# Patient Record
Sex: MALE | Race: WHITE | NOT HISPANIC OR LATINO | Employment: OTHER | URBAN - METROPOLITAN AREA
[De-identification: names, ages, dates, MRNs, and addresses within clinical notes are randomized per-mention and may not be internally consistent; named-entity substitution may affect disease eponyms.]

---

## 2017-01-12 ENCOUNTER — HOSPITAL ENCOUNTER (OUTPATIENT)
Dept: GENERAL RADIOLOGY | Facility: HOSPITAL | Age: 62
Discharge: HOME | End: 2017-01-12
Admitting: INTERNAL MEDICINE

## 2017-01-12 ENCOUNTER — OFFICE VISIT (OUTPATIENT)
Dept: FAMILY MEDICINE CLINIC | Facility: CLINIC | Age: 62
End: 2017-01-12

## 2017-01-12 VITALS
HEIGHT: 67 IN | TEMPERATURE: 98.4 F | RESPIRATION RATE: 16 BRPM | DIASTOLIC BLOOD PRESSURE: 80 MMHG | BODY MASS INDEX: 26.95 KG/M2 | SYSTOLIC BLOOD PRESSURE: 114 MMHG | OXYGEN SATURATION: 97 % | WEIGHT: 171.7 LBS | HEART RATE: 54 BPM

## 2017-01-12 DIAGNOSIS — K21.9 GASTROESOPHAGEAL REFLUX DISEASE, ESOPHAGITIS PRESENCE NOT SPECIFIED: ICD-10-CM

## 2017-01-12 DIAGNOSIS — K59.00 CONSTIPATION, UNSPECIFIED CONSTIPATION TYPE: Primary | ICD-10-CM

## 2017-01-12 PROCEDURE — 99213 OFFICE O/P EST LOW 20 MIN: CPT | Performed by: INTERNAL MEDICINE

## 2017-01-12 PROCEDURE — 74020 HC XR ABDOMEN FLAT & UPRIGHT: CPT

## 2017-01-12 NOTE — MR AVS SNAPSHOT
Chino DEJESUS Mayo   1/12/2017 10:45 AM   Office Visit    Dept Phone:  [phone]   Encounter #:  [id number]    Provider:  Yuri Salcedo MD   Department:  [facility] PRIMARY [facility]ARE                Your Full Care Plan              Today's Medication Changes          These changes are accurate as of: 1/12/17 11:20 AM.  If you have any questions, ask your nurse or doctor.               Medication(s)that have changed:     esomeprazole 40 MG capsule   Commonly known as:  nexIUM   TAKE 1 CAPSULE DAILY.      PLEASE MAKE AN APPOINTMENT WITH YOUR DOCTOR.   What changed:  Another medication with the same name was removed. Continue taking this medication, and follow the directions you see here.   Changed by:  Yuri Salcedo MD                  Your Updated Medication List          This list is accurate as of: 1/12/17 11:20 AM.  Always use your most recent med list.                aspirin 325 MG tablet       atorvastatin 80 MG tablet   Commonly known as:  LIPITOR   Take 1 tablet by mouth Daily.       esomeprazole 40 MG capsule   Commonly known as:  nexIUM   TAKE 1 CAPSULE DAILY.      PLEASE MAKE AN APPOINTMENT WITH YOUR DOCTOR.       lisinopril 2.5 MG tablet   Commonly known as:  PRINIVIL,ZESTRIL   TAKE 1 TABLET DAILY       metoprolol succinate XL 25 MG 24 hr tablet   Commonly known as:  TOPROL-XL   TAKE 1 TABLET DAILY               We Performed the Following     XR Abdomen Flat & Upright       You Were Diagnosed With        Codes Comments    Constipation, unspecified constipation type    -  Primary ICD-10-CM: K59.00  ICD-9-CM: 564.00       Instructions     None    Patient Instructions History      Upcoming Appointments     Visit Type Date Time Department    OFFICE VISIT 1/12/2017 10:45 AM [facility]    FOLLOW UP 2/15/2017 10:20 AM [facility] Signup     [facility] [employer] allows you to send messages to your doctor, view your test results, renew your  "prescriptions, schedule appointments, and more. To sign up, go to [employer] and click on the Sign Up Now link in the New User? box. Enter your [employer] Activation Code exactly as it appears below along with the last four digits of your Social Security Number and your Date of Birth () to complete the sign-up process. If you do not sign up before the expiration date, you must request a new code.    [employer] Activation Code: [email or web address]  Expires: 2017 11:20 AM    If you have questions, you can email [email or web address] or call [phone] to talk to our [employer] staff. Remember, [employer] is NOT to be used for urgent needs. For medical emergencies, dial 911.               Other Info from Your Visit           Your Appointments     Feb 15, 2017 10:20 AM EST   Follow Up with Kisha Berman MD   [facility] CARDIOLOGY (--)    [address] [address] [phone]           Arrive 15 minutes prior to appointment.              Allergies     Other        Reason for Visit     Heartburn gerd      Vital Signs     Blood Pressure Pulse Temperature Respirations Height Weight    114/80 54 98.4 °F (36.9 °C) (Oral) 16 67\" (170.2 cm) 171 lb 11.2 oz (77.9 kg)    Oxygen Saturation Body Mass Index Smoking Status             97% 26.89 kg/m2 Never Smoker         Problems and Diagnoses Noted     Constipation        "

## 2017-01-12 NOTE — PROGRESS NOTES
"Subjective   Patient ID: Chino Huber is a 61 y.o. male presents with   Chief Complaint   Patient presents with   • Heartburn     gerd       HPI - this patient had an episode of what he thinks was reflux around the new year.  He doubled up on his Nexium and now the symptoms have resolved.  However he has some right upper quadrant pain radiating to his back that is bothering him as well.  That has improved since his constipation has resolved.  He is already set up for a colonoscopy.    Assessment plan    GERD and constipation-recommend going back to usual dose of Nexium now that his reflux is better.  If it recurs then we should send him to GI for further evaluation.    Constipation-he's set up for a colonoscopy within the next month.  I'll go ahead and get him x-ray of the abdomen.  For now he declines an ultrasound of the abdomen.    Allergies   Allergen Reactions   • Other        The following portions of the patient's history were reviewed and updated as appropriate: allergies, current medications, past family history, past medical history, past social history, past surgical history and problem list.      Review of Systems   Constitutional: Negative for fatigue and fever.   Cardiovascular: Negative.    Gastrointestinal: Positive for abdominal distention and constipation. Negative for nausea and vomiting.        Reflux symptoms up around the throat, now resolved.  No exertional symptoms.    Right upper quadrant abdominal pain radiating to the back which is now improved.   Genitourinary: Negative.    Musculoskeletal: Positive for back pain.       Objective     Vitals:    01/12/17 1045   BP: 114/80   Pulse: 54   Resp: 16   Temp: 98.4 °F (36.9 °C)   TempSrc: Oral   SpO2: 97%   Weight: 171 lb 11.2 oz (77.9 kg)   Height: 67\" (170.2 cm)         Physical Exam   Constitutional: He is oriented to person, place, and time. He appears well-developed and well-nourished.   Cardiovascular: Normal rate, regular rhythm and normal " heart sounds.    Pulmonary/Chest: Effort normal and breath sounds normal.   Abdominal: Soft. He exhibits no distension and no mass. There is no tenderness. No hernia.   Neurological: He is alert and oriented to person, place, and time.   Psychiatric: He has a normal mood and affect. His behavior is normal.   Nursing note and vitals reviewed.        Chino was seen today for heartburn.    Diagnoses and all orders for this visit:    Constipation, unspecified constipation type  -     XR Abdomen Flat & Upright    Gastroesophageal reflux disease, esophagitis presence not specified        Call or return to clinic prn if these symptoms worsen or fail to improve as anticipated.

## 2017-02-03 ENCOUNTER — ANESTHESIA (OUTPATIENT)
Dept: GASTROENTEROLOGY | Facility: HOSPITAL | Age: 62
End: 2017-02-03

## 2017-02-03 ENCOUNTER — ANESTHESIA EVENT (OUTPATIENT)
Dept: GASTROENTEROLOGY | Facility: HOSPITAL | Age: 62
End: 2017-02-03

## 2017-02-03 PROCEDURE — 25010000002 PROPOFOL 10 MG/ML EMULSION: Performed by: ANESTHESIOLOGY

## 2017-02-03 RX ORDER — PROPOFOL 10 MG/ML
VIAL (ML) INTRAVENOUS AS NEEDED
Status: DISCONTINUED | OUTPATIENT
Start: 2017-02-03 | End: 2017-02-03 | Stop reason: SURG

## 2017-02-03 RX ORDER — LIDOCAINE HYDROCHLORIDE 20 MG/ML
INJECTION, SOLUTION INFILTRATION; PERINEURAL AS NEEDED
Status: DISCONTINUED | OUTPATIENT
Start: 2017-02-03 | End: 2017-02-03 | Stop reason: SURG

## 2017-02-03 RX ADMIN — SODIUM CHLORIDE, POTASSIUM CHLORIDE, SODIUM LACTATE AND CALCIUM CHLORIDE: 600; 310; 30; 20 INJECTION, SOLUTION INTRAVENOUS at 14:35

## 2017-02-03 RX ADMIN — LIDOCAINE HYDROCHLORIDE 160 MG: 20 INJECTION, SOLUTION INFILTRATION; PERINEURAL at 14:45

## 2017-02-03 RX ADMIN — PROPOFOL 360 MG: 10 INJECTION, EMULSION INTRAVENOUS at 14:45

## 2017-02-03 NOTE — ANESTHESIA PREPROCEDURE EVALUATION
Anesthesia Evaluation     Patient summary reviewed and Nursing notes reviewed    Airway   Mallampati: II  TM distance: >3 FB  Neck ROM: full  no difficulty expected  Dental - normal exam     Pulmonary - negative pulmonary ROS and normal exam   Cardiovascular - normal exam  (+) hypertension, valvular problems/murmurs AS, CAD, PVD    Neuro/Psych- negative ROS  GI/Hepatic/Renal/Endo    (+)  GERD,     Musculoskeletal (-) negative ROS    Abdominal  - normal exam   Substance History - negative use     OB/GYN          Other - negative ROS                            Anesthesia Plan    ASA 3     MAC     intravenous induction   Anesthetic plan and risks discussed with patient.

## 2017-02-03 NOTE — ANESTHESIA POSTPROCEDURE EVALUATION
Patient: Chino Huber    Procedure Summary     Date Anesthesia Start Anesthesia Stop Room / Location    02/03/17 1439 1528  [facility] /  [facility]       Procedure Diagnosis Surgeon Provider    COLONOSCOPY TO CECUM WITH HOT SNARE POLYPECTOMY WITH NORMAL SALINE INJECTION AND  TWO RESOLUTION CLIPS (N/A ) Encounter for screening for malignant neoplasm of colon; Family history of GI malignancy  (Encounter for screening for malignant neoplasm of colon [Z12.11]; Family history of GI malignancy [Z80.0]) MD Michael Mckenzie MD          Anesthesia Type: MAC  Last vitals  BP      Temp      Pulse     Resp      SpO2        Post Anesthesia Care and Evaluation    Patient location during evaluation: PACU  Patient participation: complete - patient participated  Level of consciousness: awake and alert  Pain management: adequate  Airway patency: patent  Anesthetic complications: No anesthetic complications    Cardiovascular status: acceptable  Respiratory status: acceptable  Hydration status: acceptable

## 2017-02-08 ENCOUNTER — TELEPHONE (OUTPATIENT)
Dept: GASTROENTEROLOGY | Facility: CLINIC | Age: 62
End: 2017-02-08

## 2017-02-08 NOTE — TELEPHONE ENCOUNTER
Called pt and advised per Dr Rose that the colon polyps that were removed not cancerous but were precancerous and he recommends a repeat c/s in 3 yrs and call sooner if needed.  Pt verb understanding and wanted to make a f/u appt for issues he is having with gerd.  Pt made f/u for 02/21 at 11am with Dr Rose.        Message sent to Filomena to place c/s in recall for 02/04/2020.

## 2017-02-08 NOTE — TELEPHONE ENCOUNTER
----- Message from Fam LILLY MD sent at 2/6/2017  5:44 PM EST -----  Regarding: Biopsy results  Okay to call results, recommend follow-up colonoscopy in 3 years, patient can follow up sooner as needed  ----- Message -----     From: Lab, Background User     Sent: 2/6/2017   1:21 PM       To: Fam LILLY MD

## 2017-02-15 ENCOUNTER — OFFICE VISIT (OUTPATIENT)
Dept: GASTROENTEROLOGY | Facility: CLINIC | Age: 62
End: 2017-02-15

## 2017-02-15 VITALS
HEIGHT: 67 IN | BODY MASS INDEX: 26.78 KG/M2 | WEIGHT: 170.6 LBS | SYSTOLIC BLOOD PRESSURE: 132 MMHG | DIASTOLIC BLOOD PRESSURE: 78 MMHG

## 2017-02-15 DIAGNOSIS — R10.31 RIGHT LOWER QUADRANT ABDOMINAL PAIN: Primary | ICD-10-CM

## 2017-02-15 DIAGNOSIS — R12 HEARTBURN: ICD-10-CM

## 2017-02-15 DIAGNOSIS — K21.9 GASTROESOPHAGEAL REFLUX DISEASE, ESOPHAGITIS PRESENCE NOT SPECIFIED: ICD-10-CM

## 2017-02-15 LAB
ALBUMIN SERPL-MCNC: 4.4 G/DL (ref 3.5–5.2)
ALBUMIN/GLOB SERPL: 2 G/DL
ALP SERPL-CCNC: 133 U/L (ref 39–117)
ALT SERPL-CCNC: 55 U/L (ref 1–41)
AST SERPL-CCNC: 25 U/L (ref 1–40)
BASOPHILS # BLD AUTO: 0.03 10*3/MM3 (ref 0–0.2)
BASOPHILS NFR BLD AUTO: 0.4 % (ref 0–1.5)
BILIRUB SERPL-MCNC: 0.3 MG/DL (ref 0.1–1.2)
BUN SERPL-MCNC: 13 MG/DL (ref 8–23)
BUN/CREAT SERPL: 12 (ref 7–25)
CALCIUM SERPL-MCNC: 10 MG/DL (ref 8.6–10.5)
CHLORIDE SERPL-SCNC: 102 MMOL/L (ref 98–107)
CO2 SERPL-SCNC: 27.1 MMOL/L (ref 22–29)
CREAT SERPL-MCNC: 1.08 MG/DL (ref 0.76–1.27)
EOSINOPHIL # BLD AUTO: 0.11 10*3/MM3 (ref 0–0.7)
EOSINOPHIL NFR BLD AUTO: 1.5 % (ref 0.3–6.2)
ERYTHROCYTE [DISTWIDTH] IN BLOOD BY AUTOMATED COUNT: 13.1 % (ref 11.5–14.5)
GLOBULIN SER CALC-MCNC: 2.2 GM/DL
GLUCOSE SERPL-MCNC: 102 MG/DL (ref 65–99)
HCT VFR BLD AUTO: 49.1 % (ref 40.4–52.2)
HGB BLD-MCNC: 16.2 G/DL (ref 13.7–17.6)
IMM GRANULOCYTES # BLD: 0.02 10*3/MM3 (ref 0–0.03)
IMM GRANULOCYTES NFR BLD: 0.3 % (ref 0–0.5)
LYMPHOCYTES # BLD AUTO: 2.31 10*3/MM3 (ref 0.9–4.8)
LYMPHOCYTES NFR BLD AUTO: 31.3 % (ref 19.6–45.3)
MCH RBC QN AUTO: 29.9 PG (ref 27–32.7)
MCHC RBC AUTO-ENTMCNC: 33 G/DL (ref 32.6–36.4)
MCV RBC AUTO: 90.6 FL (ref 79.8–96.2)
MONOCYTES # BLD AUTO: 0.63 10*3/MM3 (ref 0.2–1.2)
MONOCYTES NFR BLD AUTO: 8.5 % (ref 5–12)
NEUTROPHILS # BLD AUTO: 4.29 10*3/MM3 (ref 1.9–8.1)
NEUTROPHILS NFR BLD AUTO: 58 % (ref 42.7–76)
PLATELET # BLD AUTO: 248 10*3/MM3 (ref 140–500)
POTASSIUM SERPL-SCNC: 5.1 MMOL/L (ref 3.5–5.2)
PROT SERPL-MCNC: 6.6 G/DL (ref 6–8.5)
RBC # BLD AUTO: 5.42 10*6/MM3 (ref 4.6–6)
SODIUM SERPL-SCNC: 143 MMOL/L (ref 136–145)
WBC # BLD AUTO: 7.39 10*3/MM3 (ref 4.5–10.7)

## 2017-02-15 PROCEDURE — 99214 OFFICE O/P EST MOD 30 MIN: CPT | Performed by: NURSE PRACTITIONER

## 2017-02-15 RX ORDER — SODIUM CHLORIDE 0.9 % (FLUSH) 0.9 %
1-10 SYRINGE (ML) INJECTION AS NEEDED
Status: CANCELLED | OUTPATIENT
Start: 2017-02-15

## 2017-02-15 NOTE — PROGRESS NOTES
"Chief Complaint   Patient presents with   • Abdominal Pain   • Heartburn     Subjective     HPI  Chino Huber is a 61 y.o. male who presents for a follow-up regarding GERD and acute right lower quadrant abdominal pain.  The patient has never had an appointment in the office but was recently colonoscopy with Dr. Rose on February 3, 2017 for routine surveillance screening.  He will need a repeat colonoscopy in 3 years due to findings of diverticulosis and 3 TA polyps.  He has a family history of colon cancer in a first-degree relative.    GERD-he is prescribed Nexium 40 mg twice a day for his heartburn previously treated by his PCP Dr. Salcedo.  He has had a recent severe \"attack\" of heartburn that is improved since starting the twice a day dosing.  He has never had upper endoscopic evaluation.  He denies dysphagia, odynophagia, hematemesis, early satiety, anorexia, weight loss, melena, bright red blood per rectum.  He denies NSAID use, alcohol use, or tobacco use. Pt states he follows GERD diet guidelines    Abdominal pain- for the past 1.5 months he has complained of right lower quadrant abdominal pain radiates to his flank.  He was seen by Dr. Salcedo in January who ordered a abdominal x-ray which was unremarkable.  A colonoscopy with Dr. Rose was then arranged.  He continues to have a mild dull aching pain with no specific triggering or alleviating factors. He does state that it is better when he is up and moving around and worse in certain positions like bending over or sitting.  Cannot recall a specific injury that could've caused the pain  He denies urinary symptoms, fevers, chills, change in bowel habits.    Past Medical History   Diagnosis Date   • Acute sinusitis    • Aortic root dilatation    • Aortic stenosis    • Arteriosclerotic coronary artery disease    • CAD (coronary artery disease)    • Chest pain    • Cough    • Dilated aortic root    • Folliculitis    • GERD (gastroesophageal reflux " disease)    • H/O echocardiogram 04/29/2014   • H/O exercise stress test 01/23/2014     Treadmill   • Health care maintenance    • Heartburn    • History of EKG 10/28/2015   • Hyperlipidemia    • Hypertension    • Nonspecific abnormal findings on radiological and examination of intrathoracic organs    • Reflux esophagitis    • Sore throat    • Throat pain      Past Surgical History   Procedure Laterality Date   • Colonoscopy     • Rhinoplasty     • Prostate surgery     • Coronary angioplasty with stent placement     • Colonoscopy     • Prostate surgery     • Rhinoplasty     • Colonoscopy N/A 2/3/2017     Procedure: COLONOSCOPY TO CECUM WITH HOT SNARE POLYPECTOMY WITH NORMAL SALINE INJECTION AND  TWO RESOLUTION CLIPS;  Surgeon: Fam LILLY MD;  Location: [facility] ENDOSCOPY;  Service:          Social History     Social History   • Marital status:      Spouse name: N/A   • Number of children: N/A   • Years of education: N/A     Social History Main Topics   • Smoking status: Never Smoker   • Smokeless tobacco: Never Used      Comment: caffeine use   • Alcohol use No   • Drug use: No   • Sexual activity: Defer     Other Topics Concern   • None     Social History Narrative         Current Outpatient Prescriptions:   •  aspirin 325 MG tablet, Take 325 mg by mouth daily., Disp: , Rfl:   •  atorvastatin (LIPITOR) 80 MG tablet, Take 1 tablet by mouth Daily., Disp: 90 tablet, Rfl: 3  •  esomeprazole (NexIUM) 40 MG capsule, TAKE 1 CAPSULE DAILY.      PLEASE MAKE AN APPOINTMENT WITH YOUR DOCTOR., Disp: 90 capsule, Rfl: 0  •  lisinopril (PRINIVIL,ZESTRIL) 2.5 MG tablet, TAKE 1 TABLET DAILY, Disp: 90 tablet, Rfl: 1  •  metoprolol succinate XL (TOPROL-XL) 25 MG 24 hr tablet, TAKE 1 TABLET DAILY, Disp: 90 tablet, Rfl: 1    Review of Systems   Constitutional: Negative for appetite change and fatigue.   HENT: Negative for trouble swallowing.    Respiratory: Negative for choking.    Cardiovascular: Negative for chest  pain.   Gastrointestinal: Positive for abdominal pain. Negative for abdominal distention, blood in stool, constipation, diarrhea, nausea and vomiting.        Heartburn   Musculoskeletal: Negative for back pain.   Skin: Negative for pallor.   Neurological: Negative for dizziness.       Objective   Vitals:    02/15/17 0849   BP: 132/78       Physical Exam   Constitutional: He is oriented to person, place, and time. He appears well-developed and well-nourished.   HENT:   Head: Normocephalic and atraumatic.   Eyes: Conjunctivae and EOM are normal.   Neck: Normal range of motion. No tracheal deviation present.   Cardiovascular: Normal rate and regular rhythm.    Pulmonary/Chest: Effort normal and breath sounds normal. No respiratory distress.   Abdominal: Soft. Normal appearance and bowel sounds are normal. He exhibits no distension and no mass. There is tenderness in the right lower quadrant. There is no rigidity, no rebound, no guarding, no tenderness at McBurney's point and negative Quintana's sign.   No pain with palpation of flanks.    Musculoskeletal: Normal range of motion.   Neurological: He is alert and oriented to person, place, and time.   Skin: Skin is warm and dry.   Psychiatric: He has a normal mood and affect. Judgment normal.   Nursing note and vitals reviewed.      Assessment/Plan   Chino was seen today for abdominal pain and heartburn.    Diagnoses and all orders for this visit:    Right lower quadrant abdominal pain  Comments:  CT abdomen and pelvis  Orders:  -     CBC & Differential  -     Comprehensive Metabolic Panel  -     CT Abdomen Pelvis With & Without Contrast; Future    Gastroesophageal reflux disease, esophagitis presence not specified  Comments:  Continue PPi BID. Proceed with EGD for recurrent heartburn  Orders:  -     CBC & Differential  -     Case Request; Standing  -     sodium chloride 0.9 % flush 1-10 mL; Infuse 1-10 mL into a venous catheter As Needed for line care.  -     Case  Request    Heartburn  Comments:  nexium 40mg BID  Orders:  -     Case Request; Standing  -     sodium chloride 0.9 % flush 1-10 mL; Infuse 1-10 mL into a venous catheter As Needed for line care.  -     Case Request    Other orders  -     Implement Anesthesia orders day of procedure.; Standing  -     Obtain informed consent; Standing  -     Insert Peripheral IV; Standing  -     Saline Lock & Maintain IV Access; Standing    Follow up in 3 months. EGD with Dr Rose  For any additional questions, concerns or changes to your condition after today's office visit please contact the office at [phone].

## 2017-02-16 ENCOUNTER — TELEPHONE (OUTPATIENT)
Dept: GASTROENTEROLOGY | Facility: CLINIC | Age: 62
End: 2017-02-16

## 2017-02-16 DIAGNOSIS — R79.89 ELEVATED LIVER FUNCTION TESTS: ICD-10-CM

## 2017-02-16 DIAGNOSIS — R10.31 RIGHT LOWER QUADRANT ABDOMINAL PAIN: Primary | ICD-10-CM

## 2017-02-16 NOTE — TELEPHONE ENCOUNTER
Patient's alk phos and ALT mildly elevated with right sided abdominal pain and GERD symptoms. Check RUQ ultrasound. We will proceed with CT a/p if that is normal

## 2017-02-20 ENCOUNTER — HOSPITAL ENCOUNTER (OUTPATIENT)
Dept: ULTRASOUND IMAGING | Facility: HOSPITAL | Age: 62
Discharge: HOME | End: 2017-02-20
Admitting: NURSE PRACTITIONER

## 2017-02-20 DIAGNOSIS — R10.31 RIGHT LOWER QUADRANT ABDOMINAL PAIN: ICD-10-CM

## 2017-02-20 DIAGNOSIS — R79.89 ELEVATED LIVER FUNCTION TESTS: ICD-10-CM

## 2017-02-20 PROCEDURE — 76700 US EXAM ABDOM COMPLETE: CPT

## 2017-02-21 ENCOUNTER — TELEPHONE (OUTPATIENT)
Dept: GASTROENTEROLOGY | Facility: CLINIC | Age: 62
End: 2017-02-21

## 2017-02-21 NOTE — TELEPHONE ENCOUNTER
Pt abdominal pain improved but still mild. RUQ US unremarkable. If pain returns consider CT a/p but he would like to wait on further imaging right now. He has endoscopy scheduled with Dr Rose next month

## 2017-02-22 ENCOUNTER — OFFICE VISIT (OUTPATIENT)
Dept: CARDIOLOGY | Facility: CLINIC | Age: 62
End: 2017-02-22

## 2017-02-22 ENCOUNTER — APPOINTMENT (OUTPATIENT)
Dept: CT IMAGING | Facility: HOSPITAL | Age: 62
End: 2017-02-22

## 2017-02-22 VITALS
HEART RATE: 54 BPM | SYSTOLIC BLOOD PRESSURE: 128 MMHG | BODY MASS INDEX: 26.53 KG/M2 | WEIGHT: 169 LBS | HEIGHT: 67 IN | DIASTOLIC BLOOD PRESSURE: 76 MMHG

## 2017-02-22 DIAGNOSIS — I77.810 AORTIC ROOT DILATATION (HCC): ICD-10-CM

## 2017-02-22 DIAGNOSIS — Q23.1 BICUSPID AORTIC VALVE: ICD-10-CM

## 2017-02-22 DIAGNOSIS — I25.10 CHRONIC CORONARY ARTERY DISEASE: ICD-10-CM

## 2017-02-22 DIAGNOSIS — I35.0 NONRHEUMATIC AORTIC VALVE STENOSIS: ICD-10-CM

## 2017-02-22 DIAGNOSIS — I10 HYPERTENSION, ESSENTIAL: Primary | ICD-10-CM

## 2017-02-22 PROCEDURE — 99213 OFFICE O/P EST LOW 20 MIN: CPT | Performed by: INTERNAL MEDICINE

## 2017-02-22 PROCEDURE — 93000 ELECTROCARDIOGRAM COMPLETE: CPT | Performed by: INTERNAL MEDICINE

## 2017-02-22 NOTE — PROGRESS NOTES
Date of Office Visit: 2017  Encounter Provider: Kisha Berman MD  Place of Service: [facility] CARDIOLOGY  Patient Name: Chino Huber  :1955    Chief complaint  Follow-up of coronary artery disease, bicuspid aortic valve and dilated aortic root    History of Present Illness  The patient is a pleasant, 61year-old gentleman with history of prostate cancer status   post resection, hypertension, and hyperlipidemia who in 2014 was noted to have coronary   artery disease when he presented with chest pain and abnormal stress test. A cardiac   catheterization revealed 90% stenosis of the circumflex artery and of the right coronary   artery. The left anterior descending and left main were normal. He underwent drug-coated   stent placement to both the circumflex and the right coronary artery.  He had a CT antrum of his chest in May 2015 that revealed stable thoracic aneurysm measuring 4.0 cm. He was seen in 2016 with recurrent chest pain.  Echocardiogram revealed an ejection fraction 67% with bicuspid aortic valve with mild aortic valve stenosis.  No significant regurgitation the aortic root was borderline dilated.  Subsequently had a stress perfusion study that was negative for ischemia he now presents a follow-up.    Since last visit he has been walking for 1-1/2 miles in 30 minutes every day.  He denies any chest pain, shortness of breath, palpitations, syncope.  He had a bad episode of reflux 2 weeks ago.  He is considering an EGD in the next several weeks.  He had a gallbladder ultrasound today after recent elevated liver function tests were noted.  This was performed after he had some right-sided mid back pain.    Past Medical History   Diagnosis Date   • Acute sinusitis    • Aortic root dilatation    • Aortic stenosis    • Arteriosclerotic coronary artery disease    • CAD (coronary artery disease)    • Chest pain    • Cough    • Dilated aortic root    • Folliculitis     • GERD (gastroesophageal reflux disease)    • Heartburn    • Hyperlipidemia    • Hypertension    • Nonspecific abnormal findings on radiological and examination of intrathoracic organs    • Reflux esophagitis    • Sore throat    • Throat pain      Past Surgical History   Procedure Laterality Date   • Colonoscopy     • Rhinoplasty     • Prostate surgery     • Coronary angioplasty with stent placement     • Colonoscopy     • Prostate surgery     • Rhinoplasty     • Colonoscopy N/A 2/3/2017     Procedure: COLONOSCOPY TO CECUM WITH HOT SNARE POLYPECTOMY WITH NORMAL SALINE INJECTION AND  TWO RESOLUTION CLIPS;  Surgeon: Fam LILLY MD;  Location: [facility] ENDOSCOPY;  Service:      Outpatient Medications Prior to Visit   Medication Sig Dispense Refill   • aspirin 325 MG tablet Take 325 mg by mouth daily.     • atorvastatin (LIPITOR) 80 MG tablet Take 1 tablet by mouth Daily. 90 tablet 3   • esomeprazole (NexIUM) 40 MG capsule TAKE 1 CAPSULE DAILY.      PLEASE MAKE AN APPOINTMENT WITH YOUR DOCTOR. 90 capsule 0   • lisinopril (PRINIVIL,ZESTRIL) 2.5 MG tablet TAKE 1 TABLET DAILY 90 tablet 1   • metoprolol succinate XL (TOPROL-XL) 25 MG 24 hr tablet TAKE 1 TABLET DAILY 90 tablet 1     No facility-administered medications prior to visit.      Allergies as of 02/22/2017 - Jm as Reviewed 02/22/2017   Allergen Reaction Noted   • Other  06/30/2016     Social History     Social History   • Marital status:      Spouse name: N/A   • Number of children: N/A   • Years of education: N/A     Occupational History   • Not on file.     Social History Main Topics   • Smoking status: Never Smoker   • Smokeless tobacco: Never Used      Comment: caffeine use   • Alcohol use No   • Drug use: No   • Sexual activity: Defer     Other Topics Concern   • Not on file     Social History Narrative     Family History   Problem Relation Age of Onset   • Heart disease Mother    • Hypertension Mother    • Heart disease Father    •  "Hypertension Father    • Other Father      CABG, heart valve replacement   • Coronary artery disease Father      Review of Systems   Constitution: Negative for fever, malaise/fatigue, weight gain and weight loss.   HENT: Negative for ear pain, hearing loss, nosebleeds and sore throat.    Eyes: Negative for double vision, pain, vision loss in left eye and vision loss in right eye.   Cardiovascular:        See history of present illness.   Respiratory: Negative for cough, shortness of breath, sleep disturbances due to breathing, snoring and wheezing.    Endocrine: Negative for cold intolerance, heat intolerance and polyuria.   Skin: Negative for itching, poor wound healing and rash.   Musculoskeletal: Negative for joint pain, joint swelling and myalgias.   Gastrointestinal: Positive for abdominal pain. Negative for diarrhea, hematochezia, nausea and vomiting.   Genitourinary: Negative for hematuria and hesitancy.   Neurological: Negative for numbness, paresthesias and seizures.   Psychiatric/Behavioral: Negative for depression. The patient is not nervous/anxious.      Objective:     Vitals:    02/22/17 1322   BP: 128/76   Pulse: 54   Weight: 169 lb (76.7 kg)   Height: 67\" (170.2 cm)     Body mass index is 26.47 kg/(m^2).    Physical Exam   Constitutional: He is oriented to person, place, and time. He appears well-developed and well-nourished.   HENT:   Head: Normocephalic.   Nose: Nose normal.   Mouth/Throat: Oropharynx is clear and moist.   Eyes: Conjunctivae and EOM are normal. Pupils are equal, round, and reactive to light. Right eye exhibits no discharge. No scleral icterus.   Neck: Normal range of motion. Neck supple. No JVD present. No thyromegaly present.   Cardiovascular: Normal rate, regular rhythm and intact distal pulses.  Exam reveals no gallop and no friction rub.    Murmur heard.   Harsh midsystolic murmur is present with a grade of 2/6  at the upper right sternal border radiating to the neck  Pulses:    "    Carotid pulses are 2+ on the right side, and 2+ on the left side.       Radial pulses are 2+ on the right side, and 2+ on the left side.        Femoral pulses are 2+ on the right side, and 2+ on the left side.       Popliteal pulses are 2+ on the right side, and 2+ on the left side.        Dorsalis pedis pulses are 2+ on the right side, and 2+ on the left side.        Posterior tibial pulses are 2+ on the right side, and 2+ on the left side.   Pulmonary/Chest: Effort normal and breath sounds normal. No respiratory distress. He has no wheezes. He has no rales.   Abdominal: Soft. Bowel sounds are normal. He exhibits no distension. There is no hepatosplenomegaly. There is no tenderness. There is no rebound.   Musculoskeletal: Normal range of motion. He exhibits no edema or tenderness.   Neurological: He is alert and oriented to person, place, and time.   Skin: Skin is warm and dry. No rash noted. No erythema.   Psychiatric: He has a normal mood and affect. His behavior is normal. Judgment and thought content normal.   Vitals reviewed.    Lab Review:     ECG 12 Lead  Date/Time: 2/22/2017 7:06 PM  Performed by: PARVEEN WILKINS  Authorized by: PARVEEN WILKINS   Comparison: compared with previous ECG   Similar to previous ECG  Rhythm: sinus bradycardia  Conduction comments: QTc = 395msec  Clinical impression: normal ECG          Assessment:       Diagnosis Plan   1. Hypertension, essential     2. Chronic coronary artery disease     3. Bicuspid aortic valve     4. Nonrheumatic aortic valve stenosis     5. Aortic root dilatation       Plan:       1.  Coronary artery disease with history of prior stenting and negative stress test in June 2016.  Has no anginal symptoms will continue lifestyle modification and follow-up in 6 months.  2.  Borderline dilated aorta, we'll check a echocardiogram in 6 months  3.  Mild aortic valve stenosis reassess by echocardiography in 6 months  4.  Elevated liver function tests and abdominal pain.   Dr. Salcedo is evaluating this.  5.  GE reflux disease, for EGD in the next several weeks    Coronary Artery Disease  Assessment  • The patient has no angina    Plan  • Lifestyle modifications discussed include adhering to a heart healthy diet, maintenance of a healthy weight, medication compliance, regular exercise and regular monitoring of cholesterol and blood pressure    Subjective - Objective  • There has been a previous stent procedure using JONAS  • Current antiplatelet therapy includes aspirin 325 mg         Chino Huber   Home Medication Instructions CLAIRE:    Printed on:02/26/17 1912   Medication Information                      aspirin 325 MG tablet  Take 325 mg by mouth daily.             atorvastatin (LIPITOR) 80 MG tablet  Take 1 tablet by mouth Daily.             esomeprazole (NexIUM) 40 MG capsule  TAKE 1 CAPSULE DAILY.      PLEASE MAKE AN APPOINTMENT WITH YOUR DOCTOR.             lisinopril (PRINIVIL,ZESTRIL) 2.5 MG tablet  TAKE 1 TABLET DAILY             metoprolol succinate XL (TOPROL-XL) 25 MG 24 hr tablet  TAKE 1 TABLET DAILY                 EMR Dragon/Transcription disclaimer:   Much of this encounter note is an electronic transcription/translation of spoken language to printed text. The electronic translation of spoken language may permit erroneous, or at times, nonsensical words or phrases to be inadvertently transcribed; Although I have reviewed the note for such errors, some may still exist.

## 2017-03-13 DIAGNOSIS — Z80.0 FAMILY HISTORY OF MALIGNANT NEOPLASM OF GASTROINTESTINAL TRACT: Primary | ICD-10-CM

## 2017-03-13 DIAGNOSIS — K59.00 CONSTIPATION, UNSPECIFIED CONSTIPATION TYPE: ICD-10-CM

## 2017-03-15 ENCOUNTER — ANESTHESIA EVENT (OUTPATIENT)
Dept: GASTROENTEROLOGY | Facility: HOSPITAL | Age: 62
End: 2017-03-15

## 2017-03-15 ENCOUNTER — HOSPITAL ENCOUNTER (OUTPATIENT)
Facility: HOSPITAL | Age: 62
Setting detail: HOSPITAL OUTPATIENT SURGERY
Discharge: HOME | End: 2017-03-15
Attending: INTERNAL MEDICINE | Admitting: INTERNAL MEDICINE

## 2017-03-15 ENCOUNTER — ANESTHESIA (OUTPATIENT)
Dept: GASTROENTEROLOGY | Facility: HOSPITAL | Age: 62
End: 2017-03-15

## 2017-03-15 VITALS
SYSTOLIC BLOOD PRESSURE: 106 MMHG | RESPIRATION RATE: 15 BRPM | TEMPERATURE: 98.6 F | HEART RATE: 54 BPM | BODY MASS INDEX: 26.03 KG/M2 | WEIGHT: 166.19 LBS | OXYGEN SATURATION: 99 % | DIASTOLIC BLOOD PRESSURE: 78 MMHG

## 2017-03-15 DIAGNOSIS — R12 HEARTBURN: ICD-10-CM

## 2017-03-15 DIAGNOSIS — K21.9 GASTROESOPHAGEAL REFLUX DISEASE, ESOPHAGITIS PRESENCE NOT SPECIFIED: ICD-10-CM

## 2017-03-15 PROCEDURE — 87081 CULTURE SCREEN ONLY: CPT | Performed by: INTERNAL MEDICINE

## 2017-03-15 PROCEDURE — 25010000002 PROPOFOL 10 MG/ML EMULSION: Performed by: ANESTHESIOLOGY

## 2017-03-15 PROCEDURE — 43239 EGD BIOPSY SINGLE/MULTIPLE: CPT | Performed by: INTERNAL MEDICINE

## 2017-03-15 PROCEDURE — 88305 TISSUE EXAM BY PATHOLOGIST: CPT | Performed by: INTERNAL MEDICINE

## 2017-03-15 PROCEDURE — 88312 SPECIAL STAINS GROUP 1: CPT | Performed by: INTERNAL MEDICINE

## 2017-03-15 RX ORDER — PROMETHAZINE HYDROCHLORIDE 25 MG/1
25 SUPPOSITORY RECTAL ONCE AS NEEDED
Status: DISCONTINUED | OUTPATIENT
Start: 2017-03-15 | End: 2017-03-15 | Stop reason: HOSPADM

## 2017-03-15 RX ORDER — LIDOCAINE HYDROCHLORIDE 20 MG/ML
INJECTION, SOLUTION INFILTRATION; PERINEURAL AS NEEDED
Status: DISCONTINUED | OUTPATIENT
Start: 2017-03-15 | End: 2017-03-15 | Stop reason: SURG

## 2017-03-15 RX ORDER — PROMETHAZINE HYDROCHLORIDE 25 MG/1
25 TABLET ORAL ONCE AS NEEDED
Status: DISCONTINUED | OUTPATIENT
Start: 2017-03-15 | End: 2017-03-15 | Stop reason: HOSPADM

## 2017-03-15 RX ORDER — PROPOFOL 10 MG/ML
VIAL (ML) INTRAVENOUS CONTINUOUS PRN
Status: DISCONTINUED | OUTPATIENT
Start: 2017-03-15 | End: 2017-03-15 | Stop reason: SURG

## 2017-03-15 RX ORDER — SODIUM CHLORIDE, SODIUM LACTATE, POTASSIUM CHLORIDE, CALCIUM CHLORIDE 600; 310; 30; 20 MG/100ML; MG/100ML; MG/100ML; MG/100ML
1000 INJECTION, SOLUTION INTRAVENOUS CONTINUOUS PRN
Status: DISCONTINUED | OUTPATIENT
Start: 2017-03-15 | End: 2017-03-15 | Stop reason: HOSPADM

## 2017-03-15 RX ORDER — PROMETHAZINE HYDROCHLORIDE 25 MG/ML
6.25 INJECTION, SOLUTION INTRAMUSCULAR; INTRAVENOUS ONCE AS NEEDED
Status: DISCONTINUED | OUTPATIENT
Start: 2017-03-15 | End: 2017-03-15 | Stop reason: HOSPADM

## 2017-03-15 RX ORDER — PROPOFOL 10 MG/ML
VIAL (ML) INTRAVENOUS AS NEEDED
Status: DISCONTINUED | OUTPATIENT
Start: 2017-03-15 | End: 2017-03-15 | Stop reason: SURG

## 2017-03-15 RX ORDER — ONDANSETRON 2 MG/ML
4 INJECTION INTRAMUSCULAR; INTRAVENOUS ONCE AS NEEDED
Status: DISCONTINUED | OUTPATIENT
Start: 2017-03-15 | End: 2017-03-15 | Stop reason: HOSPADM

## 2017-03-15 RX ADMIN — PROPOFOL 120 MCG/KG/MIN: 10 INJECTION, EMULSION INTRAVENOUS at 14:21

## 2017-03-15 RX ADMIN — PROPOFOL 110 MG: 10 INJECTION, EMULSION INTRAVENOUS at 14:21

## 2017-03-15 RX ADMIN — SODIUM CHLORIDE, POTASSIUM CHLORIDE, SODIUM LACTATE AND CALCIUM CHLORIDE: 600; 310; 30; 20 INJECTION, SOLUTION INTRAVENOUS at 14:21

## 2017-03-15 RX ADMIN — LIDOCAINE HYDROCHLORIDE 60 MG: 20 INJECTION, SOLUTION INFILTRATION; PERINEURAL at 14:21

## 2017-03-15 RX ADMIN — SODIUM CHLORIDE, POTASSIUM CHLORIDE, SODIUM LACTATE AND CALCIUM CHLORIDE 1000 ML: 600; 310; 30; 20 INJECTION, SOLUTION INTRAVENOUS at 13:59

## 2017-03-15 NOTE — DISCHARGE INSTRUCTIONS
For the next 24 hours patient needs to be with a responsible adult.    For 24 hours DO NOT drive, operate machinery, appliances, drink alcohol, make important decisions or sign legal documents.    Start with a light or bland diet and advance to regular diet as tolerated.    Follow recommendations on procedure report provided by your doctor.    Call Dr. Rose for problems [phone]    Problems may include but not limited to: large amounts of bleeding, trouble breathing, repeated vomiting, severe unrelieved pain, fever or chills.  See Provation report.

## 2017-03-15 NOTE — ANESTHESIA PREPROCEDURE EVALUATION
Anesthesia Evaluation     Patient summary reviewed   no history of anesthetic complications:  NPO Status: > 6 hours   Airway   Mallampati: II  TM distance: >3 FB  Neck ROM: full  no difficulty expected  Dental - normal exam     Pulmonary - normal exam   Cardiovascular - normal exam    (+) hypertension, valvular problems/murmurs AS, cardiac stents more than 12 months ago       Neuro/Psych  GI/Hepatic/Renal/Endo    (+)  GERD,     Musculoskeletal     Abdominal    Substance History      OB/GYN          Other   (+) blood dyscrasia     ROS/Med Hx Other: ASA 325mg q day                              Anesthesia Plan    ASA 3     MAC     Anesthetic plan and risks discussed with patient.

## 2017-03-15 NOTE — H&P
[facility]  Pre Procedure History & Physical    Chief Complaint:   GERD, abdominal pain    Subjective     HPI:   This 61-year-old male presents to the endoscopy suite for upper endoscopic evaluation.  He had undergone colonoscopic examination in February of this year.  He has been treating reflux with Nexium twice daily but still has breakthrough symptoms.    Past Medical History:   Past Medical History   Diagnosis Date   • Acute sinusitis    • Aortic root dilatation    • Aortic stenosis    • Arteriosclerotic coronary artery disease    • CAD (coronary artery disease)    • Chest pain    • Cough    • Dilated aortic root    • Folliculitis    • GERD (gastroesophageal reflux disease)    • Heartburn    • Hyperlipidemia    • Hypertension    • Nonspecific abnormal findings on radiological and examination of intrathoracic organs    • Reflux esophagitis    • Sore throat    • Throat pain        Family History:  Family History   Problem Relation Age of Onset   • Heart disease Mother    • Hypertension Mother    • Heart disease Father    • Hypertension Father    • Other Father      CABG, heart valve replacement   • Coronary artery disease Father        Social History:   reports that he has never smoked. He has never used smokeless tobacco. He reports that he does not drink alcohol or use illicit drugs.    Medications:   Prescriptions Prior to Admission   Medication Sig Dispense Refill Last Dose   • aspirin 325 MG tablet Take 325 mg by mouth daily.   3/15/2017 at Unknown time   • atorvastatin (LIPITOR) 80 MG tablet Take 1 tablet by mouth Daily. 90 tablet 3 3/15/2017 at Unknown time   • esomeprazole (NexIUM) 40 MG capsule TAKE 1 CAPSULE DAILY.      PLEASE MAKE AN APPOINTMENT WITH YOUR DOCTOR. 90 capsule 0 3/15/2017 at Unknown time   • lisinopril (PRINIVIL,ZESTRIL) 2.5 MG tablet TAKE 1 TABLET DAILY 90 tablet 1 3/15/2017 at Unknown time   • metoprolol succinate XL (TOPROL-XL) 25 MG 24 hr tablet TAKE 1 TABLET  DAILY 90 tablet 1 3/15/2017 at Unknown time       Allergies:  Other    ROS:    Pertinent items are noted in HPI, all other systems reviewed and negative     Objective     Blood pressure 131/66, pulse (!) 47, temperature 98.6 °F (37 °C), temperature source Oral, resp. rate 12, weight 166 lb 3 oz (75.4 kg), SpO2 96 %.    Physical Exam   Constitutional: Pt is oriented to person, place, and time and well-developed, well-nourished, and in no distress.   HENT:   Mouth/Throat: Oropharynx is clear and moist.   Neck: Normal range of motion. Neck supple.   Cardiovascular: Normal rate, regular rhythm and normal heart sounds.    Pulmonary/Chest: Effort normal and breath sounds normal. No respiratory distress. No  wheezes.   Abdominal: Soft. Bowel sounds are normal.   Skin: Skin is warm and dry.   Psychiatric: Mood, memory, affect and judgment normal.     Assessment/Plan     Diagnosis:  GERD  Abdominal pain    Anticipated Surgical Procedure:  EGD    The risks, benefits, and alternatives of this procedure have been discussed with the patient or the responsible party- the patient understands and agrees to proceed.

## 2017-03-15 NOTE — ANESTHESIA POSTPROCEDURE EVALUATION
Patient: Chino Huber    Procedure Summary     Date Anesthesia Start Anesthesia Stop Room / Location    03/15/17 1417 1438  [facility] /  [facility]       Procedure Diagnosis Surgeon Provider    ESOPHAGOGASTRODUODENOSCOPY WITH BIOPSIES (COLD) (N/A Esophagus) Esophagitis; Hiatal hernia; Gastritis; Duodenitis  (Heartburn [R12]; Gastroesophageal reflux disease, esophagitis presence not specified [K21.9]) MD Bryn Mckenzie MD          Anesthesia Type: MAC  Last vitals  BP 99/58 (03/15/17 1437)    Temp 37 °C (98.6 °F) (03/15/17 1349)    Pulse (!) 47 (03/15/17 1437)   Resp 12 (03/15/17 1437)    SpO2 98 % (03/15/17 1437)      Post Anesthesia Care and Evaluation    Patient location during evaluation: PHASE II  Anesthetic complications: No anesthetic complications

## 2017-03-15 NOTE — PLAN OF CARE
Problem: Patient Care Overview (Adult)  Goal: Plan of Care Review  Outcome: Ongoing (interventions implemented as appropriate)    03/15/17 1339   Coping/Psychosocial Response Interventions   Plan Of Care Reviewed With patient       Goal: Adult Individualization and Mutuality  Outcome: Ongoing (interventions implemented as appropriate)    03/15/17 1339   Individualization   Patient Specific Preferences none       Goal: Discharge Needs Assessment  Outcome: Ongoing (interventions implemented as appropriate)    03/15/17 1339   Discharge Needs Assessment   Concerns To Be Addressed no discharge needs identified   Living Environment   Transportation Available family or friend will provide         Problem: GI Endoscopy (Adult)  Goal: Signs and Symptoms of Listed Potential Problems Will be Absent or Manageable (GI Endoscopy)  Outcome: Ongoing (interventions implemented as appropriate)    03/15/17 1339   GI Endoscopy   Problems Assessed (GI Endoscopy) pain   Problems Present (GI Endoscopy) none

## 2017-03-16 LAB
CYTO UR: NORMAL
LAB AP CASE REPORT: NORMAL
Lab: NORMAL
PATH REPORT.FINAL DX SPEC: NORMAL
PATH REPORT.GROSS SPEC: NORMAL
UREASE TISS QL: NEGATIVE

## 2017-03-17 ENCOUNTER — TELEPHONE (OUTPATIENT)
Dept: GASTROENTEROLOGY | Facility: CLINIC | Age: 62
End: 2017-03-17

## 2017-03-17 NOTE — TELEPHONE ENCOUNTER
----- Message from Fam LILLY MD sent at 3/16/2017  4:36 PM EDT -----  Regarding: Biopsy results   Okay to call results, recommend continued PPI  ----- Message -----     From: Lab, Background User     Sent: 3/16/2017   1:58 PM       To: Fam LILLY MD

## 2017-03-17 NOTE — TELEPHONE ENCOUNTER
Call to pt.  Advise per path report that duodenal bx with no significant histologic abn.  Gastric antrum bx with reactive gastropathy, no H pylori.  GE junction with mild chronic active inflammation and reactive change. Advise per Dr Rose that recommend continued PPI.    Pt verb understanding and states continues to note pain under R ribs extending to R hip.  Ranks at a 4 on pain scale.  States notes most when sitting - alleviates somewhat with standing.  On Probiotic and fiber - no issues with stools.  States will discuss this further with Dr Rose at o/v of 3/21/17.    Update to Dr Rose.

## 2017-03-20 RX ORDER — METOPROLOL SUCCINATE 25 MG/1
TABLET, EXTENDED RELEASE ORAL
Qty: 90 TABLET | Refills: 2 | Status: SHIPPED | OUTPATIENT
Start: 2017-03-20 | End: 2017-12-31 | Stop reason: SDUPTHER

## 2017-03-21 ENCOUNTER — OFFICE VISIT (OUTPATIENT)
Dept: GASTROENTEROLOGY | Facility: CLINIC | Age: 62
End: 2017-03-21

## 2017-03-21 VITALS
DIASTOLIC BLOOD PRESSURE: 80 MMHG | HEIGHT: 67 IN | BODY MASS INDEX: 26.4 KG/M2 | SYSTOLIC BLOOD PRESSURE: 110 MMHG | WEIGHT: 168.2 LBS

## 2017-03-21 DIAGNOSIS — K57.30 DIVERTICULOSIS OF LARGE INTESTINE WITHOUT HEMORRHAGE: ICD-10-CM

## 2017-03-21 DIAGNOSIS — R10.31 RIGHT LOWER QUADRANT ABDOMINAL PAIN: Primary | ICD-10-CM

## 2017-03-21 DIAGNOSIS — K63.5 COLON POLYPS: ICD-10-CM

## 2017-03-21 DIAGNOSIS — K21.9 GASTROESOPHAGEAL REFLUX DISEASE, ESOPHAGITIS PRESENCE NOT SPECIFIED: ICD-10-CM

## 2017-03-21 PROCEDURE — 99213 OFFICE O/P EST LOW 20 MIN: CPT | Performed by: INTERNAL MEDICINE

## 2017-03-21 NOTE — PROGRESS NOTES
Chief Complaint   Patient presents with   • Heartburn   • Abdominal Pain        Chino Huber is a  61 y.o. male here for a follow up visit for abdominal pain, heartburn    HPI this 61-year-old white male returns in follow-up since recent upper endoscopy performed on March 15.  That study was to assess problems with reflux and did reveal some mild inflammation at the GE junction.  He is controlling this with a proton pump inhibitor.  He also complained of some right flank and right lower abdominal pain in the past and a colonoscopy that was performed in February was notable for diverticulosis and polyps.  The pain seems to radiate down into the right hip, I encouraged him to consider further evaluation by his primary care tender Dr. Salcedo.  Given his history of polyps and family history he has been advised to have follow-up colonoscopy in 3 years time.  He will follow-up in the interim as needed.    Past Medical History   Diagnosis Date   • Acute sinusitis    • Aortic root dilatation    • Aortic stenosis    • Arteriosclerotic coronary artery disease    • CAD (coronary artery disease)    • Chest pain    • Cough    • Dilated aortic root    • Folliculitis    • GERD (gastroesophageal reflux disease)    • Heartburn    • Hyperlipidemia    • Hypertension    • Nonspecific abnormal findings on radiological and examination of intrathoracic organs    • Reflux esophagitis    • Sore throat    • Throat pain        Current Outpatient Prescriptions   Medication Sig Dispense Refill   • aspirin 325 MG tablet Take 325 mg by mouth daily.     • atorvastatin (LIPITOR) 80 MG tablet Take 1 tablet by mouth Daily. 90 tablet 3   • esomeprazole (NexIUM) 40 MG capsule TAKE 1 CAPSULE DAILY.      PLEASE MAKE AN APPOINTMENT WITH YOUR DOCTOR. 90 capsule 0   • lisinopril (PRINIVIL,ZESTRIL) 2.5 MG tablet TAKE 1 TABLET DAILY 90 tablet 1   • metoprolol succinate XL (TOPROL-XL) 25 MG 24 hr tablet TAKE 1 TABLET DAILY 90 tablet 2     No current  facility-administered medications for this visit.        PRN Meds:.    No Known Allergies    Social History     Social History   • Marital status:      Spouse name: N/A   • Number of children: N/A   • Years of education: N/A     Occupational History   • Not on file.     Social History Main Topics   • Smoking status: Never Smoker   • Smokeless tobacco: Never Used      Comment: caffeine use   • Alcohol use No   • Drug use: No   • Sexual activity: Defer     Other Topics Concern   • Not on file     Social History Narrative       Family History   Problem Relation Age of Onset   • Heart disease Mother    • Hypertension Mother    • Colon cancer Mother    • Heart disease Father    • Hypertension Father    • Other Father      CABG, heart valve replacement   • Coronary artery disease Father        Review of Systems   Constitutional: Negative for activity change, appetite change, fatigue and unexpected weight change.   HENT: Negative for congestion, facial swelling, sore throat, trouble swallowing and voice change.    Eyes: Negative for photophobia and visual disturbance.   Respiratory: Negative for cough and choking.    Cardiovascular: Negative for chest pain.   Gastrointestinal: Negative for abdominal distention, abdominal pain, anal bleeding, blood in stool, constipation, diarrhea, nausea, rectal pain and vomiting.   Endocrine: Negative for polyphagia.   Musculoskeletal: Negative for arthralgias, gait problem and joint swelling.        Right flank and hip pain   Skin: Negative for color change, pallor and rash.   Allergic/Immunologic: Negative for food allergies.   Neurological: Negative for speech difficulty and headaches.   Hematological: Does not bruise/bleed easily.   Psychiatric/Behavioral: Negative for agitation, confusion and sleep disturbance.       Vitals:    03/21/17 0903   BP: 110/80       Physical Exam   Constitutional: He is oriented to person, place, and time. He appears well-developed and  well-nourished.   HENT:   Head: Normocephalic.   Mouth/Throat: Oropharynx is clear and moist.   Eyes: Conjunctivae and EOM are normal.   Neck: Normal range of motion.   Cardiovascular: Normal rate and regular rhythm.    Pulmonary/Chest: Breath sounds normal.   Abdominal: Soft. Bowel sounds are normal.   Musculoskeletal: Normal range of motion.   Neurological: He is alert and oriented to person, place, and time.   Skin: Skin is warm and dry.   Psychiatric: He has a normal mood and affect. His behavior is normal.       ASSESSMENT   #1 right sided pain: Appears to be more musculoskeletal  #2 GERD: Treated with PPI  #3 history of polyps      PLAN  Follow-up colonoscopy in 3 years  Office follow up sooner as needed      ICD-10-CM ICD-9-CM   1. Right lower quadrant abdominal pain R10.31 789.03   2. Gastroesophageal reflux disease, esophagitis presence not specified K21.9 530.81   3. Colon polyps K63.5 211.3   4. Diverticulosis of large intestine without hemorrhage K57.30 562.10

## 2017-04-12 ENCOUNTER — TELEPHONE (OUTPATIENT)
Dept: FAMILY MEDICINE CLINIC | Facility: CLINIC | Age: 62
End: 2017-04-12

## 2017-04-12 NOTE — TELEPHONE ENCOUNTER
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 He wants to know are you familar with this and are you comfortable doing this?

## 2017-04-24 RX ORDER — LISINOPRIL 2.5 MG/1
TABLET ORAL
Qty: 90 TABLET | Refills: 2 | Status: SHIPPED | OUTPATIENT
Start: 2017-04-24 | End: 2018-01-15 | Stop reason: SDUPTHER

## 2017-08-01 ENCOUNTER — TRANSCRIBE ORDERS (OUTPATIENT)
Dept: ADMINISTRATIVE | Facility: HOSPITAL | Age: 62
End: 2017-08-01

## 2017-08-01 DIAGNOSIS — C61 PROSTATE CANCER (HCC): Primary | ICD-10-CM

## 2017-08-07 ENCOUNTER — HOSPITAL ENCOUNTER (OUTPATIENT)
Dept: NUCLEAR MEDICINE | Facility: HOSPITAL | Age: 62
Discharge: HOME | End: 2017-08-07
Attending: UROLOGY

## 2017-08-07 DIAGNOSIS — C61 PROSTATE CANCER (HCC): ICD-10-CM

## 2017-08-07 PROCEDURE — 0 TECHNETIUM MEDRONATE KIT: Performed by: UROLOGY

## 2017-08-07 PROCEDURE — A9503 TC99M MEDRONATE: HCPCS | Performed by: UROLOGY

## 2017-08-07 PROCEDURE — 78306 BONE IMAGING WHOLE BODY: CPT

## 2017-08-07 RX ORDER — TC 99M MEDRONATE 20 MG/10ML
22.5 INJECTION, POWDER, LYOPHILIZED, FOR SOLUTION INTRAVENOUS
Status: COMPLETED | OUTPATIENT
Start: 2017-08-07 | End: 2017-08-07

## 2017-08-07 RX ADMIN — Medication 22.5 MILLICURIE: at 10:38

## 2017-09-20 ENCOUNTER — OFFICE VISIT (OUTPATIENT)
Dept: CARDIOLOGY | Facility: CLINIC | Age: 62
End: 2017-09-20

## 2017-09-20 VITALS
WEIGHT: 168 LBS | HEIGHT: 67 IN | HEART RATE: 54 BPM | DIASTOLIC BLOOD PRESSURE: 68 MMHG | BODY MASS INDEX: 26.37 KG/M2 | SYSTOLIC BLOOD PRESSURE: 130 MMHG

## 2017-09-20 DIAGNOSIS — I71.20 THORACIC AORTIC ANEURYSM WITHOUT RUPTURE (HCC): Primary | ICD-10-CM

## 2017-09-20 DIAGNOSIS — Q23.1 BICUSPID AORTIC VALVE: ICD-10-CM

## 2017-09-20 DIAGNOSIS — I25.10 CHRONIC CORONARY ARTERY DISEASE: ICD-10-CM

## 2017-09-20 DIAGNOSIS — I35.0 NONRHEUMATIC AORTIC VALVE STENOSIS: ICD-10-CM

## 2017-09-20 DIAGNOSIS — I10 HYPERTENSION, ESSENTIAL: ICD-10-CM

## 2017-09-20 DIAGNOSIS — I77.810 AORTIC ROOT DILATATION (HCC): ICD-10-CM

## 2017-09-20 PROCEDURE — 99213 OFFICE O/P EST LOW 20 MIN: CPT | Performed by: INTERNAL MEDICINE

## 2017-09-20 PROCEDURE — 93000 ELECTROCARDIOGRAM COMPLETE: CPT | Performed by: INTERNAL MEDICINE

## 2017-09-20 NOTE — PROGRESS NOTES
Date of Office Visit: 2017  Encounter Provider: Kisha Berman MD  Place of Service: [facility]  Patient Name: Chino Huber  :1955    Chief complaint   Follow-up of coronary artery disease, bicuspid aortic valve and dilated aortic root    History of Present Illness  The patient is a pleasant, 61year-old gentleman with history of prostate cancer status   post resection, hypertension, and hyperlipidemia who in 2014 was noted to have coronary   artery disease when he presented with chest pain and abnormal stress test. A cardiac   catheterization revealed 90% stenosis of the circumflex artery and of the right coronary   artery. The left anterior descending and left main were normal. He underwent drug-coated   stent placement to both the circumflex and the right coronary artery.  He had a CT angio of his chest in May 2015 that revealed stable thoracic aneurysm measuring 4.0 cm. He was seen in 2016 with recurrent chest pain.  Echocardiogram revealed an ejection fraction 67% with bicuspid aortic valve with mild aortic valve stenosis.  No significant regurgitation the aortic root was borderline dilated. Subsequently had a stress perfusion study that was negative for ischemia he now presents a follow-up.    Since last visit he denies any chest pain, shortness of breath, palpitations, syncope near syncope.  His blood pressure in the morning hours slightly elevated in the 130s though it falls to the 1 teens to 120s by midday.  He walks 30 minutes on the treadmill achieving 2 miles and 9500 steps almost daily.     Past Medical History:   Diagnosis Date   • Acute sinusitis    • Aortic root dilatation    • Aortic stenosis    • Arteriosclerotic coronary artery disease    • CAD (coronary artery disease)    • Chest pain    • Cough    • Dilated aortic root    • Folliculitis    • GERD (gastroesophageal reflux disease)    • Heartburn    • Hyperlipidemia    • Hypertension     • Nonspecific abnormal findings on radiological and examination of intrathoracic organs    • Reflux esophagitis    • Sore throat    • Throat pain      Past Surgical History:   Procedure Laterality Date   • COLONOSCOPY     • COLONOSCOPY     • COLONOSCOPY N/A 2/3/2017    Procedure: COLONOSCOPY TO CECUM WITH HOT SNARE POLYPECTOMY WITH NORMAL SALINE INJECTION AND  TWO RESOLUTION CLIPS;  Surgeon: Fam LILLY MD;  Location: [facility] ENDOSCOPY;  Service:    • CORONARY ANGIOPLASTY WITH STENT PLACEMENT     • ENDOSCOPY N/A 3/15/2017    Procedure: ESOPHAGOGASTRODUODENOSCOPY WITH BIOPSIES (COLD);  Surgeon: Fam LILLY MD;  Location: [facility] ENDOSCOPY;  Service:    • PROSTATE SURGERY     • PROSTATE SURGERY     • RHINOPLASTY     • RHINOPLASTY       Outpatient Medications Prior to Visit   Medication Sig Dispense Refill   • aspirin 325 MG tablet Take 325 mg by mouth daily.     • atorvastatin (LIPITOR) 80 MG tablet Take 1 tablet by mouth Daily. 90 tablet 3   • esomeprazole (NexIUM) 40 MG capsule TAKE 1 CAPSULE DAILY.      PLEASE MAKE AN APPOINTMENT WITH YOUR DOCTOR. 90 capsule 0   • lisinopril (PRINIVIL,ZESTRIL) 2.5 MG tablet TAKE 1 TABLET DAILY 90 tablet 2   • metoprolol succinate XL (TOPROL-XL) 25 MG 24 hr tablet TAKE 1 TABLET DAILY 90 tablet 2     No facility-administered medications prior to visit.      Allergies as of 09/20/2017   • (No Known Allergies)     Social History     Social History   • Marital status:      Spouse name: N/A   • Number of children: N/A   • Years of education: N/A     Occupational History   • Not on file.     Social History Main Topics   • Smoking status: Never Smoker   • Smokeless tobacco: Never Used      Comment: caffeine use   • Alcohol use No   • Drug use: No   • Sexual activity: Defer     Other Topics Concern   • Not on file     Social History Narrative     Family History   Problem Relation Age of Onset   • Heart disease Mother    • Hypertension Mother    • Colon cancer Mother  "   • Heart disease Father    • Hypertension Father    • Other Father      CABG, heart valve replacement   • Coronary artery disease Father      Review of Systems   Constitution: Negative for fever, malaise/fatigue, weight gain and weight loss.   HENT: Negative for ear pain, hearing loss, nosebleeds and sore throat.    Eyes: Negative for double vision, pain, vision loss in left eye and vision loss in right eye.   Cardiovascular:        See history of present illness.   Respiratory: Positive for snoring. Negative for cough, shortness of breath, sleep disturbances due to breathing and wheezing.    Endocrine: Negative for cold intolerance, heat intolerance and polyuria.   Skin: Negative for itching, poor wound healing and rash.   Musculoskeletal: Negative for joint pain, joint swelling and myalgias.   Gastrointestinal: Negative for abdominal pain, diarrhea, hematochezia, nausea and vomiting.   Genitourinary: Negative for hematuria and hesitancy.   Neurological: Negative for numbness, paresthesias and seizures.   Psychiatric/Behavioral: Negative for depression. The patient is not nervous/anxious.      Objective:     Vitals:    09/20/17 1247   BP: 130/68   Pulse: 54   Weight: 168 lb (76.2 kg)   Height: 67\" (170.2 cm)     Body mass index is 26.31 kg/(m^2).    Physical Exam   Constitutional: He is oriented to person, place, and time. He appears well-developed and well-nourished.   HENT:   Head: Normocephalic.   Nose: Nose normal.   Mouth/Throat: Oropharynx is clear and moist.   Eyes: Conjunctivae and EOM are normal. Pupils are equal, round, and reactive to light. Right eye exhibits no discharge. No scleral icterus.   Neck: Normal range of motion. Neck supple. No JVD present. No thyromegaly present.   Cardiovascular: Normal rate, regular rhythm and intact distal pulses.  Exam reveals no gallop and no friction rub.    Murmur heard.   Harsh midsystolic murmur is present with a grade of 2/6  at the upper right sternal border " radiating to the neck  Pulses:       Carotid pulses are 2+ on the right side, and 2+ on the left side.       Radial pulses are 2+ on the right side, and 2+ on the left side.        Femoral pulses are 2+ on the right side, and 2+ on the left side.       Popliteal pulses are 2+ on the right side, and 2+ on the left side.        Dorsalis pedis pulses are 2+ on the right side, and 2+ on the left side.        Posterior tibial pulses are 2+ on the right side, and 2+ on the left side.   Pulmonary/Chest: Effort normal and breath sounds normal. No respiratory distress. He has no wheezes. He has no rales.   Abdominal: Soft. Bowel sounds are normal. He exhibits no distension. There is no hepatosplenomegaly. There is no tenderness. There is no rebound.   Musculoskeletal: Normal range of motion. He exhibits no edema or tenderness.   Neurological: He is alert and oriented to person, place, and time.   Skin: Skin is warm and dry. No rash noted. No erythema.   Psychiatric: He has a normal mood and affect. His behavior is normal. Judgment and thought content normal.   Vitals reviewed.    Lab Review:     ECG 12 Lead  Date/Time: 9/20/2017 10:19 PM  Performed by: PARVEEN WILKINS  Authorized by: PARVEEN WILKINS   Comparison: compared with previous ECG   Similar to previous ECG  Rhythm: sinus bradycardia  Conduction comments: QTc =391 msec  Clinical impression: normal ECG          Assessment:       Diagnosis Plan   1. Thoracic aortic aneurysm without rupture  CT Angiogram Chest With & Without Contrast    Adult Transthoracic Echo Complete W/ Cont if Necessary Per Protocol   2. Hypertension, essential     3. Chronic coronary artery disease     4. Bicuspid aortic valve     5. Nonrheumatic aortic valve stenosis     6. Aortic root dilatation       Plan:       1.  Coronary artery disease with history of prior stenting and negative stress test in June 2016.  He has no anginal symptoms at all at this point we'll continue with lifestyle changes and  follow-up in one year.  2.  Borderline dilated aorta, we will check a CT angiogram of his chest this year and consider MR angiography next year.  The prior echocardiogram had suggested aortic root dilatation but the aortic size was not large.  Given this discrepancy will continue with radiographic imaging for follow-up  3.  Mild aortic valve stenosis.  Clinically stable and will defer echocardiographic imaging for now and plan on follow-up in one year at that time depending on his murmur and clinical course will check a follow-up echocardiogram.  4.  Elevated liver function tests and abdominal pain.  I again asked him to follow-up with Dr. Salcedo regarding this  5.  GE reflux disease    Coronary Artery Disease  Assessment  • The patient has no angina    Plan  • Lifestyle modifications discussed include adhering to a heart healthy diet, maintenance of a healthy weight, medication compliance, regular exercise and regular monitoring of cholesterol and blood pressure    Subjective - Objective  • There has been a previous stent procedure using JONAS  • Current antiplatelet therapy includes aspirin 325 mg         Chino Huber   Home Medication Instructions CLAIRE:    Printed on:09/20/17 5817   Medication Information                      aspirin 325 MG tablet  Take 325 mg by mouth daily.             atorvastatin (LIPITOR) 80 MG tablet  Take 1 tablet by mouth Daily.             esomeprazole (NexIUM) 40 MG capsule  TAKE 1 CAPSULE DAILY.      PLEASE MAKE AN APPOINTMENT WITH YOUR DOCTOR.             lisinopril (PRINIVIL,ZESTRIL) 2.5 MG tablet  TAKE 1 TABLET DAILY             metoprolol succinate XL (TOPROL-XL) 25 MG 24 hr tablet  TAKE 1 TABLET DAILY               Dictated utilizing Dragon dictation

## 2017-09-25 ENCOUNTER — HOSPITAL ENCOUNTER (OUTPATIENT)
Dept: CT IMAGING | Facility: HOSPITAL | Age: 62
Discharge: HOME | End: 2017-09-25
Attending: INTERNAL MEDICINE | Admitting: INTERNAL MEDICINE

## 2017-09-25 PROCEDURE — 71275 CT ANGIOGRAPHY CHEST: CPT

## 2017-09-25 PROCEDURE — 0 IOPAMIDOL PER 1 ML: Performed by: INTERNAL MEDICINE

## 2017-09-25 RX ADMIN — IOPAMIDOL 100 ML: 755 INJECTION, SOLUTION INTRAVENOUS at 10:24

## 2017-10-01 ENCOUNTER — TELEPHONE (OUTPATIENT)
Dept: CARDIOLOGY | Facility: CLINIC | Age: 62
End: 2017-10-01

## 2017-10-05 NOTE — TELEPHONE ENCOUNTER
I talked to patient regarding test results.  He will follow-up with his primary care physician regarding blood work.  We'll see him back in one year and check an echocardiogram at that time. padilla

## 2017-12-20 ENCOUNTER — OFFICE VISIT (OUTPATIENT)
Dept: FAMILY MEDICINE CLINIC | Facility: CLINIC | Age: 62
End: 2017-12-20

## 2017-12-20 VITALS
HEART RATE: 60 BPM | DIASTOLIC BLOOD PRESSURE: 70 MMHG | HEIGHT: 67 IN | OXYGEN SATURATION: 95 % | BODY MASS INDEX: 26.7 KG/M2 | TEMPERATURE: 98.3 F | WEIGHT: 170.1 LBS | SYSTOLIC BLOOD PRESSURE: 118 MMHG

## 2017-12-20 DIAGNOSIS — J20.9 ACUTE BRONCHITIS, UNSPECIFIED ORGANISM: Primary | ICD-10-CM

## 2017-12-20 PROCEDURE — 99213 OFFICE O/P EST LOW 20 MIN: CPT | Performed by: NURSE PRACTITIONER

## 2017-12-20 NOTE — PROGRESS NOTES
Scratchy throat Thursday slowly developed into cough increased sore throat nasal congestion sinus pressure  Develop fever Sunday some chills  None sounds  Taking OTC Coricidin helps  No chest pain no shortness of breath no body aches or myalgias    Patient's healthy no liver kidney disease no heart disease                            Discharge instructions    Patient has questions regarding whooping cough fax seen  T dap  His daughter is pregnant and due in August    I suggest he try to get this a couple months before delivery  He gets this had his pharmacy  Or he can call the office and see if Dr. Delatorre is okay if he gets this outpatient here

## 2017-12-20 NOTE — PATIENT INSTRUCTIONS
Acute Bronchitis  Bronchitis is inflammation of the airways that extend from the windpipe into the lungs (bronchi). The inflammation often causes mucus to develop. This leads to a cough, which is the most common symptom of bronchitis.   In acute bronchitis, the condition usually develops suddenly and goes away over time, usually in a couple weeks. Smoking, allergies, and asthma can make bronchitis worse. Repeated episodes of bronchitis may cause further lung problems.   CAUSES  Acute bronchitis is most often caused by the same virus that causes a cold. The virus can spread from person to person (contagious) through coughing, sneezing, and touching contaminated objects.  SIGNS AND SYMPTOMS   · Cough.    · Fever.    · Coughing up mucus.    · Body aches.    · Chest congestion.    · Chills.    · Shortness of breath.    · Sore throat.    DIAGNOSIS   Acute bronchitis is usually diagnosed through a physical exam. Your health care provider will also ask you questions about your medical history. Tests, such as chest X-rays, are sometimes done to rule out other conditions.   TREATMENT   Acute bronchitis usually goes away in a couple weeks. Oftentimes, no medical treatment is necessary. Medicines are sometimes given for relief of fever or cough. Antibiotic medicines are usually not needed but may be prescribed in certain situations. In some cases, an inhaler may be recommended to help reduce shortness of breath and control the cough. A cool mist vaporizer may also be used to help thin bronchial secretions and make it easier to clear the chest.   HOME CARE INSTRUCTIONS  · Get plenty of rest.    · Drink enough fluids to keep your urine clear or pale yellow (unless you have a medical condition that requires fluid restriction). Increasing fluids may help thin your respiratory secretions (sputum) and reduce chest congestion, and it will prevent dehydration.    · Take medicines only as directed by your health care provider.  · If  you were prescribed an antibiotic medicine, finish it all even if you start to feel better.  · Avoid smoking and secondhand smoke. Exposure to cigarette smoke or irritating chemicals will make bronchitis worse. If you are a smoker, consider using nicotine gum or skin patches to help control withdrawal symptoms. Quitting smoking will help your lungs heal faster.    · Reduce the chances of another bout of acute bronchitis by washing your hands frequently, avoiding people with cold symptoms, and trying not to touch your hands to your mouth, nose, or eyes.    · Keep all follow-up visits as directed by your health care provider.    SEEK MEDICAL CARE IF:  Your symptoms do not improve after 1 week of treatment.   SEEK IMMEDIATE MEDICAL CARE IF:  · You develop an increased fever or chills.    · You have chest pain.    · You have severe shortness of breath.  · You have bloody sputum.    · You develop dehydration.  · You faint or repeatedly feel like you are going to pass out.  · You develop repeated vomiting.  · You develop a severe headache.  MAKE SURE YOU:   · Understand these instructions.  · Will watch your condition.  · Will get help right away if you are not doing well or get worse.     This information is not intended to replace advice given to you by your health care provider. Make sure you discuss any questions you have with your health care provider.     Document Released: 01/25/2006 Document Revised: 01/08/2016 Document Reviewed: 06/10/2014  [employer] Interactive Patient Education ©2017 [employer]      Discharge instructions    Push fluids    Plenty of rest    If chest pain shortness of breath high fever, late onset fever late onset worsening    Urgent care ER    Follow-up Dr. Salcedo    Thank You,      James Epley, NP

## 2017-12-21 NOTE — PROGRESS NOTES
Subjective   Chino Huber is a 62 y.o. male.     HPI Comments: Scratchy throat Thursday slowly developed into cough increased sore throat nasal congestion sinus pressure  Develop fever Sunday some chills  Mild headache  Taking OTC Coricidin helps  No chest pain no shortness of breath no body aches or myalgias    Patient's healthy no liver kidney disease no heart disease                                   The following portions of the patient's history were reviewed and updated as appropriate: allergies, current medications, past medical history, past social history, past surgical history and problem list.    Review of Systems   Constitutional: Negative for chills and fever.   HENT: Positive for rhinorrhea and sore throat.    Respiratory: Positive for cough. Negative for shortness of breath.    All other systems reviewed and are negative.      Objective   Physical Exam   Constitutional: He is oriented to person, place, and time. He appears well-developed.   Nontoxic appears well   HENT:   Head: Normocephalic.   Mouth/Throat: Oropharynx is clear and moist.   Turbinates congested   Eyes: Conjunctivae are normal. Pupils are equal, round, and reactive to light.   Cardiovascular: Normal rate, regular rhythm and normal heart sounds.    Pulmonary/Chest: Effort normal and breath sounds normal. No respiratory distress. He has no wheezes. He has no rales.   Lymphadenopathy:     He has no cervical adenopathy.   Neurological: He is alert and oriented to person, place, and time.   Skin: Skin is warm and dry.   Psychiatric: He has a normal mood and affect. His behavior is normal. Judgment and thought content normal.   Vitals reviewed.      Assessment/Plan   Chino was seen today for sore throat and headache.    Diagnoses and all orders for this visit:    Acute bronchitis, unspecified organism                  Discharge instructions    Push fluids plenty of rest  Symptomatic treatment may continue OTC Coricidin  Any chest pain  shortness of breath high fever  Or late onset fever late onset worsening seek urgent treatment ER  Follow-up with Dr. Salcedo routine care    Patient has questions regarding whooping cough vaccine  T dap  His daughter is pregnant and due in August 2018    I suggest he try to get this a couple months before delivery, for maximum protection   But he should follow the advice of her OB/GYN  He can get this at his pharmacy or  he can call the office and see if Dr. Delatorre is okay if he gets this outpatient here

## 2018-01-02 RX ORDER — METOPROLOL SUCCINATE 25 MG/1
TABLET, EXTENDED RELEASE ORAL
Qty: 90 TABLET | Refills: 2 | Status: SHIPPED | OUTPATIENT
Start: 2018-01-02 | End: 2018-09-26 | Stop reason: SDUPTHER

## 2018-01-02 RX ORDER — ATORVASTATIN CALCIUM 80 MG/1
TABLET, FILM COATED ORAL
Qty: 90 TABLET | Refills: 3 | Status: SHIPPED | OUTPATIENT
Start: 2018-01-02 | End: 2018-12-18 | Stop reason: SDUPTHER

## 2018-01-02 RX ORDER — ESOMEPRAZOLE MAGNESIUM 40 MG/1
CAPSULE, DELAYED RELEASE ORAL
Qty: 90 CAPSULE | Refills: 3 | Status: SHIPPED | OUTPATIENT
Start: 2018-01-02 | End: 2018-12-18 | Stop reason: SDUPTHER

## 2018-01-15 RX ORDER — LISINOPRIL 2.5 MG/1
TABLET ORAL
Qty: 90 TABLET | Refills: 2 | Status: SHIPPED | OUTPATIENT
Start: 2018-01-15 | End: 2018-09-07 | Stop reason: DRUGHIGH

## 2018-07-26 DIAGNOSIS — I35.0 NONRHEUMATIC AORTIC VALVE STENOSIS: Primary | ICD-10-CM

## 2018-07-26 DIAGNOSIS — I77.819 AORTIC DILATATION (HCC): ICD-10-CM

## 2018-08-19 ENCOUNTER — TELEPHONE (OUTPATIENT)
Dept: CARDIOLOGY | Facility: CLINIC | Age: 63
End: 2018-08-19

## 2018-08-31 ENCOUNTER — HOSPITAL ENCOUNTER (EMERGENCY)
Facility: HOSPITAL | Age: 63
Discharge: HOME | End: 2018-09-01
Attending: EMERGENCY MEDICINE | Admitting: EMERGENCY MEDICINE

## 2018-08-31 ENCOUNTER — APPOINTMENT (OUTPATIENT)
Dept: GENERAL RADIOLOGY | Facility: HOSPITAL | Age: 63
End: 2018-08-31

## 2018-08-31 DIAGNOSIS — R07.2 PRECORDIAL PAIN: Primary | ICD-10-CM

## 2018-08-31 LAB
BASOPHILS # BLD AUTO: 0.06 10*3/MM3 (ref 0–0.2)
BASOPHILS NFR BLD AUTO: 0.6 % (ref 0–2)
DEPRECATED RDW RBC AUTO: 40.3 FL (ref 37–54)
EOSINOPHIL # BLD AUTO: 0.2 10*3/MM3 (ref 0.1–0.3)
EOSINOPHIL NFR BLD AUTO: 2 % (ref 0–4)
ERYTHROCYTE [DISTWIDTH] IN BLOOD BY AUTOMATED COUNT: 12.7 % (ref 11.5–14.5)
HCT VFR BLD AUTO: 47.1 % (ref 42–52)
HGB BLD-MCNC: 16 G/DL (ref 14–18)
IMM GRANULOCYTES # BLD: 0.04 10*3/MM3 (ref 0–0.03)
IMM GRANULOCYTES NFR BLD: 0.4 % (ref 0–0.5)
LYMPHOCYTES # BLD AUTO: 3.67 10*3/MM3 (ref 0.6–4.8)
LYMPHOCYTES NFR BLD AUTO: 37.5 % (ref 20–45)
MCH RBC QN AUTO: 29.7 PG (ref 27–31)
MCHC RBC AUTO-ENTMCNC: 34 G/DL (ref 31–37)
MCV RBC AUTO: 87.4 FL (ref 80–94)
MONOCYTES # BLD AUTO: 1.08 10*3/MM3 (ref 0–1)
MONOCYTES NFR BLD AUTO: 11 % (ref 3–8)
NEUTROPHILS # BLD AUTO: 4.74 10*3/MM3 (ref 1.5–8.3)
NEUTROPHILS NFR BLD AUTO: 48.5 % (ref 45–70)
NRBC BLD MANUAL-RTO: 0 /100 WBC (ref 0–0)
PLATELET # BLD AUTO: 222 10*3/MM3 (ref 140–500)
PMV BLD AUTO: 10.1 FL (ref 7.4–10.4)
RBC # BLD AUTO: 5.39 10*6/MM3 (ref 4.7–6.1)
WBC NRBC COR # BLD: 9.79 10*3/MM3 (ref 4.8–10.8)

## 2018-08-31 PROCEDURE — 93005 ELECTROCARDIOGRAM TRACING: CPT | Performed by: EMERGENCY MEDICINE

## 2018-08-31 PROCEDURE — 85730 THROMBOPLASTIN TIME PARTIAL: CPT | Performed by: EMERGENCY MEDICINE

## 2018-08-31 PROCEDURE — 84484 ASSAY OF TROPONIN QUANT: CPT | Performed by: EMERGENCY MEDICINE

## 2018-08-31 PROCEDURE — 85025 COMPLETE CBC W/AUTO DIFF WBC: CPT | Performed by: EMERGENCY MEDICINE

## 2018-08-31 PROCEDURE — 83880 ASSAY OF NATRIURETIC PEPTIDE: CPT | Performed by: EMERGENCY MEDICINE

## 2018-08-31 PROCEDURE — 99285 EMERGENCY DEPT VISIT HI MDM: CPT

## 2018-08-31 PROCEDURE — 85610 PROTHROMBIN TIME: CPT | Performed by: EMERGENCY MEDICINE

## 2018-08-31 PROCEDURE — 71046 X-RAY EXAM CHEST 2 VIEWS: CPT

## 2018-08-31 PROCEDURE — 80053 COMPREHEN METABOLIC PANEL: CPT | Performed by: EMERGENCY MEDICINE

## 2018-08-31 PROCEDURE — 99284 EMERGENCY DEPT VISIT MOD MDM: CPT | Performed by: EMERGENCY MEDICINE

## 2018-08-31 PROCEDURE — 93010 ELECTROCARDIOGRAM REPORT: CPT | Performed by: INTERNAL MEDICINE

## 2018-08-31 RX ORDER — SODIUM CHLORIDE 0.9 % (FLUSH) 0.9 %
10 SYRINGE (ML) INJECTION AS NEEDED
Status: DISCONTINUED | OUTPATIENT
Start: 2018-08-31 | End: 2018-09-01 | Stop reason: HOSPADM

## 2018-08-31 RX ORDER — CETIRIZINE HYDROCHLORIDE 10 MG/1
10 TABLET ORAL AS NEEDED
COMMUNITY
End: 2021-09-08

## 2018-09-01 VITALS
OXYGEN SATURATION: 94 % | HEART RATE: 45 BPM | HEIGHT: 67 IN | SYSTOLIC BLOOD PRESSURE: 152 MMHG | RESPIRATION RATE: 18 BRPM | TEMPERATURE: 98.5 F | DIASTOLIC BLOOD PRESSURE: 88 MMHG | BODY MASS INDEX: 27.63 KG/M2 | WEIGHT: 176.06 LBS

## 2018-09-01 LAB
ALBUMIN SERPL-MCNC: 4.3 G/DL (ref 3.5–5.2)
ALBUMIN/GLOB SERPL: 2 G/DL
ALP SERPL-CCNC: 118 U/L (ref 40–129)
ALT SERPL W P-5'-P-CCNC: 27 U/L (ref 5–41)
ANION GAP SERPL CALCULATED.3IONS-SCNC: 12.5 MMOL/L
APTT PPP: 24.1 SECONDS (ref 24.3–38.1)
AST SERPL-CCNC: 18 U/L (ref 5–40)
BILIRUB SERPL-MCNC: 0.4 MG/DL (ref 0.2–1.2)
BUN BLD-MCNC: 17 MG/DL (ref 8–23)
BUN/CREAT SERPL: 14.9 (ref 7–25)
CALCIUM SPEC-SCNC: 9 MG/DL (ref 8.8–10.5)
CHLORIDE SERPL-SCNC: 102 MMOL/L (ref 98–107)
CO2 SERPL-SCNC: 25.5 MMOL/L (ref 22–29)
CREAT BLD-MCNC: 1.14 MG/DL (ref 0.76–1.27)
GFR SERPL CREATININE-BSD FRML MDRD: 65 ML/MIN/1.73
GLOBULIN UR ELPH-MCNC: 2.2 GM/DL
GLUCOSE BLD-MCNC: 88 MG/DL (ref 65–99)
INR PPP: 1.02 (ref 0.9–1.1)
NT-PROBNP SERPL-MCNC: 31.1 PG/ML (ref 0–900)
POTASSIUM BLD-SCNC: 3.5 MMOL/L (ref 3.5–5.2)
PROT SERPL-MCNC: 6.5 G/DL (ref 6–8.5)
PROTHROMBIN TIME: 13.4 SECONDS (ref 12.1–15)
SODIUM BLD-SCNC: 140 MMOL/L (ref 136–145)
TROPONIN T SERPL-MCNC: <0.01 NG/ML (ref 0–0.03)
TROPONIN T SERPL-MCNC: <0.01 NG/ML (ref 0–0.03)

## 2018-09-01 PROCEDURE — 84484 ASSAY OF TROPONIN QUANT: CPT | Performed by: EMERGENCY MEDICINE

## 2018-09-01 PROCEDURE — 93010 ELECTROCARDIOGRAM REPORT: CPT | Performed by: INTERNAL MEDICINE

## 2018-09-01 PROCEDURE — 93005 ELECTROCARDIOGRAM TRACING: CPT | Performed by: EMERGENCY MEDICINE

## 2018-09-01 RX ORDER — NITROGLYCERIN 0.3 MG/1
0.3 TABLET SUBLINGUAL
Qty: 15 TABLET | Refills: 0 | Status: SHIPPED | OUTPATIENT
Start: 2018-09-01 | End: 2018-10-01 | Stop reason: SDDI

## 2018-09-01 NOTE — ED PROVIDER NOTES
"Subjective   Mr. Chino Huber is a 62-year-old white male who presents secondary to chest pain.  Approximately 1045 this evening patient was drying dishes when he had sudden onset of left-sided chest pain.  He describes the pain as sharp.  It is nonradiating.  Associated nausea but no vomiting.  Mild diaphoresis.  No shortness of breath.  The pain was a level III out of 10.  It lasted approximately 5 minutes.  The pain spontaneously resolved.  Patient has a history of coronary artery disease and is status post stent ×2.  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  Patient presents for evaluation.        History provided by:  Patient  Chest Pain   Pain location:  L chest  Pain quality: sharp    Pain radiates to:  Does not radiate  Pain severity:  Mild  Onset quality:  Sudden  Duration:  5 minutes  Progression:  Resolved  Chronicity:  New  Context comment:  As described above  Relieved by:  Nothing  Worsened by:  Nothing  Ineffective treatments:  None tried  Associated symptoms: anxiety (\"maybe\"), diaphoresis, headache (on an off x 1 week. Relieved with tylenol or ibuprofen.) and nausea    Associated symptoms: no abdominal pain, no back pain, no dizziness, no fever, no palpitations, no shortness of breath, no syncope, no vomiting and no weakness    Risk factors: aortic disease, coronary artery disease, high cholesterol, hypertension and male sex    Risk factors: no diabetes mellitus, no Sushma-Danlos syndrome, no Marfan's syndrome, not obese, no prior DVT/PE and no smoking        Review of Systems   Constitutional: Positive for diaphoresis. Negative for chills and fever.   HENT: Negative for congestion, ear pain and sore throat.    Eyes: Negative for pain and discharge.   Respiratory: Negative for chest tightness, shortness of breath, wheezing and stridor.    Cardiovascular: Positive for chest pain. Negative for palpitations, leg swelling and syncope.   Gastrointestinal: Positive for nausea. " Negative for abdominal pain, diarrhea and vomiting.   Genitourinary: Negative for dysuria, flank pain, frequency and hematuria.   Musculoskeletal: Negative for back pain, myalgias, neck pain and neck stiffness.   Skin: Negative for color change, pallor and rash.   Neurological: Positive for headaches (on an off x 1 week. Relieved with tylenol or ibuprofen.). Negative for dizziness, seizures, syncope and weakness.   Psychiatric/Behavioral: Negative for agitation and confusion. The patient is not nervous/anxious.    All other systems reviewed and are negative.      Past Medical History:   Diagnosis Date   • Acute sinusitis    • Aortic root dilatation (CMS/HCC)    • Aortic stenosis    • Arteriosclerotic coronary artery disease    • CAD (coronary artery disease)    • Chest pain    • Cough    • Dilated aortic root (CMS/HCC)    • Folliculitis    • GERD (gastroesophageal reflux disease)    • Heartburn    • Hyperlipidemia    • Hypertension    • Nonspecific abnormal findings on radiological and examination of intrathoracic organs    • Reflux esophagitis    • Sore throat    • Throat pain        No Known Allergies    Past Surgical History:   Procedure Laterality Date   • COLONOSCOPY     • COLONOSCOPY     • COLONOSCOPY N/A 2/3/2017    Procedure: COLONOSCOPY TO CECUM WITH HOT SNARE POLYPECTOMY WITH NORMAL SALINE INJECTION AND  TWO RESOLUTION CLIPS;  Surgeon: Fam LILLY MD;  Location: [facility];  Service:    • CORONARY ANGIOPLASTY WITH STENT PLACEMENT     • ENDOSCOPY N/A 3/15/2017    Procedure: ESOPHAGOGASTRODUODENOSCOPY WITH BIOPSIES (COLD);  Surgeon: Fam LILLY MD;  Location: [facility];  Service:    • PROSTATE SURGERY     • PROSTATE SURGERY     • RHINOPLASTY     • RHINOPLASTY         Family History   Problem Relation Age of Onset   • Heart disease Mother    • Hypertension Mother    • Colon cancer Mother    • Heart disease Father    • Hypertension Father    • Other Father         CABG, heart  valve replacement   • Coronary artery disease Father        Social History     Social History   • Marital status:      Social History Main Topics   • Smoking status: Never Smoker   • Smokeless tobacco: Never Used      Comment: caffeine use   • Alcohol use No   • Drug use: No   • Sexual activity: Defer     Other Topics Concern   • Not on file           Objective   Physical Exam   Constitutional: He is oriented to person, place, and time. He appears well-developed and well-nourished. No distress.   2-year-old white male laying in bed.  Patient appears in good health for age.  He is friendly and cooperative.  He does not appear in any distress at this time.  Accompanied by his wife.   HENT:   Head: Normocephalic and atraumatic.   Right Ear: External ear normal.   Left Ear: External ear normal.   Nose: Nose normal.   Mouth/Throat: Oropharynx is clear and moist.   Eyes: Pupils are equal, round, and reactive to light. Conjunctivae and EOM are normal.   Neck: Normal range of motion. Neck supple.   Cardiovascular: Normal rate, regular rhythm, normal heart sounds and intact distal pulses.  Exam reveals no gallop and no friction rub.    No murmur heard.  Pulmonary/Chest: Effort normal and breath sounds normal. No stridor. No respiratory distress. He has no wheezes. He has no rales.   Abdominal: Soft. He exhibits no distension. There is no tenderness.   Musculoskeletal: Normal range of motion. He exhibits no edema.   Neurological: He is alert and oriented to person, place, and time. He has normal reflexes. No cranial nerve deficit.   Skin: Skin is warm and dry. No rash noted. He is not diaphoretic. No erythema.   Psychiatric: He has a normal mood and affect. His behavior is normal.   Nursing note and vitals reviewed.      ECG 12 Lead    Date/Time: 8/31/2018 11:29 PM  Performed by: YAA CRUZ  Authorized by: YAA CRUZ   Interpreted by physician  Comparison: compared with previous ECG from  9/20/2017  Similar to previous ECG  Rhythm: sinus bradycardia  Rate: bradycardic  QRS axis: normal  Conduction: conduction normal  ST Segments: ST segments normal  T Waves: T waves normal  Other findings: LAE  Clinical impression: non-specific ECG      ECG 12 Lead    Date/Time: 9/1/2018 1:35 AM  Performed by: YAA CRUZ  Authorized by: YAA CRUZ   Interpreted by physician  Comparison: compared with previous ECG from 8/31/2018  Similar to previous ECG  Rhythm: sinus bradycardia  Rate: bradycardic  QRS axis: normal  Conduction: conduction normal  ST Segments: ST segments normal  T Waves: T waves normal  Other: no other findings  Clinical impression: non-specific ECG                 ED Course  ED Course as of Sep 01 0301   Fri Aug 31, 2018   2338 Patient had left-sided chest pain lasting approximately 5 minutes.  Associated nausea and diaphoresis.  The pain resolved spontaneously.  EKG shows left axis deviation otherwise unremarkable. Will obtain chest x-ray and labs including cardiac enzymes.  [SS]   Sat Sep 01, 2018   0045 The patient's lab work is unremarkable.  As his chest x-ray.  Cardiac enzymes likewise unremarkable.  [SS]   0049 Patient continues to be pain-free as he has been since 4 ER arrival.  Will repeat EKG and troponin at the 2 hour vivian.   If unchanged will DC home.  Discuss with cardiology prior to discharge  [SS]   0148 Repeat EKG is unremarkable.  Awaiting repeat troponin.  [SS]   0239 Repeat troponin likewise unremarkable.  Discussed with Dr. Hess.  Will prescribe nitroglycerin for home.  Patient to call cardiology office Tuesday morning.  They will see patient this coming week.  [SS]      ED Course User Index  [SS] Yaa Cruz MD      Labs Reviewed   APTT - Abnormal; Notable for the following:        Result Value    PTT 24.1 (*)     All other components within normal limits    Narrative:     PTT = The equivalent PTT values for the therapeutic range of heparin levels at  0.1 to 0.7 U/ml are 53 to 110 seconds.   CBC WITH AUTO DIFFERENTIAL - Abnormal; Notable for the following:     Monocyte % 11.0 (*)     Monocytes, Absolute 1.08 (*)     Immature Grans, Absolute 0.04 (*)     All other components within normal limits   PROTIME-INR - Normal    Narrative:     Therapeutic Ranges for INR: 2.0-3.0 (PT 20-30)                              2.5-3.5 (PT 25-34)   BNP (IN-HOUSE) - Normal    Narrative:     Among patients with dyspnea, NT-proBNP is highly sensitive for the detection of acute congestive heart failure. In addition NT-proBNP of <300 pg/ml effectively rules out acute congestive heart failure with 99% negative predictive value.   TROPONIN (IN-HOUSE) - Normal    Narrative:     Troponin T Reference Ranges:  Less than 0.03 ng/mL:    Negative for AMI  0.03 to 0.09 ng/mL:      Indeterminant for AMI  Greater than 0.09 ng/mL: Positive for AMI   TROPONIN (IN-HOUSE) - Normal    Narrative:     Troponin T Reference Ranges:  Less than 0.03 ng/mL:    Negative for AMI  0.03 to 0.09 ng/mL:      Indeterminant for AMI  Greater than 0.09 ng/mL: Positive for AMI   COMPREHENSIVE METABOLIC PANEL   CBC AND DIFFERENTIAL    Narrative:     The following orders were created for panel order CBC & Differential.  Procedure                               Abnormality         Status                     ---------                               -----------         ------                     CBC Auto Differential[[id number]]        Abnormal            Final result                 Please view results for these tests on the individual orders.       My differential diagnosis for chest pain includes but is not limited to:  Muscle strain, costochondritis, myositis, pleurisy, rib fracture, intercostal neuritis, herpes zoster, tumor, myocardial infarction, coronary syndrome, unstable angina, angina, aortic dissection, mitral valve prolapse, pericarditis, palpitations, pulmonary embolus, pneumonia, pneumothorax, lung cancer, GERD,  esophagitis, esophageal spasm              MDM  Number of Diagnoses or Management Options  Precordial pain: new and requires workup     Amount and/or Complexity of Data Reviewed  Clinical lab tests: reviewed and ordered  Tests in the radiology section of CPT®: reviewed and ordered  Tests in the medicine section of CPT®: reviewed and ordered  Discuss the patient with other providers: yes (Dr. Jaci Hess)  Independent visualization of images, tracings, or specimens: yes (I independently reviewed and interpreted chest x-ray)    Risk of Complications, Morbidity, and/or Mortality  Presenting problems: moderate  Diagnostic procedures: high  Management options: low    Patient Progress  Patient progress: stable        Final diagnoses:   Precordial pain            Wojciech Bentley MD  09/01/18 030

## 2018-09-01 NOTE — DISCHARGE INSTRUCTIONS
Continue current medications.  Nitroglycerin as needed for recurrent chest pain.  Call Dr. Salguero's office Tuesday morning.  Inform them of ER visit and my conversation with bebe Hess.  They will schedule you to be seen this coming week.  Monitor and record blood pressure twice daily as discussed.  Return to ED for chest pain uncontrolled by nitroglycerin, uncontrolled hypertension, medical emergencies.

## 2018-09-01 NOTE — ED NOTES
Dr Bentley aware of 2 most recent BP's.  Will not treat at this time but continue to monitor.     Kristen Felton RN  09/01/18 0005

## 2018-09-05 ENCOUNTER — TELEPHONE (OUTPATIENT)
Dept: CARDIOLOGY | Facility: CLINIC | Age: 63
End: 2018-09-05

## 2018-09-05 NOTE — TELEPHONE ENCOUNTER
Pt called. He said that he was in the ER on Friday 8/31/18. He had a couple of high blood pressure readings that day as well. His testing came back normal.     At the store he got 157 systolic, and when he go home he rechecked and got 157/75 at home. He said he was washing dishes shortly after and got a small chest pain so he retook it again and it was 170/80. So he decided to go to the ER to be checked out.    Since being in the ER he has no had any more chest pain. His BP has been running 140's/70's in the am and 150'/80's in the pm.    He has and echo appt next week on 9/10/18 and an appt with Vania on 10/1/18    Would you like to make any changes in the mean time? Please advise. He can be reached at #[phone].    Thanks,  Sharda

## 2018-09-06 DIAGNOSIS — I71.20 THORACIC AORTIC ANEURYSM WITHOUT RUPTURE (HCC): Primary | ICD-10-CM

## 2018-09-06 DIAGNOSIS — R07.2 PRECORDIAL PAIN: ICD-10-CM

## 2018-09-07 ENCOUNTER — HOSPITAL ENCOUNTER (OUTPATIENT)
Dept: CT IMAGING | Facility: HOSPITAL | Age: 63
Discharge: HOME | End: 2018-09-07
Attending: INTERNAL MEDICINE | Admitting: INTERNAL MEDICINE

## 2018-09-07 LAB — CREAT BLDA-MCNC: 1.1 MG/DL (ref 0.6–1.3)

## 2018-09-07 PROCEDURE — 0 IOPAMIDOL PER 1 ML: Performed by: INTERNAL MEDICINE

## 2018-09-07 PROCEDURE — 71275 CT ANGIOGRAPHY CHEST: CPT

## 2018-09-07 PROCEDURE — 82565 ASSAY OF CREATININE: CPT

## 2018-09-07 RX ORDER — LISINOPRIL 10 MG/1
10 TABLET ORAL DAILY
Qty: 90 TABLET | Refills: 1 | Status: SHIPPED | OUTPATIENT
Start: 2018-09-07 | End: 2019-03-03 | Stop reason: SDUPTHER

## 2018-09-07 RX ADMIN — IOPAMIDOL 95 ML: 755 INJECTION, SOLUTION INTRAVENOUS at 13:20

## 2018-09-07 NOTE — NURSING NOTE
Dr. Esqueda called re:  Hold and call results were 4 cm ascending aortic aneurysm similar to 2015 exam, the rest of the aorta is fine, lungs are clear, no PE.  These results were called to Dr. Berman and she stated the patient may leave and to follow up with Dr. Berman re: his B/P.

## 2018-09-07 NOTE — TELEPHONE ENCOUNTER
Have him increase lisinopril to 10 mg a day and check a CT angiogram of his chest tomorrow (hold them call.  Order has been placed. padilla

## 2018-09-07 NOTE — TELEPHONE ENCOUNTER
Spoke with pt he is aware of medication adjustment. He asked for this to be sent in to pharmacy   This has been done    Lynette he has asked if the CT could be scheduled at  [facility]

## 2018-09-10 ENCOUNTER — HOSPITAL ENCOUNTER (OUTPATIENT)
Dept: CARDIOLOGY | Facility: HOSPITAL | Age: 63
Discharge: HOME | End: 2018-09-10
Attending: INTERNAL MEDICINE | Admitting: INTERNAL MEDICINE

## 2018-09-10 VITALS
HEIGHT: 67 IN | HEART RATE: 48 BPM | SYSTOLIC BLOOD PRESSURE: 150 MMHG | DIASTOLIC BLOOD PRESSURE: 80 MMHG | WEIGHT: 176 LBS | BODY MASS INDEX: 27.62 KG/M2

## 2018-09-10 DIAGNOSIS — I71.20 THORACIC AORTIC ANEURYSM WITHOUT RUPTURE (HCC): ICD-10-CM

## 2018-09-10 LAB
ASCENDING AORTA: 3.6 CM
BH CV ECHO MEAS - ACS: 1.6 CM
BH CV ECHO MEAS - AO MAX PG (FULL): 17 MMHG
BH CV ECHO MEAS - AO MAX PG: 20 MMHG
BH CV ECHO MEAS - AO MEAN PG (FULL): 8.2 MMHG
BH CV ECHO MEAS - AO MEAN PG: 10 MMHG
BH CV ECHO MEAS - AO ROOT AREA (BSA CORRECTED): 1.9
BH CV ECHO MEAS - AO ROOT AREA: 10.5 CM^2
BH CV ECHO MEAS - AO ROOT DIAM: 3.7 CM
BH CV ECHO MEAS - AO V2 MAX: 227 CM/SEC
BH CV ECHO MEAS - AO V2 MEAN: 150.9 CM/SEC
BH CV ECHO MEAS - AO V2 VTI: 61.6 CM
BH CV ECHO MEAS - AVA(I,A): 1.2 CM^2
BH CV ECHO MEAS - AVA(I,D): 1.2 CM^2
BH CV ECHO MEAS - AVA(V,A): 1.3 CM^2
BH CV ECHO MEAS - AVA(V,D): 1.3 CM^2
BH CV ECHO MEAS - BSA(HAYCOCK): 2 M^2
BH CV ECHO MEAS - BSA: 1.9 M^2
BH CV ECHO MEAS - BZI_BMI: 27.6 KILOGRAMS/M^2
BH CV ECHO MEAS - BZI_METRIC_HEIGHT: 170.2 CM
BH CV ECHO MEAS - BZI_METRIC_WEIGHT: 79.8 KG
BH CV ECHO MEAS - EDV(MOD-SP2): 48 ML
BH CV ECHO MEAS - EDV(MOD-SP4): 81 ML
BH CV ECHO MEAS - EDV(TEICH): 130.9 ML
BH CV ECHO MEAS - EF(CUBED): 67 %
BH CV ECHO MEAS - EF(MOD-BP): 65 %
BH CV ECHO MEAS - EF(MOD-SP2): 62.5 %
BH CV ECHO MEAS - EF(MOD-SP4): 67.9 %
BH CV ECHO MEAS - EF(TEICH): 58.1 %
BH CV ECHO MEAS - ESV(MOD-SP2): 18 ML
BH CV ECHO MEAS - ESV(MOD-SP4): 26 ML
BH CV ECHO MEAS - ESV(TEICH): 54.8 ML
BH CV ECHO MEAS - FS: 30.9 %
BH CV ECHO MEAS - IVS/LVPW: 1.4
BH CV ECHO MEAS - IVSD: 0.92 CM
BH CV ECHO MEAS - LAT PEAK E' VEL: 9 CM/SEC
BH CV ECHO MEAS - LV DIASTOLIC VOL/BSA (35-75): 42.3 ML/M^2
BH CV ECHO MEAS - LV MASS(C)D: 143.5 GRAMS
BH CV ECHO MEAS - LV MASS(C)DI: 74.9 GRAMS/M^2
BH CV ECHO MEAS - LV MAX PG: 3.6 MMHG
BH CV ECHO MEAS - LV MEAN PG: 2.1 MMHG
BH CV ECHO MEAS - LV SYSTOLIC VOL/BSA (12-30): 13.6 ML/M^2
BH CV ECHO MEAS - LV V1 MAX: 95.1 CM/SEC
BH CV ECHO MEAS - LV V1 MEAN: 67.8 CM/SEC
BH CV ECHO MEAS - LV V1 VTI: 24.3 CM
BH CV ECHO MEAS - LVIDD: 5.2 CM
BH CV ECHO MEAS - LVIDS: 3.6 CM
BH CV ECHO MEAS - LVLD AP2: 8 CM
BH CV ECHO MEAS - LVLD AP4: 8 CM
BH CV ECHO MEAS - LVLS AP2: 6.3 CM
BH CV ECHO MEAS - LVLS AP4: 6.6 CM
BH CV ECHO MEAS - LVOT AREA (M): 3.1 CM^2
BH CV ECHO MEAS - LVOT AREA: 3.1 CM^2
BH CV ECHO MEAS - LVOT DIAM: 2 CM
BH CV ECHO MEAS - LVPWD: 0.65 CM
BH CV ECHO MEAS - MED PEAK E' VEL: 7 CM/SEC
BH CV ECHO MEAS - MR MAX PG: 53 MMHG
BH CV ECHO MEAS - MR MAX VEL: 363.9 CM/SEC
BH CV ECHO MEAS - MV A DUR: 0.17 SEC
BH CV ECHO MEAS - MV A MAX VEL: 75.2 CM/SEC
BH CV ECHO MEAS - MV DEC SLOPE: 415.6 CM/SEC^2
BH CV ECHO MEAS - MV DEC TIME: 0.2 SEC
BH CV ECHO MEAS - MV E MAX VEL: 87.3 CM/SEC
BH CV ECHO MEAS - MV E/A: 1.2
BH CV ECHO MEAS - MV MAX PG: 4.6 MMHG
BH CV ECHO MEAS - MV MEAN PG: 1.4 MMHG
BH CV ECHO MEAS - MV P1/2T MAX VEL: 85.4 CM/SEC
BH CV ECHO MEAS - MV P1/2T: 60.2 MSEC
BH CV ECHO MEAS - MV V2 MAX: 106.7 CM/SEC
BH CV ECHO MEAS - MV V2 MEAN: 52.6 CM/SEC
BH CV ECHO MEAS - MV V2 VTI: 42.1 CM
BH CV ECHO MEAS - MVA P1/2T LCG: 2.6 CM^2
BH CV ECHO MEAS - MVA(P1/2T): 3.7 CM^2
BH CV ECHO MEAS - MVA(VTI): 1.8 CM^2
BH CV ECHO MEAS - PA ACC TIME: 0.12 SEC
BH CV ECHO MEAS - PA MAX PG (FULL): 3 MMHG
BH CV ECHO MEAS - PA MAX PG: 4 MMHG
BH CV ECHO MEAS - PA PR(ACCEL): 26.7 MMHG
BH CV ECHO MEAS - PA V2 MAX: 100.4 CM/SEC
BH CV ECHO MEAS - PULM A REVS DUR: 0.11 SEC
BH CV ECHO MEAS - PULM A REVS VEL: 28 CM/SEC
BH CV ECHO MEAS - PULM DIAS VEL: 55.1 CM/SEC
BH CV ECHO MEAS - PULM S/D: 1.1
BH CV ECHO MEAS - PULM SYS VEL: 60.6 CM/SEC
BH CV ECHO MEAS - PVA(V,A): 1.3 CM^2
BH CV ECHO MEAS - PVA(V,D): 1.3 CM^2
BH CV ECHO MEAS - QP/QS: 0.44
BH CV ECHO MEAS - RAP SYSTOLE: 3 MMHG
BH CV ECHO MEAS - RV MAX PG: 1 MMHG
BH CV ECHO MEAS - RV MEAN PG: 0.57 MMHG
BH CV ECHO MEAS - RV V1 MAX: 50.4 CM/SEC
BH CV ECHO MEAS - RV V1 MEAN: 35.9 CM/SEC
BH CV ECHO MEAS - RV V1 VTI: 12.4 CM
BH CV ECHO MEAS - RVOT AREA: 2.7 CM^2
BH CV ECHO MEAS - RVOT DIAM: 1.8 CM
BH CV ECHO MEAS - RVSP: 38 MMHG
BH CV ECHO MEAS - SI(AO): 338.9 ML/M^2
BH CV ECHO MEAS - SI(CUBED): 49.9 ML/M^2
BH CV ECHO MEAS - SI(LVOT): 38.9 ML/M^2
BH CV ECHO MEAS - SI(MOD-SP2): 15.7 ML/M^2
BH CV ECHO MEAS - SI(MOD-SP4): 28.7 ML/M^2
BH CV ECHO MEAS - SI(TEICH): 39.7 ML/M^2
BH CV ECHO MEAS - SUP REN AO DIAM: 1.9 CM
BH CV ECHO MEAS - SV(AO): 649.1 ML
BH CV ECHO MEAS - SV(CUBED): 95.5 ML
BH CV ECHO MEAS - SV(LVOT): 74.4 ML
BH CV ECHO MEAS - SV(MOD-SP2): 30 ML
BH CV ECHO MEAS - SV(MOD-SP4): 55 ML
BH CV ECHO MEAS - SV(RVOT): 32.9 ML
BH CV ECHO MEAS - SV(TEICH): 76.1 ML
BH CV ECHO MEAS - TAPSE (>1.6): 2 CM2
BH CV ECHO MEAS - TR MAX VEL: 295.3 CM/SEC
BH CV ECHO MEASUREMENTS AVERAGE E/E' RATIO: 10.91
BH CV XLRA - RV BASE: 3.3 CM
BH CV XLRA - TDI S': 11 CM/SEC
LEFT ATRIUM VOLUME INDEX: 23 ML/M2
SINUS: 3.3 CM
STJ: 2.9 CM

## 2018-09-10 PROCEDURE — 93306 TTE W/DOPPLER COMPLETE: CPT

## 2018-09-10 PROCEDURE — 93306 TTE W/DOPPLER COMPLETE: CPT | Performed by: INTERNAL MEDICINE

## 2018-09-27 RX ORDER — LISINOPRIL 2.5 MG/1
TABLET ORAL
Qty: 90 TABLET | Refills: 0 | OUTPATIENT
Start: 2018-09-27

## 2018-09-27 RX ORDER — METOPROLOL SUCCINATE 25 MG/1
TABLET, EXTENDED RELEASE ORAL
Qty: 90 TABLET | Refills: 0 | Status: SHIPPED | OUTPATIENT
Start: 2018-09-27 | End: 2019-01-04 | Stop reason: SDUPTHER

## 2018-10-01 ENCOUNTER — OFFICE VISIT (OUTPATIENT)
Dept: CARDIOLOGY | Facility: CLINIC | Age: 63
End: 2018-10-01

## 2018-10-01 VITALS
DIASTOLIC BLOOD PRESSURE: 74 MMHG | BODY MASS INDEX: 26.37 KG/M2 | SYSTOLIC BLOOD PRESSURE: 110 MMHG | WEIGHT: 168 LBS | HEART RATE: 56 BPM | HEIGHT: 67 IN

## 2018-10-01 DIAGNOSIS — I35.0 AORTIC VALVE STENOSIS, MILD: ICD-10-CM

## 2018-10-01 DIAGNOSIS — I25.10 CHRONIC CORONARY ARTERY DISEASE: Primary | ICD-10-CM

## 2018-10-01 DIAGNOSIS — I71.20 THORACIC AORTIC ANEURYSM WITHOUT RUPTURE (HCC): ICD-10-CM

## 2018-10-01 DIAGNOSIS — I10 HYPERTENSION, ESSENTIAL: ICD-10-CM

## 2018-10-01 PROCEDURE — 93000 ELECTROCARDIOGRAM COMPLETE: CPT | Performed by: NURSE PRACTITIONER

## 2018-10-01 PROCEDURE — 99214 OFFICE O/P EST MOD 30 MIN: CPT | Performed by: NURSE PRACTITIONER

## 2018-10-01 NOTE — PROGRESS NOTES
Date of Office Visit: 10/01/2018  Encounter Provider: Vania Glaser, JOHNNY, APRN  Place of Service: [facility] CARDIOLOGY  Patient Name: Chino Huber  :1955      Subjective:     Chief Complaint:  Follow-up blood pressure, coronary artery disease, dilated aortic root, history of chest pain.    History of Present Illness:  Chino Huber is a 62 y.o. male patient of Dr. Berman.  This is my first time seeing this patient in the office and I have reviewed his records.    Patient has a history of prostate cancer postresection, hypertension, hyperlipidemia, coronary artery disease.    In 2014 patient was noted to have coronary artery disease when he presented with chest pain and abnormal stress test.  Cardiac catheterization revealed 90% stenosis of the circumflex artery and of the right coronary artery.  Left anterior descending and the left main were normal.  Patient underwent drug-eluting stent placement to both the circumflex in the right coronary artery.  Patient had a CT angiogram of his chest May 2015 that showed stable thoracic aneurysm measuring 4.0 cm.  Patient was seen 2016 with recurrent chest pain.  Echocardiogram revealed ejection fraction of 67% with bicuspid aortic valve with mild aortic valve stenosis.  No significant regurgitation of the aortic valve was seen.  The aortic root was borderline dilated.  Patient had a stress perfusion study that was negative for ischemia.    Patient was last seen in office by Dr. Berman 17.  At this point it was noted that he denied any chest pain, shortness of breath, palpitations, syncope, near-syncope.  He reported that his blood pressures in the morning was slightly elevated with systolics in the 130s though it would fall down to the 110s to 120s by midday.  Patient was walking 30 minutes on a treadmill and walking about 2 miles/ 9500 steps a mouth daily.  Patient was instructed to follow-up in 1 year or sooner if  needed.    Patient presented to the ER 9/1/18 due to chest pain.  It was reported that around 1045 that evening he had been drying dishes when he developed sudden onset of left-sided chest pain that was sharp, nonradiating, associated with nausea but no vomiting, associated with mild diaphoresis.  No shortness of breath.  Pain level is 3 out of 10 and it lasted approximately 5 minutes.  The pain resolved on its own.  Patient's troponins were negative.  Chest x-ray within normal limits.  Patient remained pain-free in the ER.  He was instructed to follow-up with cardiology outpatient.    Patient called the office and inform them of the ER visit.  Patient's lisinopril was increased to 10 mg a day.  CT angiogram of chest was ordered.  Echocardiogram wasn't ordered as well.  Patient was instructed to schedule follow-up appointment.    Patient had CT angiogram chest done 9/7/18 showing stable mild dilation of the ascending thoracic aorta at 4.1 cm.    Patient had echocardiogram done 9/10/18 showing normal left ventricular systolic function with calculated EF of 65%.  Left ventricular diastolic function was normal.  Left atrial cavity was mildly dilated.  Aortic valve appeared to be bicuspid with prominent calcified pseudoraphe.  There was mild aortic stenosis with a mean gradient of 10 mmHg and a peak gradient of 20 mmHg.  Calculated right ventricular systolic pressure from tricuspid regurgitation was 38 mmHg.  There is no evidence of pericardial effusion.    Patient presents to office today for follow-up appointment.  Patient reports he has been feeling well since ER discharge.  He denies any other episodes of chest pain besides the one to the ER.  He now reports that he thinks at that episode of chest pain was more anxiety related.  He reports that his blood pressure had been elevated recently and he started checking it multiple times a day which was giving him a lot of anxiety and when the systolic number reached 180  he felt very anxious and felt chest pain.  He denies any shortness of breath or shortness of breath with activities.  He continues to walk 1.5 miles every morning and denies any exertional symptoms or chest pain with this.  He denies any palpitations, racing heartbeat sensation, lower extremity edema, dizziness, syncope, near-syncope, falls.  He denies a history of snoring and he reports that he feels well rested in the morning.  Patient's blood pressure in the office today is 110/74 mmHg.  He reports that at home he has been checking it first thing in the morning before taking his lisinopril, as well as before bed and has been running around 130s over 70-80.  We discussed that we would like this below 130/80.  Patient started checking blood pressure 1-2 hours after taking his morning lisinopril.  He is currently using a wrist cuff.  We will have him schedule one to two-week blood pressure follow-up appointment.  He'll bring his blood pressure log as well as his wrist cuff with him to this appointment so we can compare wrist cuff blood pressure reading against a manual to ensure accuracy of home blood pressure cuff.    Patient will follow-up with Dr. Berman in 3-6 months.  He may not be able follow-up in 3 months due to his son getting  in the winter.  He will notify office right away if he has any more episodes of chest pain or if he has any new or worsening symptoms or other problems/concerns.      Past Medical History:   Diagnosis Date   • Acute sinusitis    • Aortic root dilatation (CMS/HCC)    • Aortic stenosis    • Arteriosclerotic coronary artery disease    • Bicuspid aortic valve    • CAD (coronary artery disease)    • Chest pain    • Cough    • Dilated aortic root (CMS/HCC)    • Folliculitis    • GERD (gastroesophageal reflux disease)    • Heartburn    • Hyperlipidemia    • Hypertension    • Nonspecific abnormal findings on radiological and examination of intrathoracic organs    • Precordial pain    •  Reflux esophagitis    • Sore throat    • Thoracic aortic aneurysm without rupture (CMS/HCC)    • Throat pain      Past Surgical History:   Procedure Laterality Date   • COLONOSCOPY     • COLONOSCOPY     • COLONOSCOPY N/A 2/3/2017    Procedure: COLONOSCOPY TO CECUM WITH HOT SNARE POLYPECTOMY WITH NORMAL SALINE INJECTION AND  TWO RESOLUTION CLIPS;  Surgeon: Fam LILLY MD;  Location: [facility] ENDOSCOPY;  Service:    • CORONARY ANGIOPLASTY WITH STENT PLACEMENT     • ENDOSCOPY N/A 3/15/2017    Procedure: ESOPHAGOGASTRODUODENOSCOPY WITH BIOPSIES (COLD);  Surgeon: Fam LILLY MD;  Location: [facility] ENDOSCOPY;  Service:    • PROSTATE SURGERY     • PROSTATE SURGERY     • RHINOPLASTY     • RHINOPLASTY       Outpatient Medications Prior to Visit   Medication Sig Dispense Refill   • aspirin 325 MG tablet Take 325 mg by mouth daily.     • atorvastatin (LIPITOR) 80 MG tablet TAKE 1 TABLET DAILY 90 tablet 3   • cetirizine (zyrTEC) 10 MG tablet Take 10 mg by mouth Daily.     • esomeprazole (nexIUM) 40 MG capsule TAKE 1 CAPSULE DAILY.      PLEASE MAKE AN APPOINTMENT WITH YOUR DOCTOR 90 capsule 3   • lisinopril (PRINIVIL,ZESTRIL) 10 MG tablet Take 1 tablet by mouth Daily. 90 tablet 1   • metoprolol succinate XL (TOPROL-XL) 25 MG 24 hr tablet TAKE 1 TABLET DAILY 90 tablet 0   • esomeprazole (NexIUM) 40 MG capsule TAKE 1 CAPSULE DAILY.      PLEASE MAKE AN APPOINTMENT WITH YOUR DOCTOR. 90 capsule 0   • nitroglycerin (NITROSTAT) 0.3 MG SL tablet Place 1 tablet under the tongue Every 5 (Five) Minutes As Needed for Chest Pain for up to 15 doses. Take no more than 3 doses in 15 minutes. 15 tablet 0     No facility-administered medications prior to visit.        Allergies as of 10/01/2018   • (No Known Allergies)     Social History     Social History   • Marital status:      Spouse name: N/A   • Number of children: N/A   • Years of education: N/A     Occupational History   • Not on file.     Social History Main Topics  "  • Smoking status: Never Smoker   • Smokeless tobacco: Never Used      Comment: caffeine use   • Alcohol use No   • Drug use: No   • Sexual activity: Defer     Other Topics Concern   • Not on file     Social History Narrative   • No narrative on file     Family History   Problem Relation Age of Onset   • Heart disease Mother    • Hypertension Mother    • Colon cancer Mother    • Heart disease Father    • Hypertension Father    • Other Father         CABG, heart valve replacement   • Coronary artery disease Father        Review of Systems   Constitution: Negative for chills, fever, malaise/fatigue, night sweats, weight gain and weight loss.   HENT: Negative for ear pain, hearing loss, nosebleeds and sore throat.    Eyes: Negative for blurred vision, double vision, redness, vision loss in left eye, vision loss in right eye and visual disturbance.   Cardiovascular:        SEE HPI.    Respiratory: Negative for cough, hemoptysis, shortness of breath, snoring and wheezing.    Endocrine: Negative for cold intolerance and heat intolerance.   Skin: Negative for itching, rash and suspicious lesions.   Musculoskeletal: Negative for joint pain, joint swelling and myalgias.   Gastrointestinal: Negative for abdominal pain, diarrhea, hematemesis, melena, nausea and vomiting.   Genitourinary: Negative for dysuria, frequency and hematuria.        Erectile dysfunction.    Neurological: Negative for dizziness, headaches, numbness, paresthesias and seizures.   Psychiatric/Behavioral: Negative for altered mental status and depression. The patient is not nervous/anxious.           Objective:     Vitals:    10/01/18 0901   BP: 110/74   BP Location: Left arm   Pulse: 56   Weight: 76.2 kg (168 lb)   Height: 170.2 cm (67\")     Body mass index is 26.31 kg/m².      PHYSICAL EXAM:  Physical Exam   Constitutional: He is oriented to person, place, and time. He appears well-developed and well-nourished. No distress.   HENT:   Head: Normocephalic " and atraumatic.   Eyes: Pupils are equal, round, and reactive to light. No scleral icterus.   Neck: Neck supple. No JVD present. Carotid bruit is not present. No tracheal deviation present.   Cardiovascular: Normal rate, regular rhythm and intact distal pulses.  Exam reveals no gallop and no friction rub.    Murmur (Grade II/VI, RUSB. ) heard.  Pulses:       Radial pulses are 2+ on the right side, and 2+ on the left side.        Posterior tibial pulses are 2+ on the right side, and 2+ on the left side.   Pulmonary/Chest: Effort normal and breath sounds normal. No respiratory distress. He has no wheezes. He has no rales.   Abdominal: Soft. Bowel sounds are normal. He exhibits no distension. There is no tenderness. There is no rebound and no guarding.   Musculoskeletal: He exhibits no edema, tenderness or deformity.   Neurological: He is alert and oriented to person, place, and time.   Skin: Skin is warm and dry. No rash noted. He is not diaphoretic. No erythema.   Psychiatric: He has a normal mood and affect. His behavior is normal. Judgment normal.         ECG 12 Lead  Date/Time: 10/1/2018 9:30 AM  Performed by: SEBASTIAN CERDA  Authorized by: SEBASTIAN CERDA   Comparison: compared with previous ECG   Rhythm: sinus rhythm  Rate: bradycardic  BPM: 56  Clinical impression: non-specific ECG            Assessment:       Diagnosis Plan   1. Chronic coronary artery disease     2. Thoracic aortic aneurysm without rupture (CMS/HCC)     3. Aortic valve stenosis, mild     4. Hypertension, essential         Plan:     1. Coronary artery disease: Patient on aspirin, atorvastatin 80 mg daily, metoprolol.  Patient denies any chest pain since that one episode, which now he thinks may have been more anxiety related.    Coronary Artery Disease  Assessment  • The patient has no angina    Plan  • Lifestyle modifications discussed include adhering to a heart healthy diet, maintenance of a healthy weight, medication compliance,  regular exercise and regular monitoring of cholesterol and blood pressure    Subjective - Objective  • There has been a previous stent procedure using JONAS  • Current antiplatelet therapy includes aspirin 325 mg      2. Borderline dilated aorta: Patient had CT angiogram chest done 9/7/18 showing stable mild dilation of the ascending thoracic aorta at 4.1 cm.  3. Mild aortic valve stenosis: Patient denies any shortness of breath or shortness of breath with exertion.  Patient clinically stable in office today.  Patient to notify office if he develops any shortness of breath, shortness of breath with exertion, recurrent chest pain, or other problems/concerns.  4. Hypertension: Patient on lisinopril 10 mg daily.  Patient's blood pressure in office today is 110/74 mmHg.  Patient also remains on metoprolol.  Patient reports blood pressure outside the office has been staying around 130/70-80.  He has been checking this first thing in the morning and before bed.  Ideally due to patient's history of dilated aortic root we would like blood pressure lower than this.  Patient will start checking blood pressure in the morning one to 2 hours after taking his lisinopril (he takes metoprolol in PM).  He currently uses a wrist cuff.  He will schedule 1 to 2 weeks blood pressure check.  At this time he will bring his blood pressure log as well as his wrist cuff with him so that we can check his wrist cuff blood pressure reading against a manual to ensure that his home cuff is accurate.      Follow-up with Dr. Berman in 3-6 months or follow-up sooner if needed for any new or worsening symptoms or other problems/concerns.    Schedule a blood pressure check appointment for one to 2 weeks and bring her blood pressure log as well as her blood pressure cuff with you to this appointment.           Your medication list          Accurate as of 10/1/18  9:47 AM. If you have any questions, ask your nurse or doctor.               CHANGE how you take  these medications      Instructions Last Dose Given Next Dose Due   esomeprazole 40 MG capsule  Commonly known as:  nexIUM  What changed:  Another medication with the same name was removed. Continue taking this medication, and follow the directions you see here.  Changed by:  Vania Glaser DNP, GARY      TAKE 1 CAPSULE DAILY.      PLEASE MAKE AN APPOINTMENT WITH YOUR DOCTOR          CONTINUE taking these medications      Instructions Last Dose Given Next Dose Due   aspirin 325 MG tablet      Take 325 mg by mouth daily.       atorvastatin 80 MG tablet  Commonly known as:  LIPITOR      TAKE 1 TABLET DAILY       cetirizine 10 MG tablet  Commonly known as:  zyrTEC      Take 10 mg by mouth Daily.       lisinopril 10 MG tablet  Commonly known as:  PRINIVIL,ZESTRIL      Take 1 tablet by mouth Daily.       metoprolol succinate XL 25 MG 24 hr tablet  Commonly known as:  TOPROL-XL      TAKE 1 TABLET DAILY          STOP taking these medications    nitroglycerin 0.3 MG SL tablet  Commonly known as:  NITROSTAT  Stopped by:  Vania Glaser DNP, APRN           I did not stop or change the above medications.  Patient's medication list was updated to reflect medications he is currently taking including medication changes made by other providers.         Vania Glaser DNP, GARY  10/01/2018       Dictated utilizing Dragon dictation

## 2018-10-04 ENCOUNTER — TELEPHONE (OUTPATIENT)
Dept: CARDIOLOGY | Facility: CLINIC | Age: 63
End: 2018-10-04

## 2018-10-04 NOTE — TELEPHONE ENCOUNTER
Called patient regarding echo but it appears that this was artery reviewed at the recent visit. padilla

## 2018-10-17 ENCOUNTER — CLINICAL SUPPORT (OUTPATIENT)
Dept: CARDIOLOGY | Facility: CLINIC | Age: 63
End: 2018-10-17

## 2018-10-17 VITALS — SYSTOLIC BLOOD PRESSURE: 138 MMHG | HEART RATE: 47 BPM | DIASTOLIC BLOOD PRESSURE: 82 MMHG

## 2018-10-17 DIAGNOSIS — Z01.30 BLOOD PRESSURE CHECK: Primary | ICD-10-CM

## 2018-10-17 NOTE — PROGRESS NOTES
Patient presented to the office to monitor blood pressure and check his home monitor. Patient vitals have been recorded.     Manual  /82  HR 47    Home Monitor  /84  HR 47    BP List      Any changes, please advise.

## 2018-10-30 ENCOUNTER — OFFICE VISIT (OUTPATIENT)
Dept: INTERNAL MEDICINE | Facility: CLINIC | Age: 63
End: 2018-10-30

## 2018-10-30 VITALS
RESPIRATION RATE: 16 BRPM | BODY MASS INDEX: 26.68 KG/M2 | HEART RATE: 48 BPM | WEIGHT: 170 LBS | TEMPERATURE: 98.1 F | DIASTOLIC BLOOD PRESSURE: 78 MMHG | HEIGHT: 67 IN | SYSTOLIC BLOOD PRESSURE: 118 MMHG | OXYGEN SATURATION: 100 %

## 2018-10-30 DIAGNOSIS — E78.2 MIXED HYPERLIPIDEMIA: ICD-10-CM

## 2018-10-30 DIAGNOSIS — I25.10 CAD, MULTIPLE VESSEL: ICD-10-CM

## 2018-10-30 DIAGNOSIS — I10 HYPERTENSION, ESSENTIAL: Primary | ICD-10-CM

## 2018-10-30 DIAGNOSIS — K21.9 GASTROESOPHAGEAL REFLUX DISEASE WITHOUT ESOPHAGITIS: ICD-10-CM

## 2018-10-30 DIAGNOSIS — Z12.5 SCREENING FOR PROSTATE CANCER: ICD-10-CM

## 2018-10-30 DIAGNOSIS — H93.13 TINNITUS OF BOTH EARS: ICD-10-CM

## 2018-10-30 PROCEDURE — 99214 OFFICE O/P EST MOD 30 MIN: CPT | Performed by: NURSE PRACTITIONER

## 2018-10-30 RX ORDER — ACETAMINOPHEN 325 MG/1
650 TABLET ORAL EVERY 6 HOURS PRN
COMMUNITY
End: 2019-04-30

## 2018-10-30 RX ORDER — ASPIRIN 325 MG
325 TABLET, DELAYED RELEASE (ENTERIC COATED) ORAL DAILY
Qty: 30 TABLET | Refills: 0
Start: 2018-10-30 | End: 2020-05-20

## 2018-10-30 NOTE — PROGRESS NOTES
Subjective      CC: establish care for CAD, hyperlipidemia, GERD    Chino Huber is a 63 y.o. male. He is here to establish, former patient of Dr. Salcedo. He is  with 2 adult children. He is an , retired, but still flys recreationally. He has a health history of CAD, aortic bicuspid valve, Hyperlipidemia, and GERD.  He is followed by Dr. Kisha Berman yearly. He checks his BP at home and it runs 120-130/65-72. He takes both lisinopril and Toprol XL for BP control. He previously had a cough with lisinopril, but it has resolved at a lower dose. He is on an Aspirin daily for CAD risk reduction. He has 2 cardiac stents placed in 2013.     He has a history of Prostate cancer. Diagnosis in 2009, followed by a prostatectomy. Dr Martinez sees him yearly.      He walks 2 miles daily, but does no other exercise. He eats a low fat diet.     He has tinnitus for several years. Worse at night or quiet environments. 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      Current Outpatient Prescriptions on File Prior to Visit   Medication Sig Dispense Refill   • atorvastatin (LIPITOR) 80 MG tablet TAKE 1 TABLET DAILY 90 tablet 3   • cetirizine (zyrTEC) 10 MG tablet Take 10 mg by mouth Daily.     • esomeprazole (nexIUM) 40 MG capsule TAKE 1 CAPSULE DAILY.      PLEASE MAKE AN APPOINTMENT WITH YOUR DOCTOR 90 capsule 3   • lisinopril (PRINIVIL,ZESTRIL) 10 MG tablet Take 1 tablet by mouth Daily. 90 tablet 1   • metoprolol succinate XL (TOPROL-XL) 25 MG 24 hr tablet TAKE 1 TABLET DAILY 90 tablet 0   • [DISCONTINUED] aspirin 325 MG tablet Take 325 mg by mouth daily.       No current facility-administered medications on file prior to visit.      History of Present Illness     The following portions of the patient's history were reviewed and updated as appropriate: allergies, current medications, past family history, past medical history, past social history, past surgical history and problem  list.    Review of Systems   Constitutional: Negative.  Negative for fatigue and fever.   HENT: Positive for hearing loss (tinnitus).    Eyes: Negative.    Respiratory: Negative.  Negative for shortness of breath, wheezing and stridor.    Cardiovascular: Negative for chest pain, palpitations and leg swelling.   Gastrointestinal: Negative.  Negative for abdominal distention and abdominal pain.   Endocrine: Negative.    Genitourinary: Negative.  Negative for difficulty urinating, discharge and hematuria.   Musculoskeletal: Negative.  Negative for arthralgias, back pain, gait problem and joint swelling.   Skin: Negative.  Negative for color change and pallor.   Allergic/Immunologic: Negative.    Neurological: Negative.    Hematological: Negative.    Psychiatric/Behavioral: Negative.        Objective   Physical Exam   Constitutional: He is oriented to person, place, and time. He appears well-developed and well-nourished. No distress.   HENT:   Head: Normocephalic.   Right Ear: External ear normal. Decreased hearing is noted.   Left Ear: External ear normal. Decreased hearing is noted.   Nose: Nose normal.   Mouth/Throat: Oropharynx is clear and moist. No oropharyngeal exudate.   Neck: Neck supple.   Cardiovascular: Normal rate, regular rhythm and normal pulses.  Exam reveals no decreased pulses.    Murmur heard.   Systolic murmur is present with a grade of 3/6    Diastolic murmur is present with a grade of 3/6   Grade 3 systolic murmur heard best in aortic area   Pulmonary/Chest: Effort normal and breath sounds normal. No respiratory distress. He has no wheezes.   Abdominal: Soft. Bowel sounds are normal. He exhibits no distension. There is no tenderness. There is no guarding.   Musculoskeletal: He exhibits no edema.   Neurological: He is alert and oriented to person, place, and time.   Skin: Skin is warm and dry.   Psychiatric: He has a normal mood and affect. His behavior is normal. Judgment and thought content  normal.   Vitals reviewed.      Assessment/Plan   Chino was seen today for establish care.    Diagnoses and all orders for this visit:    Hypertension, essential  -     Comprehensive metabolic panel  -     Conv Lipid Panel w/ Chol/HDL Ratio  -     CBC & Differential  -     aspirin  MG tablet; Take 1 tablet by mouth Daily.    Mixed hyperlipidemia  -     Comprehensive metabolic panel  -     Conv Lipid Panel w/ Chol/HDL Ratio  -     CBC & Differential    Gastroesophageal reflux disease without esophagitis  -     CBC & Differential    Screening for prostate cancer  -     PSA SCREENING    CAD, multiple vessel  -     aspirin  MG tablet; Take 1 tablet by mouth Daily.    Tinnitus of both ears  -     Ambulatory Referral to ENT (Otolaryngology)    Reviewed records form Dr. Salcedo, Juan, Rose, and Cullen. He will keep upcoming specialty appointments yearly.    Will get an ENT eval for hearing loss. Discussed risk of Aspirin therapy. He had bleeding from previous antiplatelet medication after stent placement.     Colonoscopy due every 3 years due to FH and personal colon polyps.    Renew current medications as prescribed. Will stratify his CAD risk using a lipid level with an LDL goal < 70. May consider changing statin therapy.     Will set up a CPE with labs prior to appointment.

## 2018-10-31 LAB
ALBUMIN SERPL-MCNC: 4.4 G/DL (ref 3.5–5.2)
ALBUMIN/GLOB SERPL: 2.4 G/DL
ALP SERPL-CCNC: 115 U/L (ref 40–129)
ALT SERPL-CCNC: 30 U/L (ref 5–41)
AST SERPL-CCNC: 16 U/L (ref 5–40)
BASOPHILS # BLD AUTO: 0.04 10*3/MM3 (ref 0–0.2)
BASOPHILS NFR BLD AUTO: 0.6 % (ref 0–2)
BILIRUB SERPL-MCNC: 0.4 MG/DL (ref 0.2–1.2)
BUN SERPL-MCNC: 13 MG/DL (ref 8–23)
BUN/CREAT SERPL: 14 (ref 7–25)
CALCIUM SERPL-MCNC: 9.2 MG/DL (ref 8.8–10.5)
CHLORIDE SERPL-SCNC: 103 MMOL/L (ref 98–107)
CHOLEST SERPL-MCNC: 123 MG/DL (ref 0–200)
CHOLEST/HDLC SERPL: 2.93 {RATIO}
CO2 SERPL-SCNC: 30.8 MMOL/L (ref 22–29)
CREAT SERPL-MCNC: 0.93 MG/DL (ref 0.76–1.27)
EOSINOPHIL # BLD AUTO: 0.09 10*3/MM3 (ref 0.1–0.3)
EOSINOPHIL NFR BLD AUTO: 1.4 % (ref 0–4)
ERYTHROCYTE [DISTWIDTH] IN BLOOD BY AUTOMATED COUNT: 12.9 % (ref 11.5–14.5)
GLOBULIN SER CALC-MCNC: 1.8 GM/DL
GLUCOSE SERPL-MCNC: 88 MG/DL (ref 65–99)
HCT VFR BLD AUTO: 49.5 % (ref 42–52)
HDLC SERPL-MCNC: 42 MG/DL (ref 40–60)
HGB BLD-MCNC: 16.2 G/DL (ref 14–18)
IMM GRANULOCYTES # BLD: 0.03 10*3/MM3 (ref 0–0.03)
IMM GRANULOCYTES NFR BLD: 0.5 % (ref 0–0.5)
LDLC SERPL CALC-MCNC: 59 MG/DL (ref 0–100)
LYMPHOCYTES # BLD AUTO: 2.09 10*3/MM3 (ref 0.6–4.8)
LYMPHOCYTES NFR BLD AUTO: 31.4 % (ref 20–45)
MCH RBC QN AUTO: 29.3 PG (ref 27–31)
MCHC RBC AUTO-ENTMCNC: 32.7 G/DL (ref 31–37)
MCV RBC AUTO: 89.7 FL (ref 80–94)
MONOCYTES # BLD AUTO: 0.54 10*3/MM3 (ref 0–1)
MONOCYTES NFR BLD AUTO: 8.1 % (ref 3–8)
NEUTROPHILS # BLD AUTO: 3.87 10*3/MM3 (ref 1.5–8.3)
NEUTROPHILS NFR BLD AUTO: 58 % (ref 45–70)
NRBC BLD AUTO-RTO: 0 /100 WBC (ref 0–0)
PLATELET # BLD AUTO: 254 10*3/MM3 (ref 140–500)
POTASSIUM SERPL-SCNC: 4.9 MMOL/L (ref 3.5–5.2)
PROT SERPL-MCNC: 6.2 G/DL (ref 6–8.5)
PSA SERPL-MCNC: 0.08 NG/ML (ref 0–4)
RBC # BLD AUTO: 5.52 10*6/MM3 (ref 4.7–6.1)
SODIUM SERPL-SCNC: 143 MMOL/L (ref 136–145)
TRIGL SERPL-MCNC: 108 MG/DL (ref 0–150)
VLDLC SERPL CALC-MCNC: 21.6 MG/DL (ref 8–32)
WBC # BLD AUTO: 6.66 10*3/MM3 (ref 4.8–10.8)

## 2018-10-31 NOTE — PROGRESS NOTES
He is a little bradycardic and blood pressure slightly higher than ideal in the setting of aortic aneurysm.  With this in mind decreased Toprol 12.5 mg a day and increase lisinopril to 20 mg a day.  His blood pressure cuff is accurate and therefore have him call with additional readings in approximately 2 weeks. abelino

## 2018-12-18 RX ORDER — ESOMEPRAZOLE MAGNESIUM 40 MG/1
CAPSULE, DELAYED RELEASE ORAL
Qty: 90 CAPSULE | Refills: 3 | Status: SHIPPED | OUTPATIENT
Start: 2018-12-18 | End: 2019-11-19

## 2018-12-18 RX ORDER — ATORVASTATIN CALCIUM 80 MG/1
80 TABLET, FILM COATED ORAL DAILY
Qty: 90 TABLET | Refills: 3 | Status: SHIPPED | OUTPATIENT
Start: 2018-12-18 | End: 2020-01-13

## 2018-12-30 ENCOUNTER — TELEPHONE (OUTPATIENT)
Dept: CARDIOLOGY | Facility: CLINIC | Age: 63
End: 2018-12-30

## 2019-01-04 RX ORDER — METOPROLOL SUCCINATE 25 MG/1
12.5 TABLET, EXTENDED RELEASE ORAL DAILY
Qty: 45 TABLET | Refills: 1 | Status: SHIPPED | OUTPATIENT
Start: 2019-01-04 | End: 2020-01-30

## 2019-01-18 ENCOUNTER — TELEPHONE (OUTPATIENT)
Dept: CARDIOLOGY | Facility: CLINIC | Age: 64
End: 2019-01-18

## 2019-01-18 NOTE — TELEPHONE ENCOUNTER
BP sounds well controlled when he checked it.     I am not sure who else has called him besides MA, or if anyone received his message, as there are no other notes in here.    It looks like recommendation was made for metoprolol to be decreased to 12.5mg a day due to some mild bradycardia and lisinopril to be increased to 20mg daily after 10/17 BP check appointment.    When patient was called for updated BP & HR readings on these updated doses, it was unclear if he had not made medication changes or if chart just had not been updated with the changed dose.     Patient was asked to confirm current med dosages with MA but it looks like we never received call back.    I am not sure who he spoke to or where he left messages since we did not receive any additional calls that I can see.    I tried to call patient again today to clarify this issue and discuss in more detail but he did not answer.

## 2019-01-18 NOTE — TELEPHONE ENCOUNTER
I called and spoke with patient 1/18/19 at ~ 8:50am in detail.    He IS currently taking 12.5mg metoprolol xl  (1/2 of the 25mg tablet daily, as there is no 12.5mg metoprolol xl), as well as 10mg lisinopril daily---  Chart showed that he was taking these medications and dosages but needed to confirm it was accurate.      HR has been staying in the 50s, no more HR readings below 50.  BP 120s/70s.      It seems like one of his main complaints was that he was sent in a new script for the 12.5mg metoprolol xl daily (1/2 of 25mg tablet daily) even though he already had plenty of the 25 mg tablets that he could just cut in half.  I let him know that unfortunately there is not a great weight update pharmacy with new medication doses without ascending and a new prescription to reflect this.  He verbalized understanding.    He does not have any other questions or concerns at this time.  If he does then he will notify the office.    I apologized for the misunderstanding.  If patient ever comes in for another blood pressure check appointment he will schedule this with APRN or other provider if they have an opening for better clarification.

## 2019-01-18 NOTE — TELEPHONE ENCOUNTER
I called and spoke with patient regarding this misunderstanding 1/18/19 at around 8:50am:        Please see other telephone note (or copy of note below):

## 2019-01-18 NOTE — TELEPHONE ENCOUNTER
Regarding: Complaint  Contact: [phone]  ----- Message from Mychart, Generic sent at 1/16/2019 11:25 AM EST -----    Dr. Berman    I'm reluctantly writing this message to complain about my cardiac care and the efficiency of your office.  Since my visit in October, I have been contacted MULTIPLE TIMES by your office inquiring about the current dosages of Metoprolol and Lisinopril due to differing instructions.  It seems to me that your office should be well aware of what you've prescribed and the proper dosages.  I was contacted again today by someone inquiring as to my dosages and why I hadn't called back with my BP readings. The previous calls have been around 4:30 pm on Friday and returning calls forces me to leave a message which apparently doesn't get forwarded to the proper people.  Timeliness and accuracy should be paramount in cardiac care!!

## 2019-03-05 RX ORDER — LISINOPRIL 10 MG/1
TABLET ORAL
Qty: 90 TABLET | Refills: 1 | Status: SHIPPED | OUTPATIENT
Start: 2019-03-05 | End: 2019-07-30 | Stop reason: DRUGHIGH

## 2019-03-18 ENCOUNTER — OFFICE VISIT (OUTPATIENT)
Dept: INTERNAL MEDICINE | Facility: CLINIC | Age: 64
End: 2019-03-18

## 2019-03-18 ENCOUNTER — TELEPHONE (OUTPATIENT)
Dept: INTERNAL MEDICINE | Facility: CLINIC | Age: 64
End: 2019-03-18

## 2019-03-18 VITALS
SYSTOLIC BLOOD PRESSURE: 130 MMHG | HEART RATE: 79 BPM | DIASTOLIC BLOOD PRESSURE: 76 MMHG | RESPIRATION RATE: 16 BRPM | HEIGHT: 67 IN | TEMPERATURE: 98.6 F | WEIGHT: 168.4 LBS | BODY MASS INDEX: 26.43 KG/M2 | OXYGEN SATURATION: 96 %

## 2019-03-18 DIAGNOSIS — J01.00 ACUTE MAXILLARY SINUSITIS, RECURRENCE NOT SPECIFIED: Primary | ICD-10-CM

## 2019-03-18 DIAGNOSIS — J40 BRONCHITIS: ICD-10-CM

## 2019-03-18 PROCEDURE — 99213 OFFICE O/P EST LOW 20 MIN: CPT | Performed by: NURSE PRACTITIONER

## 2019-03-18 RX ORDER — AMOXICILLIN AND CLAVULANATE POTASSIUM 875; 125 MG/1; MG/1
1 TABLET, FILM COATED ORAL 2 TIMES DAILY
Qty: 20 TABLET | Refills: 0 | Status: SHIPPED | OUTPATIENT
Start: 2019-03-18 | End: 2019-04-30

## 2019-03-18 RX ORDER — CEFDINIR 300 MG/1
CAPSULE ORAL
COMMUNITY
Start: 2019-02-16 | End: 2019-03-18

## 2019-03-18 RX ORDER — OFLOXACIN 3 MG/ML
SOLUTION/ DROPS OPHTHALMIC
COMMUNITY
Start: 2019-02-16 | End: 2019-04-30

## 2019-03-18 RX ORDER — METOPROLOL SUCCINATE AND HYDROCHLOROTHIAZIDE 12.5; 1 MG/1; MG/1
TABLET ORAL
COMMUNITY
Start: 2018-12-10 | End: 2019-03-18

## 2019-03-18 NOTE — TELEPHONE ENCOUNTER
Contacted patient and relayed appt date/time.  Pt voiced understanding.      ----- Message from GARY Graves sent at 3/18/2019 10:11 AM EDT -----  Regarding: RE: FLU SYMPTOMS  Contact: [phone]  Will see him at 1130am    ----- Message -----  From: Elham Bardales MA  Sent: 3/18/2019   9:50 AM  To: GARY Graves  Subject: FW: FLU SYMPTOMS                                     ----- Message -----  From: Juan Jose Ho  Sent: 3/18/2019   8:35 AM  [facility] [facility]  Subject: FLU SYMPTOMS                                     [facility] PT    Patient has flu symptoms, severe cough, sore throat, aches, fever.  Wants to come today      Thanks!  juan jose

## 2019-03-18 NOTE — PROGRESS NOTES
Chief Complaint   Patient presents with   • Hypertension   • Nasal Congestion     since feb   • Headache   • Cough     clear       Subjective   Chinolynette Huber is a 63 y.o. male is being seen for an acute appointment for sinus pressure, congestion. This started 2018, while traveling to [place] for a . He went to urgent care, and was given an antibiotic, Omnicef 300 mg.  He woke up Saturday, feeling worse. Started with scratchy throat, then trouble swallowing, nasal congestion, and cough. He is on Zyrtec 10mg daily, dayquil as needed. No fever, no SOA.     History of Present Illness     Current Outpatient Medications on File Prior to Visit   Medication Sig Dispense Refill   • acetaminophen (TYLENOL) 325 MG tablet Take 650 mg by mouth Every 6 (Six) Hours As Needed for Mild Pain .     • aspirin  MG tablet Take 1 tablet by mouth Daily. 30 tablet 0   • atorvastatin (LIPITOR) 80 MG tablet Take 1 tablet by mouth Daily. 90 tablet 3   • cetirizine (zyrTEC) 10 MG tablet Take 10 mg by mouth Daily.     • esomeprazole (nexIUM) 40 MG capsule TAKE ONE PO  DAILY 90 capsule 3   • lisinopril (PRINIVIL,ZESTRIL) 10 MG tablet TAKE 1 TABLET DAILY 90 tablet 1   • metoprolol succinate XL (TOPROL-XL) 25 MG 24 hr tablet Take 0.5 tablets by mouth Daily. 45 tablet 1   • [DISCONTINUED] Metoprolol-HCTZ -12.5 MG tablet sustained-release 24 hour      • cefdinir (OMNICEF) 300 MG capsule      • ofloxacin (OCUFLOX) 0.3 % ophthalmic solution        No current facility-administered medications on file prior to visit.        The following portions of the patient's history were reviewed and updated as appropriate: allergies, current medications, past family history, past medical history, past social history, past surgical history and problem list.    Review of Systems   Constitutional: Negative.    HENT: Positive for congestion, postnasal drip, rhinorrhea and sinus pain.    Respiratory: Positive for cough. Negative for shortness of  breath and stridor.    Cardiovascular: Negative.    Gastrointestinal: Negative.    Endocrine: Negative.    Musculoskeletal: Negative.    Allergic/Immunologic: Negative.    Neurological: Negative.    Hematological: Negative.    Psychiatric/Behavioral: Negative.        Objective   Physical Exam   Constitutional: He is oriented to person, place, and time. He appears well-developed and well-nourished. No distress.   HENT:   Right Ear: Decreased hearing is noted.   Left Ear: Decreased hearing is noted.   Nose: Mucosal edema and rhinorrhea present. Right sinus exhibits frontal sinus tenderness. Left sinus exhibits frontal sinus tenderness.   Neck: Neck supple. No thyromegaly present.   Cardiovascular: Normal rate, regular rhythm and normal heart sounds.   No murmur heard.  Pulmonary/Chest: Effort normal and breath sounds normal.   Musculoskeletal: He exhibits no edema.   Neurological: He is alert and oriented to person, place, and time.   Skin: Skin is warm and dry.   Psychiatric: He has a normal mood and affect. His behavior is normal.   Vitals reviewed.      Assessment/Plan           Diagnosis Plan   1. Acute maxillary sinusitis, recurrence not specified  amoxicillin-clavulanate (AUGMENTIN) 875-125 MG per tablet   2. Bronchitis  amoxicillin-clavulanate (AUGMENTIN) 875-125 MG per tablet     Follow up as needed

## 2019-04-24 DIAGNOSIS — E78.2 MIXED HYPERLIPIDEMIA: Primary | ICD-10-CM

## 2019-04-24 DIAGNOSIS — I10 HYPERTENSION, ESSENTIAL: ICD-10-CM

## 2019-04-24 LAB
ALBUMIN SERPL-MCNC: 4.4 G/DL (ref 3.5–5.2)
ALBUMIN/GLOB SERPL: 2.2 G/DL
ALP SERPL-CCNC: 111 U/L (ref 39–117)
ALT SERPL-CCNC: 29 U/L (ref 1–41)
AST SERPL-CCNC: 19 U/L (ref 1–40)
BASOPHILS # BLD AUTO: 0.05 10*3/MM3 (ref 0–0.2)
BASOPHILS NFR BLD AUTO: 0.7 % (ref 0–1.5)
BILIRUB SERPL-MCNC: 0.4 MG/DL (ref 0.2–1.2)
BUN SERPL-MCNC: 17 MG/DL (ref 8–23)
BUN/CREAT SERPL: 15.7 (ref 7–25)
CALCIUM SERPL-MCNC: 9.8 MG/DL (ref 8.6–10.5)
CHLORIDE SERPL-SCNC: 102 MMOL/L (ref 98–107)
CHOLEST SERPL-MCNC: 118 MG/DL (ref 0–200)
CHOLEST/HDLC SERPL: 3.19 {RATIO}
CO2 SERPL-SCNC: 28.9 MMOL/L (ref 22–29)
CREAT SERPL-MCNC: 1.08 MG/DL (ref 0.76–1.27)
EOSINOPHIL # BLD AUTO: 0.1 10*3/MM3 (ref 0–0.4)
EOSINOPHIL NFR BLD AUTO: 1.5 % (ref 0.3–6.2)
ERYTHROCYTE [DISTWIDTH] IN BLOOD BY AUTOMATED COUNT: 13.2 % (ref 12.3–15.4)
GLOBULIN SER CALC-MCNC: 2 GM/DL
GLUCOSE SERPL-MCNC: 99 MG/DL (ref 65–99)
HCT VFR BLD AUTO: 48.9 % (ref 37.5–51)
HDLC SERPL-MCNC: 37 MG/DL (ref 40–60)
HGB BLD-MCNC: 15.7 G/DL (ref 13–17.7)
IMM GRANULOCYTES # BLD AUTO: 0.02 10*3/MM3 (ref 0–0.05)
IMM GRANULOCYTES NFR BLD AUTO: 0.3 % (ref 0–0.5)
LDLC SERPL CALC-MCNC: 59 MG/DL (ref 0–100)
LYMPHOCYTES # BLD AUTO: 2.05 10*3/MM3 (ref 0.7–3.1)
LYMPHOCYTES NFR BLD AUTO: 30.5 % (ref 19.6–45.3)
MCH RBC QN AUTO: 29.2 PG (ref 26.6–33)
MCHC RBC AUTO-ENTMCNC: 32.1 G/DL (ref 31.5–35.7)
MCV RBC AUTO: 91.1 FL (ref 79–97)
MONOCYTES # BLD AUTO: 0.65 10*3/MM3 (ref 0.1–0.9)
MONOCYTES NFR BLD AUTO: 9.7 % (ref 5–12)
NEUTROPHILS # BLD AUTO: 3.86 10*3/MM3 (ref 1.7–7)
NEUTROPHILS NFR BLD AUTO: 57.3 % (ref 42.7–76)
NRBC BLD AUTO-RTO: 0 /100 WBC (ref 0–0.2)
PLATELET # BLD AUTO: 263 10*3/MM3 (ref 140–450)
POTASSIUM SERPL-SCNC: 5.1 MMOL/L (ref 3.5–5.2)
PROT SERPL-MCNC: 6.4 G/DL (ref 6–8.5)
RBC # BLD AUTO: 5.37 10*6/MM3 (ref 4.14–5.8)
SODIUM SERPL-SCNC: 142 MMOL/L (ref 136–145)
TRIGL SERPL-MCNC: 111 MG/DL (ref 0–150)
VLDLC SERPL CALC-MCNC: 22.2 MG/DL
WBC # BLD AUTO: 6.73 10*3/MM3 (ref 3.4–10.8)

## 2019-04-30 ENCOUNTER — OFFICE VISIT (OUTPATIENT)
Dept: INTERNAL MEDICINE | Facility: CLINIC | Age: 64
End: 2019-04-30

## 2019-04-30 VITALS
RESPIRATION RATE: 16 BRPM | HEART RATE: 49 BPM | DIASTOLIC BLOOD PRESSURE: 78 MMHG | HEIGHT: 67 IN | WEIGHT: 169 LBS | SYSTOLIC BLOOD PRESSURE: 124 MMHG | BODY MASS INDEX: 26.53 KG/M2 | TEMPERATURE: 98.6 F | OXYGEN SATURATION: 98 %

## 2019-04-30 DIAGNOSIS — Z23 IMMUNIZATION DUE: ICD-10-CM

## 2019-04-30 DIAGNOSIS — I10 HYPERTENSION, ESSENTIAL: Primary | ICD-10-CM

## 2019-04-30 DIAGNOSIS — E78.2 MIXED HYPERLIPIDEMIA: ICD-10-CM

## 2019-04-30 DIAGNOSIS — K21.00 GASTROESOPHAGEAL REFLUX DISEASE WITH ESOPHAGITIS: ICD-10-CM

## 2019-04-30 DIAGNOSIS — Z11.59 ENCOUNTER FOR HEPATITIS C SCREENING TEST FOR LOW RISK PATIENT: ICD-10-CM

## 2019-04-30 DIAGNOSIS — I25.10 CHRONIC CORONARY ARTERY DISEASE: ICD-10-CM

## 2019-04-30 PROBLEM — J01.00 ACUTE MAXILLARY SINUSITIS: Status: RESOLVED | Noted: 2019-03-18 | Resolved: 2019-04-30

## 2019-04-30 PROBLEM — J40 BRONCHITIS: Status: RESOLVED | Noted: 2019-03-18 | Resolved: 2019-04-30

## 2019-04-30 LAB
Lab: NORMAL
Lab: NORMAL

## 2019-04-30 PROCEDURE — 90472 IMMUNIZATION ADMIN EACH ADD: CPT | Performed by: NURSE PRACTITIONER

## 2019-04-30 PROCEDURE — 90632 HEPA VACCINE ADULT IM: CPT | Performed by: NURSE PRACTITIONER

## 2019-04-30 PROCEDURE — 90471 IMMUNIZATION ADMIN: CPT | Performed by: NURSE PRACTITIONER

## 2019-04-30 PROCEDURE — 90732 PPSV23 VACC 2 YRS+ SUBQ/IM: CPT | Performed by: NURSE PRACTITIONER

## 2019-04-30 PROCEDURE — 99214 OFFICE O/P EST MOD 30 MIN: CPT | Performed by: NURSE PRACTITIONER

## 2019-04-30 NOTE — PROGRESS NOTES
Chief Complaint   Patient presents with   • Follow-up   • Hypertension       Subjective     Chino Huber is a 63 y.o. male being seen for a follow up appointment today regarding HTN, hyperlipidemia, CAD, thoracic aneurysm. and GERD. He is currently on Toprol XL 25 mg and Aspririn 325 mg daily for CAD and history cardiac stent placement. He is followed by Dr. Berman, last visit . He has 2 drug eluding stents RCA and circumflex. He has failed antiplatelet therapy. He had a CT nik showing a stable Thoracic aneurysm.  He is taking atorvastatin 80mg nightly. He is tolerating it well. He is walking 2 miles daily on the treadmil. Denies CP, SOA. His GERD is well controlled on Nexium. He denies abd pain, reflux.       History of Present Illness     No Known Allergies      Current Outpatient Medications:   •  aspirin  MG tablet, Take 1 tablet by mouth Daily., Disp: 30 tablet, Rfl: 0  •  atorvastatin (LIPITOR) 80 MG tablet, Take 1 tablet by mouth Daily., Disp: 90 tablet, Rfl: 3  •  cetirizine (zyrTEC) 10 MG tablet, Take 10 mg by mouth Daily., Disp: , Rfl:   •  esomeprazole (nexIUM) 40 MG capsule, TAKE ONE PO  DAILY, Disp: 90 capsule, Rfl: 3  •  lisinopril (PRINIVIL,ZESTRIL) 10 MG tablet, TAKE 1 TABLET DAILY, Disp: 90 tablet, Rfl: 1  •  metoprolol succinate XL (TOPROL-XL) 25 MG 24 hr tablet, Take 0.5 tablets by mouth Daily., Disp: 45 tablet, Rfl: 1    The following portions of the patient's history were reviewed and updated as appropriate: allergies, current medications, past family history, past medical history, past social history, past surgical history and problem list.    Review of Systems   Constitutional: Negative.    HENT: Positive for congestion.    Eyes: Negative.    Respiratory: Negative.  Negative for shortness of breath, wheezing and stridor.    Cardiovascular: Negative.  Negative for chest pain, palpitations and leg swelling.   Gastrointestinal: Negative.    Endocrine: Negative.    Genitourinary:  Negative.    Musculoskeletal: Negative.  Negative for arthralgias and back pain.   Allergic/Immunologic: Positive for environmental allergies.   Neurological: Negative.    Hematological: Negative.    Psychiatric/Behavioral: Negative.        Assessment     Physical Exam   Constitutional: He is oriented to person, place, and time. He appears well-developed and well-nourished. No distress.   HENT:   Head: Normocephalic.   Right Ear: External ear normal. Decreased hearing is noted.   Left Ear: External ear normal. Decreased hearing is noted.   Eyes: Pupils are equal, round, and reactive to light.   Neck: Neck supple. No thyromegaly present.   Cardiovascular: Normal rate, regular rhythm and normal heart sounds.   No murmur heard.  Pulmonary/Chest: Effort normal and breath sounds normal. No stridor. No respiratory distress.   Musculoskeletal: He exhibits no edema.   Neurological: He is alert and oriented to person, place, and time. No cranial nerve deficit.   Skin: Skin is warm and dry.   Psychiatric: He has a normal mood and affect.   Vitals reviewed.      Plan     His fasting labs were reviewed with the patient from last week as well as Cardio records.    Chino was seen today for follow-up and hypertension.    Diagnoses and all orders for this visit:    Hypertension, essential  -     Comprehensive metabolic panel; Future  -     Conv Lipid Panel w/ Chol/HDL Ratio; Future    Mixed hyperlipidemia  -     Comprehensive metabolic panel; Future  -     Conv Lipid Panel w/ Chol/HDL Ratio; Future    Gastroesophageal reflux disease with esophagitis    Chronic coronary artery disease    Encounter for hepatitis C screening test for low risk patient  -     Hepatitis C Antibody    Immunization due  -     Hepatitis A Vaccine Adult IM  -     Pneumococcal Polysaccharide Vaccine 23-Valent (PPSV23) Greater Than or Equal To 3yo Subcutaneous / IM    Keep Appt with Dr. Berman    Follow up with a CPE in 6 months with fasting labs

## 2019-05-01 LAB
HCV AB S/CO SERPL IA: <0.1 S/CO RATIO (ref 0–0.9)
WRITTEN AUTHORIZATION: NORMAL

## 2019-05-05 ENCOUNTER — RESULTS ENCOUNTER (OUTPATIENT)
Dept: INTERNAL MEDICINE | Facility: CLINIC | Age: 64
End: 2019-05-05

## 2019-05-05 DIAGNOSIS — E78.2 MIXED HYPERLIPIDEMIA: ICD-10-CM

## 2019-05-05 DIAGNOSIS — I10 HYPERTENSION, ESSENTIAL: ICD-10-CM

## 2019-05-22 ENCOUNTER — OFFICE VISIT (OUTPATIENT)
Dept: CARDIOLOGY | Facility: CLINIC | Age: 64
End: 2019-05-22

## 2019-05-22 VITALS
DIASTOLIC BLOOD PRESSURE: 84 MMHG | WEIGHT: 169 LBS | BODY MASS INDEX: 25.61 KG/M2 | HEART RATE: 54 BPM | HEIGHT: 68 IN | SYSTOLIC BLOOD PRESSURE: 132 MMHG

## 2019-05-22 DIAGNOSIS — I25.10 CORONARY ARTERY DISEASE INVOLVING NATIVE CORONARY ARTERY OF NATIVE HEART WITHOUT ANGINA PECTORIS: Primary | ICD-10-CM

## 2019-05-22 DIAGNOSIS — Q23.1 BICUSPID AORTIC VALVE: ICD-10-CM

## 2019-05-22 DIAGNOSIS — I35.0 AORTIC VALVE STENOSIS, MILD: ICD-10-CM

## 2019-05-22 DIAGNOSIS — I10 HYPERTENSION, ESSENTIAL: ICD-10-CM

## 2019-05-22 DIAGNOSIS — I71.20 THORACIC AORTIC ANEURYSM WITHOUT RUPTURE (HCC): ICD-10-CM

## 2019-05-22 PROCEDURE — 99213 OFFICE O/P EST LOW 20 MIN: CPT | Performed by: INTERNAL MEDICINE

## 2019-05-22 PROCEDURE — 93000 ELECTROCARDIOGRAM COMPLETE: CPT | Performed by: INTERNAL MEDICINE

## 2019-05-22 NOTE — PROGRESS NOTES
Date of Office Visit: 2019  Encounter Provider: Kisha Berman MD  Place of Service: [facility] CARDIOLOGY  Patient Name: Chino Huber  :1955    Chief complaint   Follow-up of coronary artery disease, bicuspid aortic valve and dilated aortic root    History of Present Illness  The patient is a pleasant, 63year-old gentleman with history of prostate cancer status  post resection, hypertension, and hyperlipidemia who in 2014 was noted to have coronary  artery disease when he presented with chest pain and abnormal stress test. A cardiac  catheterization revealed 90% stenosis of the circumflex artery and of the right coronary  artery. The left anterior descending and left main were normal. He underwent drug-coated  stent placement to both the circumflex and the right coronary artery.  He had a CT angio of his chest in May 2015 that revealed stable thoracic aneurysm measuring 4.0 cm.  In 2018 he had an echocardiogram that showed normal systolic function with mild left atrial enlargement, bicuspid aortic valve with mild stenosis with a mean gradient of 10 mmHg and mildly dilated a sending aorta measuring 3.6 cm.  CT angiogram of the chest showed stable dilated a sending aorta measuring 4.1 cm at the ascending aortic level.    Since last visit he was laid up for 2 months with severe sinus infection and was not able to exercise.  His blood pressure at home is been in the 120s to 130s.  He denies any chest pain, shortness of breath, palpitations, syncope near syncope.  Had routine blood work with normal liver function tests.  HDL was slightly reduced.  Overall he feels quite well he is and is now returned back to walking 2 miles and a half an hour almost daily    Past Medical History:   Diagnosis Date   • Acute sinusitis    • Allergic 1969   • Aortic root dilatation (CMS/HCC)    • Aortic stenosis    • Aortic valve stenosis     mild   • Arteriosclerotic coronary artery  disease    • Bicuspid aortic valve    • CAD (coronary artery disease)    • Cancer (CMS/HCC) 2009    Prostate   • Chest pain    • Colon polyp    • Cough    • Dilated aortic root (CMS/HCC)    • ED (erectile dysfunction)    • Folliculitis    • GERD (gastroesophageal reflux disease)    • Heart murmur 2013    Aortic bicuspid   • Heartburn    • Hyperlipidemia    • Hypertension    • Nonspecific abnormal findings on radiological and examination of intrathoracic organs    • Precordial pain    • Reflux esophagitis    • Sore throat    • Thoracic aortic aneurysm without rupture (CMS/HCC)    • Throat pain      Past Surgical History:   Procedure Laterality Date   • CARDIAC CATHETERIZATION  6/2013    Stent placement   • COLONOSCOPY     • COLONOSCOPY     • COLONOSCOPY N/A 2/3/2017    Procedure: COLONOSCOPY TO CECUM WITH HOT SNARE POLYPECTOMY WITH NORMAL SALINE INJECTION AND  TWO RESOLUTION CLIPS;  Surgeon: Fam LILLY MD;  Location: [facility] ENDOSCOPY;  Service:    • CORONARY ANGIOPLASTY WITH STENT PLACEMENT     • ENDOSCOPY N/A 3/15/2017    Procedure: ESOPHAGOGASTRODUODENOSCOPY WITH BIOPSIES (COLD);  Surgeon: Fam LILLY MD;  Location: [facility] ENDOSCOPY;  Service:    • PROSTATE SURGERY     • PROSTATE SURGERY     • RHINOPLASTY     • RHINOPLASTY       Outpatient Medications Prior to Visit   Medication Sig Dispense Refill   • aspirin  MG tablet Take 1 tablet by mouth Daily. 30 tablet 0   • atorvastatin (LIPITOR) 80 MG tablet Take 1 tablet by mouth Daily. 90 tablet 3   • cetirizine (zyrTEC) 10 MG tablet Take 10 mg by mouth Daily.     • esomeprazole (nexIUM) 40 MG capsule TAKE ONE PO  DAILY 90 capsule 3   • lisinopril (PRINIVIL,ZESTRIL) 10 MG tablet TAKE 1 TABLET DAILY 90 tablet 1   • metoprolol succinate XL (TOPROL-XL) 25 MG 24 hr tablet Take 0.5 tablets by mouth Daily. 45 tablet 1     No facility-administered medications prior to visit.        Allergies as of 05/22/2019   • (No Known Allergies)     Social History      Socioeconomic History   • Marital status:      Spouse name: Not on file   • Number of children: Not on file   • Years of education: Not on file   • Highest education level: Not on file   Tobacco Use   • Smoking status: Never Smoker   • Smokeless tobacco: Never Used   • Tobacco comment: caffeine use   Substance and Sexual Activity   • Alcohol use: Yes     Alcohol/week: 0.6 oz     Types: 1 Cans of beer per week   • Drug use: No   • Sexual activity: Not Currently     Partners: Female     Birth control/protection: Surgical     Comment: Vasectomy, Prostatectomy   Social History Narrative    Pt lives with his wife. He has 2 children, ages 20 and 32. He is a retired .      Family History   Problem Relation Age of Onset   • Heart disease Mother    • Hypertension Mother    • Colon cancer Mother    • Cancer Mother         colon   • Hearing loss Mother    • Heart disease Father    • Hypertension Father    • Other Father         CABG, heart valve replacement   • Coronary artery disease Father    • Heart failure Father    • Cancer Father         prostate   • Glaucoma Father    • Cancer Sister         Breast   • Hypertension Sister    • Cancer Sister         Breast   • Hypertension Sister      Review of Systems   Constitution: Negative for fever, malaise/fatigue, weight gain and weight loss.   HENT: Positive for hearing loss. Negative for ear pain, nosebleeds and sore throat.    Eyes: Negative for double vision, pain, vision loss in left eye and vision loss in right eye.   Cardiovascular:        See history of present illness.   Respiratory: Negative for cough, shortness of breath, sleep disturbances due to breathing, snoring and wheezing.    Endocrine: Negative for cold intolerance, heat intolerance and polyuria.   Skin: Negative for itching, poor wound healing and rash.   Musculoskeletal: Negative for joint pain, joint swelling and myalgias.   Gastrointestinal: Negative for abdominal pain,  "diarrhea, hematochezia, nausea and vomiting.   Genitourinary: Negative for hematuria and hesitancy.   Neurological: Negative for numbness, paresthesias and seizures.   Psychiatric/Behavioral: Negative for depression. The patient is not nervous/anxious.         Objective:     Vitals:    05/22/19 0953   BP: 132/84   Pulse: 54   Weight: 76.7 kg (169 lb)   Height: 172.7 cm (68\")     Body mass index is 25.7 kg/m².    Physical Exam   Constitutional: He is oriented to person, place, and time. He appears well-developed and well-nourished.   HENT:   Head: Normocephalic.   Nose: Nose normal.   Mouth/Throat: Oropharynx is clear and moist.   Eyes: Conjunctivae and EOM are normal. Pupils are equal, round, and reactive to light. Right eye exhibits no discharge. No scleral icterus.   Neck: Normal range of motion. Neck supple. No JVD present. No thyromegaly present.   Cardiovascular: Normal rate, regular rhythm, normal heart sounds and intact distal pulses. Exam reveals no gallop and no friction rub.   No murmur heard.  Pulses:       Carotid pulses are 2+ on the right side, and 2+ on the left side.       Radial pulses are 2+ on the right side, and 2+ on the left side.        Femoral pulses are 2+ on the right side, and 2+ on the left side.       Popliteal pulses are 2+ on the right side, and 2+ on the left side.        Dorsalis pedis pulses are 2+ on the right side, and 2+ on the left side.        Posterior tibial pulses are 2+ on the right side, and 2+ on the left side.   Pulmonary/Chest: Effort normal and breath sounds normal. No respiratory distress. He has no wheezes. He has no rales.   Abdominal: Soft. Bowel sounds are normal. He exhibits no distension. There is no hepatosplenomegaly. There is no tenderness. There is no rebound.   Musculoskeletal: Normal range of motion. He exhibits no edema or tenderness.   Neurological: He is alert and oriented to person, place, and time.   Skin: Skin is warm and dry. No rash noted. No " erythema.   Psychiatric: He has a normal mood and affect. His behavior is normal. Judgment and thought content normal.   Vitals reviewed.    Lab Review:     ECG 12 Lead  Date/Time: 5/22/2019 9:59 AM  Performed by: Kisha Berman MD  Authorized by: Kisha Berman MD   Comparison: compared with previous ECG   Similar to previous ECG  Rhythm: sinus rhythm    Clinical impression: normal ECG          Assessment:       Diagnosis Plan   1. Coronary artery disease involving native coronary artery of native heart without angina pectoris  ECG 12 Lead   2. Thoracic aortic aneurysm without rupture (CMS/HCC)     3. Hypertension, essential     4. Bicuspid aortic valve     5. Aortic valve stenosis, mild       Plan:       1.  Coronary artery disease with history of prior stenting and negative stress test in June 2016.  No anginal symptoms.  We will continue with lifestyle modification follow-up in 6 months  2.  Mildly dilated ascending aorta, stable on CT in September 2018  3.  Mild aortic valve stenosis.  Remains mild and asymptomatic at this time.  Follow-up in 6 months  4.  Elevated liver function tests, resolved on most recent blood work  5.  GE reflux disease    Coronary Artery Disease  Assessment  • The patient has no angina    Subjective - Objective  • There has been a previous stent procedure using JONAS  • Current antiplatelet therapy includes aspirin 325 mg         Your medication list           Accurate as of 5/22/19 11:59 PM. If you have any questions, ask your nurse or doctor.               CONTINUE taking these medications      Instructions Last Dose Given Next Dose Due   aspirin  MG tablet      Take 1 tablet by mouth Daily.       atorvastatin 80 MG tablet  Commonly known as:  LIPITOR      Take 1 tablet by mouth Daily.       cetirizine 10 MG tablet  Commonly known as:  zyrTEC      Take 10 mg by mouth Daily.       esomeprazole 40 MG capsule  Commonly known as:  nexIUM      TAKE ONE PO  DAILY       lisinopril 10 MG  tablet  Commonly known as:  PRINIVIL,ZESTRIL      TAKE 1 TABLET DAILY       metoprolol succinate XL 25 MG 24 hr tablet  Commonly known as:  TOPROL-XL      Take 0.5 tablets by mouth Daily.              Dictated utilizing Dragon dictation

## 2019-07-15 ENCOUNTER — OFFICE VISIT (OUTPATIENT)
Dept: INTERNAL MEDICINE | Facility: CLINIC | Age: 64
End: 2019-07-15

## 2019-07-15 VITALS
HEART RATE: 53 BPM | WEIGHT: 168 LBS | RESPIRATION RATE: 14 BRPM | BODY MASS INDEX: 25.46 KG/M2 | OXYGEN SATURATION: 98 % | HEIGHT: 68 IN | DIASTOLIC BLOOD PRESSURE: 82 MMHG | TEMPERATURE: 98.8 F | SYSTOLIC BLOOD PRESSURE: 134 MMHG

## 2019-07-15 DIAGNOSIS — J06.9 ACUTE URI: Primary | ICD-10-CM

## 2019-07-15 PROBLEM — Z11.59 ENCOUNTER FOR HEPATITIS C SCREENING TEST FOR LOW RISK PATIENT: Status: RESOLVED | Noted: 2019-04-30 | Resolved: 2019-07-15

## 2019-07-15 PROBLEM — Z23 IMMUNIZATION DUE: Status: RESOLVED | Noted: 2019-04-30 | Resolved: 2019-07-15

## 2019-07-15 PROCEDURE — 99213 OFFICE O/P EST LOW 20 MIN: CPT | Performed by: NURSE PRACTITIONER

## 2019-07-15 RX ORDER — MOMETASONE FUROATE 50 UG/1
2 SPRAY, METERED NASAL DAILY
Qty: 17 G | Refills: 0
Start: 2019-07-15 | End: 2022-03-11 | Stop reason: ALTCHOICE

## 2019-07-15 RX ORDER — CEFDINIR 300 MG/1
300 CAPSULE ORAL 2 TIMES DAILY
Qty: 20 CAPSULE | Refills: 0 | Status: SHIPPED | OUTPATIENT
Start: 2019-07-15 | End: 2019-07-30

## 2019-07-15 NOTE — PROGRESS NOTES
Chief Complaint   Patient presents with   • Cough     all sx 2 x weeks   • Sore Throat   • Earache     cannot wear hearing aids due to earache       Subjective     Chino Huber is a 63 y.o. male being seen for an acute appointment today regarding cough and congestion.  He has a grandson that is 11 months old. Symptoms include nasal congestion, cough, sore throat, sinus pressure, right ear pain. Denies fever, SOA. He is taking Zyretc 10 mg, Dayquil, Tylenol, sudafed, and Nyquil.       History of Present Illness     No Known Allergies      Current Outpatient Medications:   •  aspirin  MG tablet, Take 1 tablet by mouth Daily., Disp: 30 tablet, Rfl: 0  •  atorvastatin (LIPITOR) 80 MG tablet, Take 1 tablet by mouth Daily., Disp: 90 tablet, Rfl: 3  •  cetirizine (zyrTEC) 10 MG tablet, Take 10 mg by mouth Daily., Disp: , Rfl:   •  esomeprazole (nexIUM) 40 MG capsule, TAKE ONE PO  DAILY, Disp: 90 capsule, Rfl: 3  •  lisinopril (PRINIVIL,ZESTRIL) 10 MG tablet, TAKE 1 TABLET DAILY, Disp: 90 tablet, Rfl: 1  •  metoprolol succinate XL (TOPROL-XL) 25 MG 24 hr tablet, Take 0.5 tablets by mouth Daily., Disp: 45 tablet, Rfl: 1    The following portions of the patient's history were reviewed and updated as appropriate: allergies, current medications, past family history, past medical history, past social history, past surgical history and problem list.    Review of Systems   Constitutional: Negative for fever.   HENT: Positive for congestion, dental problem, postnasal drip, rhinorrhea, sinus pressure, sinus pain, sneezing and sore throat.    Eyes: Negative.    Respiratory: Positive for cough. Negative for shortness of breath and stridor.    Cardiovascular: Negative.    Gastrointestinal: Negative.    Endocrine: Negative.    Genitourinary: Negative.    Musculoskeletal: Negative.    Allergic/Immunologic: Positive for environmental allergies.       Assessment     Physical Exam   Constitutional: He is oriented to person, place,  and time. He appears well-developed and well-nourished.   HENT:   Head: Normocephalic.   Right Ear: A middle ear effusion is present. Decreased hearing is noted.   Left Ear:  No middle ear effusion. No decreased hearing is noted.   Nose: Mucosal edema and rhinorrhea present. Right sinus exhibits maxillary sinus tenderness and frontal sinus tenderness. Left sinus exhibits maxillary sinus tenderness and frontal sinus tenderness.   Neck: Neck supple. No thyromegaly present.   Cardiovascular: Normal rate, regular rhythm and normal heart sounds.   No murmur heard.  Pulmonary/Chest: Effort normal and breath sounds normal. No stridor. No respiratory distress (dry cough). He has no wheezes.   Musculoskeletal: He exhibits no edema.   Neurological: He is alert and oriented to person, place, and time.   Skin: Skin is warm and dry.   Psychiatric: He has a normal mood and affect. His behavior is normal.   Vitals reviewed.      Plan         Chino was seen today for cough, sore throat and earache.    Diagnoses and all orders for this visit:    Acute URI  -     mometasone (NASONEX) 50 MCG/ACT nasal spray; 2 sprays into the nostril(s) as directed by provider Daily.  -     cefdinir (OMNICEF) 300 MG capsule; Take 1 capsule by mouth 2 (Two) Times a Day.    Continue Zyrtec 10mg daily    Follow up as needed

## 2019-07-30 ENCOUNTER — OFFICE VISIT (OUTPATIENT)
Dept: INTERNAL MEDICINE | Facility: CLINIC | Age: 64
End: 2019-07-30

## 2019-07-30 VITALS
SYSTOLIC BLOOD PRESSURE: 120 MMHG | BODY MASS INDEX: 25.01 KG/M2 | TEMPERATURE: 98.9 F | OXYGEN SATURATION: 98 % | DIASTOLIC BLOOD PRESSURE: 82 MMHG | RESPIRATION RATE: 16 BRPM | HEIGHT: 68 IN | HEART RATE: 106 BPM | WEIGHT: 165 LBS

## 2019-07-30 DIAGNOSIS — R05.8 COUGH DUE TO ACE INHIBITOR: ICD-10-CM

## 2019-07-30 DIAGNOSIS — T46.4X5A COUGH DUE TO ACE INHIBITOR: ICD-10-CM

## 2019-07-30 DIAGNOSIS — Z91.09 ENVIRONMENTAL ALLERGIES: ICD-10-CM

## 2019-07-30 DIAGNOSIS — I10 HYPERTENSION, ESSENTIAL: ICD-10-CM

## 2019-07-30 PROBLEM — J06.9 ACUTE URI: Status: RESOLVED | Noted: 2019-07-15 | Resolved: 2019-07-30

## 2019-07-30 PROCEDURE — 99213 OFFICE O/P EST LOW 20 MIN: CPT | Performed by: NURSE PRACTITIONER

## 2019-07-30 RX ORDER — LOSARTAN POTASSIUM 25 MG/1
25 TABLET ORAL DAILY
Qty: 90 TABLET | Refills: 3 | Status: SHIPPED | OUTPATIENT
Start: 2019-07-30 | End: 2019-08-13 | Stop reason: SDUPTHER

## 2019-07-30 RX ORDER — LOSARTAN POTASSIUM 25 MG/1
25 TABLET ORAL DAILY
Qty: 90 TABLET | Refills: 3 | Status: SHIPPED | OUTPATIENT
Start: 2019-07-30 | End: 2019-07-30 | Stop reason: SDUPTHER

## 2019-07-30 RX ORDER — MONTELUKAST SODIUM 10 MG/1
10 TABLET ORAL NIGHTLY
Qty: 30 TABLET | Refills: 0 | Status: SHIPPED | OUTPATIENT
Start: 2019-07-30 | End: 2019-08-13 | Stop reason: SDUPTHER

## 2019-07-30 NOTE — PROGRESS NOTES
Chief Complaint   Patient presents with   • Nasal Congestion   • Cough       Subjective   Chino Huber is a 63 y.o. male is being seen for an acute appointment for URI symptoms. This began 3 weeks ago. He was seen 3 weeks ago and placed on Omnicef for sinusitis. He completed the Omnicef, and his sinus pressure is resolved. On Thursday 93 days after completing Omnicef, he developed sore throat, ear pain. He is coughing, nonproductive.  He is on Zyrtec 10mg and Nasonex 2 squirts each nostril daily    History of Present Illness     Current Outpatient Medications on File Prior to Visit   Medication Sig Dispense Refill   • aspirin  MG tablet Take 1 tablet by mouth Daily. 30 tablet 0   • atorvastatin (LIPITOR) 80 MG tablet Take 1 tablet by mouth Daily. 90 tablet 3   • cetirizine (zyrTEC) 10 MG tablet Take 10 mg by mouth Daily.     • esomeprazole (nexIUM) 40 MG capsule TAKE ONE PO  DAILY 90 capsule 3   • lisinopril (PRINIVIL,ZESTRIL) 10 MG tablet TAKE 1 TABLET DAILY 90 tablet 1   • metoprolol succinate XL (TOPROL-XL) 25 MG 24 hr tablet Take 0.5 tablets by mouth Daily. 45 tablet 1   • mometasone (NASONEX) 50 MCG/ACT nasal spray 2 sprays into the nostril(s) as directed by provider Daily. 17 g 0   • [DISCONTINUED] cefdinir (OMNICEF) 300 MG capsule Take 1 capsule by mouth 2 (Two) Times a Day. 20 capsule 0     No current facility-administered medications on file prior to visit.        The following portions of the patient's history were reviewed and updated as appropriate: allergies, current medications, past family history, past medical history, past social history, past surgical history and problem list.    Review of Systems   Constitutional: Negative.    HENT: Positive for sore throat. Negative for sinus pressure, sinus pain and sneezing.    Eyes: Negative.    Respiratory: Positive for cough. Negative for shortness of breath, wheezing and stridor.    Cardiovascular: Negative for chest pain and leg swelling.    Endocrine: Negative.    Genitourinary: Negative.    Musculoskeletal: Negative.    Allergic/Immunologic: Positive for environmental allergies.   Neurological: Negative.    Hematological: Negative.    Psychiatric/Behavioral: Negative.        Objective   Physical Exam   Constitutional: He is oriented to person, place, and time. He appears well-developed and well-nourished.   HENT:   Head: Normocephalic.   Right Ear: A middle ear effusion is present.   Left Ear: A middle ear effusion is present.   Nose: No mucosal edema or rhinorrhea.   Neck: Neck supple.   Cardiovascular: Normal rate, regular rhythm and normal heart sounds.   No murmur heard.  Pulmonary/Chest: Breath sounds normal. No stridor. No respiratory distress.   Musculoskeletal: He exhibits no edema.   Neurological: He is alert and oriented to person, place, and time.   Skin: Skin is warm and dry.   Psychiatric: He has a normal mood and affect. His behavior is normal.   Vitals reviewed.      Assessment/Plan   Chino was seen today for nasal congestion and cough.    Diagnoses and all orders for this visit:    Environmental allergies  -     montelukast (SINGULAIR) 10 MG tablet; Take 1 tablet by mouth Every Night.    Hypertension, essential  -     losartan (COZAAR) 25 MG tablet; Take 1 tablet by mouth Daily.    Cough due to ACE inhibitor  -     losartan (COZAAR) 25 MG tablet; Take 1 tablet by mouth Daily.    Other orders  -     Discontinue: losartan (COZAAR) 25 MG tablet; Take 1 tablet by mouth Daily.      He has an ACE cough, no evidence of sinusitis. He has underlying allergies, contributing to issues, will treat as above.     Follow up in 2 weeks with a BP check

## 2019-08-05 RX ORDER — METOPROLOL SUCCINATE 25 MG/1
TABLET, EXTENDED RELEASE ORAL
Qty: 45 TABLET | Refills: 1 | Status: SHIPPED | OUTPATIENT
Start: 2019-08-05 | End: 2019-08-13 | Stop reason: SDUPTHER

## 2019-08-13 ENCOUNTER — OFFICE VISIT (OUTPATIENT)
Dept: INTERNAL MEDICINE | Facility: CLINIC | Age: 64
End: 2019-08-13

## 2019-08-13 VITALS
SYSTOLIC BLOOD PRESSURE: 122 MMHG | WEIGHT: 167 LBS | DIASTOLIC BLOOD PRESSURE: 68 MMHG | HEART RATE: 60 BPM | RESPIRATION RATE: 16 BRPM | BODY MASS INDEX: 25.31 KG/M2 | TEMPERATURE: 98.3 F | OXYGEN SATURATION: 98 % | HEIGHT: 68 IN

## 2019-08-13 DIAGNOSIS — T46.4X5A COUGH DUE TO ACE INHIBITOR: ICD-10-CM

## 2019-08-13 DIAGNOSIS — Z91.09 ENVIRONMENTAL ALLERGIES: ICD-10-CM

## 2019-08-13 DIAGNOSIS — R05.8 COUGH DUE TO ACE INHIBITOR: ICD-10-CM

## 2019-08-13 DIAGNOSIS — I10 HYPERTENSION, ESSENTIAL: Primary | ICD-10-CM

## 2019-08-13 PROCEDURE — 99213 OFFICE O/P EST LOW 20 MIN: CPT | Performed by: NURSE PRACTITIONER

## 2019-08-13 RX ORDER — MONTELUKAST SODIUM 10 MG/1
10 TABLET ORAL NIGHTLY
Qty: 90 TABLET | Refills: 0 | Status: SHIPPED | OUTPATIENT
Start: 2019-08-13 | End: 2020-01-13

## 2019-08-13 RX ORDER — LOSARTAN POTASSIUM 25 MG/1
25 TABLET ORAL DAILY
Qty: 90 TABLET | Refills: 3 | Status: SHIPPED | OUTPATIENT
Start: 2019-08-13 | End: 2020-01-02 | Stop reason: SDUPTHER

## 2019-08-13 NOTE — PROGRESS NOTES
Chief Complaint   Patient presents with   • Follow-up   • Hypertension       Subjective     Chino Huber is a 63 y.o. male being seen for a follow up appointment today regarding  HTN. He was in the office 2 weeks ago for cough and allergies. He was switched from Lisinopril to Losartan 25mg for HTN. This has resolved his cough, and he is sleeping much better. No BP checks at home.     He is on Singulair 10 mg, and it has improved hi allergies by > 75%. He has less nasal congestion. He denies side effects of medications.       History of Present Illness     Allergies   Allergen Reactions   • Lisinopril Cough         Current Outpatient Medications:   •  aspirin  MG tablet, Take 1 tablet by mouth Daily., Disp: 30 tablet, Rfl: 0  •  atorvastatin (LIPITOR) 80 MG tablet, Take 1 tablet by mouth Daily., Disp: 90 tablet, Rfl: 3  •  cetirizine (zyrTEC) 10 MG tablet, Take 10 mg by mouth Daily., Disp: , Rfl:   •  esomeprazole (nexIUM) 40 MG capsule, TAKE ONE PO  DAILY, Disp: 90 capsule, Rfl: 3  •  losartan (COZAAR) 25 MG tablet, Take 1 tablet by mouth Daily., Disp: 90 tablet, Rfl: 3  •  metoprolol succinate XL (TOPROL-XL) 25 MG 24 hr tablet, Take 0.5 tablets by mouth Daily., Disp: 45 tablet, Rfl: 1  •  mometasone (NASONEX) 50 MCG/ACT nasal spray, 2 sprays into the nostril(s) as directed by provider Daily., Disp: 17 g, Rfl: 0  •  montelukast (SINGULAIR) 10 MG tablet, Take 1 tablet by mouth Every Night., Disp: 30 tablet, Rfl: 0    The following portions of the patient's history were reviewed and updated as appropriate: allergies, current medications, past family history, past medical history, past social history, past surgical history and problem list.    Review of Systems   Constitutional: Negative.    HENT: Negative for rhinorrhea, sinus pressure, sinus pain and sneezing.    Eyes: Negative.    Respiratory: Negative.    Cardiovascular: Negative.  Negative for chest pain, palpitations and leg swelling.   Gastrointestinal:  Negative.    Genitourinary: Negative.    Musculoskeletal: Negative.  Negative for arthralgias, back pain and gait problem.   Allergic/Immunologic: Positive for environmental allergies.   Neurological: Negative.    Hematological: Negative.    Psychiatric/Behavioral: Negative.        Assessment     Physical Exam   Constitutional: He is oriented to person, place, and time. He appears well-developed and well-nourished. No distress.   HENT:   Head: Normocephalic.   Right Ear: External ear normal. Decreased hearing is noted.   Left Ear: External ear normal. Decreased hearing is noted.   Nose: Nose normal.   Mouth/Throat: Oropharynx is clear and moist.   Neck: Neck supple.   Cardiovascular: Normal rate, regular rhythm and normal heart sounds.   No murmur heard.  Pulmonary/Chest: Effort normal and breath sounds normal. No stridor. No respiratory distress.   Musculoskeletal: He exhibits no edema.   Neurological: He is alert and oriented to person, place, and time.   Skin: Skin is warm and dry.   Psychiatric: He has a normal mood and affect. His behavior is normal.   Vitals reviewed.      Plan      Diagnosis Plan   1. Hypertension, essential  losartan (COZAAR) 25 MG tablet   2. Environmental allergies  montelukast (SINGULAIR) 10 MG tablet   3. Cough due to ACE inhibitor  losartan (COZAAR) 25 MG tablet     Follow up as scheduled

## 2019-08-24 DIAGNOSIS — Z91.09 ENVIRONMENTAL ALLERGIES: ICD-10-CM

## 2019-08-26 RX ORDER — MONTELUKAST SODIUM 10 MG/1
TABLET ORAL
Qty: 30 TABLET | Refills: 0 | Status: SHIPPED | OUTPATIENT
Start: 2019-08-26 | End: 2019-11-19 | Stop reason: SDUPTHER

## 2019-11-12 ENCOUNTER — PATIENT MESSAGE (OUTPATIENT)
Dept: INTERNAL MEDICINE | Facility: CLINIC | Age: 64
End: 2019-11-12

## 2019-11-14 LAB
ALBUMIN SERPL-MCNC: 4.5 G/DL (ref 3.5–5.2)
ALBUMIN/GLOB SERPL: 2.5 G/DL
ALP SERPL-CCNC: 121 U/L (ref 39–117)
ALT SERPL-CCNC: 41 U/L (ref 1–41)
AST SERPL-CCNC: 23 U/L (ref 1–40)
BILIRUB SERPL-MCNC: 0.3 MG/DL (ref 0.2–1.2)
BUN SERPL-MCNC: 19 MG/DL (ref 8–23)
BUN/CREAT SERPL: 17.1 (ref 7–25)
CALCIUM SERPL-MCNC: 9.1 MG/DL (ref 8.6–10.5)
CHLORIDE SERPL-SCNC: 103 MMOL/L (ref 98–107)
CHOLEST SERPL-MCNC: 111 MG/DL (ref 0–200)
CHOLEST/HDLC SERPL: 2.85 {RATIO}
CO2 SERPL-SCNC: 27.7 MMOL/L (ref 22–29)
CREAT SERPL-MCNC: 1.11 MG/DL (ref 0.76–1.27)
GLOBULIN SER CALC-MCNC: 1.8 GM/DL
GLUCOSE SERPL-MCNC: 89 MG/DL (ref 65–99)
HCV AB S/CO SERPL IA: <0.1 S/CO RATIO (ref 0–0.9)
HDLC SERPL-MCNC: 39 MG/DL (ref 40–60)
LDLC SERPL CALC-MCNC: 54 MG/DL (ref 0–100)
POTASSIUM SERPL-SCNC: 4.4 MMOL/L (ref 3.5–5.2)
PROT SERPL-MCNC: 6.3 G/DL (ref 6–8.5)
SODIUM SERPL-SCNC: 143 MMOL/L (ref 136–145)
TRIGL SERPL-MCNC: 92 MG/DL (ref 0–150)
VLDLC SERPL CALC-MCNC: 18.4 MG/DL

## 2019-11-19 ENCOUNTER — OFFICE VISIT (OUTPATIENT)
Dept: INTERNAL MEDICINE | Facility: CLINIC | Age: 64
End: 2019-11-19

## 2019-11-19 VITALS
RESPIRATION RATE: 16 BRPM | SYSTOLIC BLOOD PRESSURE: 132 MMHG | DIASTOLIC BLOOD PRESSURE: 80 MMHG | HEART RATE: 65 BPM | TEMPERATURE: 98.4 F | OXYGEN SATURATION: 98 % | WEIGHT: 168 LBS | HEIGHT: 68 IN | BODY MASS INDEX: 25.46 KG/M2

## 2019-11-19 DIAGNOSIS — K21.9 GASTROESOPHAGEAL REFLUX DISEASE WITHOUT ESOPHAGITIS: ICD-10-CM

## 2019-11-19 DIAGNOSIS — E78.2 MIXED HYPERLIPIDEMIA: ICD-10-CM

## 2019-11-19 DIAGNOSIS — Z23 IMMUNIZATION DUE: ICD-10-CM

## 2019-11-19 DIAGNOSIS — Z12.5 SCREENING FOR PROSTATE CANCER: ICD-10-CM

## 2019-11-19 DIAGNOSIS — I10 HYPERTENSION, ESSENTIAL: ICD-10-CM

## 2019-11-19 DIAGNOSIS — I25.10 CORONARY ARTERY DISEASE INVOLVING NATIVE CORONARY ARTERY OF NATIVE HEART WITHOUT ANGINA PECTORIS: ICD-10-CM

## 2019-11-19 PROCEDURE — 90471 IMMUNIZATION ADMIN: CPT | Performed by: NURSE PRACTITIONER

## 2019-11-19 PROCEDURE — 90632 HEPA VACCINE ADULT IM: CPT | Performed by: NURSE PRACTITIONER

## 2019-11-19 PROCEDURE — 99214 OFFICE O/P EST MOD 30 MIN: CPT | Performed by: NURSE PRACTITIONER

## 2019-11-19 RX ORDER — PANTOPRAZOLE SODIUM 40 MG/1
40 TABLET, DELAYED RELEASE ORAL DAILY
Qty: 30 TABLET | Refills: 0 | Status: SHIPPED | OUTPATIENT
Start: 2019-11-19 | End: 2019-12-02

## 2019-11-19 NOTE — PROGRESS NOTES
Chief Complaint   Patient presents with   • Follow-up   • Hypertension   • Hyperlipidemia       Subjective     Chino Huber is a 64 y.o. male being seen for a follow up appointment today regarding HTN, CAD, GERD, and hyperlipdiemia. He is followed by Kisha Berman and has an appointment tomorrow. He had a drug eludting stent placed in 2014. She also monitors his aortic valve and ascending aorta dilatation with either a 2 D ECHO and CT chest. BP checks at home 130s/80s at home. He has not been exercsing regularly due to recent vacation. He is on Losartna 25mg and Toprol XL 25mg daily. He denies CP, SOA, edema.     He takes Nexium 40 mg daily for indigestion, not taking on an empty tstomach. He is having more indigestion, but admits to eating out frequently.     History of Present Illness     Allergies   Allergen Reactions   • Lisinopril Cough         Current Outpatient Medications:   •  aspirin  MG tablet, Take 1 tablet by mouth Daily., Disp: 30 tablet, Rfl: 0  •  atorvastatin (LIPITOR) 80 MG tablet, Take 1 tablet by mouth Daily., Disp: 90 tablet, Rfl: 3  •  cetirizine (zyrTEC) 10 MG tablet, Take 10 mg by mouth Daily., Disp: , Rfl:   •  esomeprazole (nexIUM) 40 MG capsule, TAKE ONE PO  DAILY, Disp: 90 capsule, Rfl: 3  •  losartan (COZAAR) 25 MG tablet, Take 1 tablet by mouth Daily., Disp: 90 tablet, Rfl: 3  •  metoprolol succinate XL (TOPROL-XL) 25 MG 24 hr tablet, Take 0.5 tablets by mouth Daily., Disp: 45 tablet, Rfl: 1  •  mometasone (NASONEX) 50 MCG/ACT nasal spray, 2 sprays into the nostril(s) as directed by provider Daily., Disp: 17 g, Rfl: 0  •  montelukast (SINGULAIR) 10 MG tablet, Take 1 tablet by mouth Every Night., Disp: 90 tablet, Rfl: 0    The following portions of the patient's history were reviewed and updated as appropriate: allergies, current medications, past family history, past medical history, past social history, past surgical history and problem list.    Review of Systems   Constitutional:  Negative.    HENT: Negative.    Eyes: Negative.    Cardiovascular: Negative.  Negative for chest pain, palpitations and leg swelling.   Gastrointestinal: Positive for abdominal distention. Negative for diarrhea, nausea, rectal pain and vomiting.   Genitourinary: Negative.    Musculoskeletal: Negative.    Skin: Negative.    Allergic/Immunologic: Positive for environmental allergies.   Neurological: Negative.    Hematological: Negative.    Psychiatric/Behavioral: Negative.        Assessment     Physical Exam   Constitutional: He is oriented to person, place, and time. He appears well-developed and well-nourished.   HENT:   Head: Normocephalic.   Right Ear: External ear normal.   Left Ear: External ear normal.   Nose: Nose normal.   Mouth/Throat: Oropharynx is clear and moist. No oropharyngeal exudate.   Cardiovascular: Normal rate and regular rhythm.   Murmur heard.   Systolic murmur is present with a grade of 3/6.   Diastolic murmur is present with a grade of 3/6.  Pulmonary/Chest: Effort normal and breath sounds normal. No stridor. No respiratory distress. He has no wheezes.   Musculoskeletal: He exhibits no edema.   Neurological: He is alert and oriented to person, place, and time.   Psychiatric: He has a normal mood and affect. His behavior is normal.   Vitals reviewed.      Plan     His fasting labs were reviewed with the patient from last week.     Chino was seen today for follow-up, hypertension and hyperlipidemia.    Diagnoses and all orders for this visit:    Hypertension, essential  -     Comprehensive metabolic panel; Future  -     Lipid panel; Future  -     CBC and Differential; Future  -     TSH; Future    Mixed hyperlipidemia  -     Comprehensive metabolic panel; Future  -     Lipid panel; Future  -     CBC and Differential; Future  -     TSH; Future    Coronary artery disease involving native coronary artery of native heart without angina pectoris  -     Comprehensive metabolic panel; Future  -     Lipid  panel; Future  -     CBC and Differential; Future    Screening for prostate cancer  -     PSA; Future    Gastroesophageal reflux disease without esophagitis  -     pantoprazole (PROTONIX) 40 MG EC tablet; Take 1 tablet by mouth Daily.    Immunization due  -     Hepatitis A Vaccine Adult IM    Hep A #2 vaccine today    Follow up in 6 months with labs

## 2019-11-24 ENCOUNTER — RESULTS ENCOUNTER (OUTPATIENT)
Dept: INTERNAL MEDICINE | Facility: CLINIC | Age: 64
End: 2019-11-24

## 2019-11-24 DIAGNOSIS — I25.10 CORONARY ARTERY DISEASE INVOLVING NATIVE CORONARY ARTERY OF NATIVE HEART WITHOUT ANGINA PECTORIS: ICD-10-CM

## 2019-11-24 DIAGNOSIS — Z12.5 SCREENING FOR PROSTATE CANCER: ICD-10-CM

## 2019-11-24 DIAGNOSIS — E78.2 MIXED HYPERLIPIDEMIA: ICD-10-CM

## 2019-11-24 DIAGNOSIS — I10 HYPERTENSION, ESSENTIAL: ICD-10-CM

## 2019-11-26 ENCOUNTER — TELEPHONE (OUTPATIENT)
Dept: CARDIOLOGY | Facility: CLINIC | Age: 64
End: 2019-11-26

## 2019-11-26 NOTE — TELEPHONE ENCOUNTER
Please let him know that the cholesterol numbers looked better and start the HDL had improved and almost to goal.  Good job.  He does have slight elevation of 1 of his liver function tests and should see his PCP regarding this.[email or web address]

## 2019-12-02 ENCOUNTER — OFFICE VISIT (OUTPATIENT)
Dept: INTERNAL MEDICINE | Facility: CLINIC | Age: 64
End: 2019-12-02

## 2019-12-02 VITALS
TEMPERATURE: 98.4 F | HEIGHT: 68 IN | HEART RATE: 56 BPM | SYSTOLIC BLOOD PRESSURE: 130 MMHG | OXYGEN SATURATION: 98 % | BODY MASS INDEX: 26.37 KG/M2 | WEIGHT: 174 LBS | DIASTOLIC BLOOD PRESSURE: 80 MMHG | RESPIRATION RATE: 16 BRPM

## 2019-12-02 DIAGNOSIS — K21.9 GASTROESOPHAGEAL REFLUX DISEASE, ESOPHAGITIS PRESENCE NOT SPECIFIED: Primary | ICD-10-CM

## 2019-12-02 DIAGNOSIS — R10.13 ABDOMINAL PAIN, EPIGASTRIC: ICD-10-CM

## 2019-12-02 PROCEDURE — 99213 OFFICE O/P EST LOW 20 MIN: CPT | Performed by: NURSE PRACTITIONER

## 2019-12-02 RX ORDER — ESOMEPRAZOLE MAGNESIUM 40 MG/1
40 CAPSULE, DELAYED RELEASE ORAL 2 TIMES DAILY
Qty: 180 CAPSULE | Refills: 3 | Status: SHIPPED | OUTPATIENT
Start: 2019-12-02 | End: 2019-12-16 | Stop reason: DRUGHIGH

## 2019-12-02 NOTE — PROGRESS NOTES
"Chief Complaint   Patient presents with   • Follow-up   • Heartburn       Subjective     Chino Huber is a 64 y.o. male being seen for a follow up appointment today regarding GERD. He was in the office 11-, complaining of increasing indigestion for the past month. He was switched from Nexium to Protonix 40mg daily and instructed to take on an empty stomach. He was also given dietary precautions. He is avoiding red meat , fried foods, and fast food. This did not help, so he started taking both a Nexium 40 mg and a Protonix 40 mg about 1 week ag. Today, he is reporting pain pain over stomach, going straight thru to the back. Associated feeling of fullness in throat, \"like a lump\" and a burning pain in sternum. He had an Upper GI with Dr. Rose 3- showing gastritis, duodenitis and small hiatal hernia. No N/V/D. He denies any blood in stool.       History of Present Illness     Allergies   Allergen Reactions   • Lisinopril Cough         Current Outpatient Medications:   •  aspirin  MG tablet, Take 1 tablet by mouth Daily., Disp: 30 tablet, Rfl: 0  •  atorvastatin (LIPITOR) 80 MG tablet, Take 1 tablet by mouth Daily., Disp: 90 tablet, Rfl: 3  •  cetirizine (zyrTEC) 10 MG tablet, Take 10 mg by mouth Daily., Disp: , Rfl:   •  losartan (COZAAR) 25 MG tablet, Take 1 tablet by mouth Daily., Disp: 90 tablet, Rfl: 3  •  metoprolol succinate XL (TOPROL-XL) 25 MG 24 hr tablet, Take 0.5 tablets by mouth Daily., Disp: 45 tablet, Rfl: 1  •  mometasone (NASONEX) 50 MCG/ACT nasal spray, 2 sprays into the nostril(s) as directed by provider Daily., Disp: 17 g, Rfl: 0  •  montelukast (SINGULAIR) 10 MG tablet, Take 1 tablet by mouth Every Night., Disp: 90 tablet, Rfl: 0  •  pantoprazole (PROTONIX) 40 MG EC tablet, Take 1 tablet by mouth Daily., Disp: 30 tablet, Rfl: 0    The following portions of the patient's history were reviewed and updated as appropriate: allergies, current medications, past family history, " past medical history, past social history, past surgical history and problem list.    Review of Systems   Constitutional: Negative.    HENT: Negative.    Cardiovascular: Negative for chest pain, palpitations and leg swelling.   Gastrointestinal: Positive for abdominal pain and constipation. Negative for anal bleeding, blood in stool, diarrhea, nausea, rectal pain and vomiting.   Musculoskeletal: Negative.    Skin: Negative.    Psychiatric/Behavioral: Negative.        Assessment     Physical Exam   Constitutional: He is oriented to person, place, and time. He appears well-developed and well-nourished.   HENT:   Head: Normocephalic.   Cardiovascular: Normal rate, regular rhythm and normal heart sounds.   No murmur heard.  Pulmonary/Chest: Effort normal and breath sounds normal. No stridor. No respiratory distress.   Abdominal: Normal appearance and bowel sounds are normal. He exhibits no distension, no ascites and no mass. There is no hepatosplenomegaly. There is tenderness in the epigastric area. There is no rigidity, no rebound, no guarding, no CVA tenderness, no tenderness at McBurney's point and negative Quintana's sign. No hernia. Hernia confirmed negative in the ventral area.   Musculoskeletal: He exhibits no edema.   Neurological: He is alert and oriented to person, place, and time.   Psychiatric: He has a normal mood and affect. His behavior is normal.   Vitals reviewed.      Plan     His fasting labs were reviewed with the patient from last week.     Chino was seen today for follow-up and heartburn.    Diagnoses and all orders for this visit:    Gastroesophageal reflux disease, esophagitis presence not specified  -     CBC & Differential  -     Amylase  -     Lipase  -     H. Pylori IgM, Blood  -     Ambulatory Referral to Gastroenterology    Abdominal pain, epigastric  -     CBC & Differential  -     Amylase  -     Lipase  -     H. Pylori IgM, Blood  -     Ambulatory Referral to Gastroenterology    Other  orders  -     esomeprazole (nexIUM) 40 MG capsule; Take 1 capsule by mouth 2 (Two) Times a Day.        Increase Nexium to BID, screen for pancreatitis.    Follow up with Dr. Rose for Upper GI

## 2019-12-03 LAB
AMYLASE SERPL-CCNC: 53 U/L (ref 31–124)
BASOPHILS # BLD AUTO: 0 X10E3/UL (ref 0–0.2)
BASOPHILS NFR BLD AUTO: 0 %
EOSINOPHIL # BLD AUTO: 0.1 X10E3/UL (ref 0–0.4)
EOSINOPHIL NFR BLD AUTO: 2 %
ERYTHROCYTE [DISTWIDTH] IN BLOOD BY AUTOMATED COUNT: 14.2 % (ref 12.3–15.4)
H PYLORI IGM SER-ACNC: <9 UNITS (ref 0–8.9)
HCT VFR BLD AUTO: 44.9 % (ref 37.5–51)
HGB BLD-MCNC: 15.5 G/DL (ref 13–17.7)
IMM GRANULOCYTES # BLD AUTO: 0 X10E3/UL (ref 0–0.1)
IMM GRANULOCYTES NFR BLD AUTO: 0 %
LIPASE SERPL-CCNC: 28 U/L (ref 13–78)
LYMPHOCYTES # BLD AUTO: 2 X10E3/UL (ref 0.7–3.1)
LYMPHOCYTES NFR BLD AUTO: 32 %
MCH RBC QN AUTO: 29.5 PG (ref 26.6–33)
MCHC RBC AUTO-ENTMCNC: 34.5 G/DL (ref 31.5–35.7)
MCV RBC AUTO: 85 FL (ref 79–97)
MONOCYTES # BLD AUTO: 0.5 X10E3/UL (ref 0.1–0.9)
MONOCYTES NFR BLD AUTO: 9 %
NEUTROPHILS # BLD AUTO: 3.6 X10E3/UL (ref 1.4–7)
NEUTROPHILS NFR BLD AUTO: 57 %
PLATELET # BLD AUTO: 228 X10E3/UL (ref 150–450)
RBC # BLD AUTO: 5.26 X10E6/UL (ref 4.14–5.8)
WBC # BLD AUTO: 6.2 X10E3/UL (ref 3.4–10.8)

## 2019-12-14 DIAGNOSIS — K21.9 GASTROESOPHAGEAL REFLUX DISEASE WITHOUT ESOPHAGITIS: ICD-10-CM

## 2019-12-16 RX ORDER — PANTOPRAZOLE SODIUM 40 MG/1
TABLET, DELAYED RELEASE ORAL
Qty: 30 TABLET | Refills: 0 | Status: SHIPPED | OUTPATIENT
Start: 2019-12-16 | End: 2020-01-02

## 2020-01-02 ENCOUNTER — OFFICE VISIT (OUTPATIENT)
Dept: GASTROENTEROLOGY | Facility: CLINIC | Age: 65
End: 2020-01-02

## 2020-01-02 VITALS
HEIGHT: 68 IN | SYSTOLIC BLOOD PRESSURE: 138 MMHG | BODY MASS INDEX: 26.25 KG/M2 | WEIGHT: 173.2 LBS | TEMPERATURE: 98.5 F | DIASTOLIC BLOOD PRESSURE: 80 MMHG

## 2020-01-02 DIAGNOSIS — K21.9 GASTROESOPHAGEAL REFLUX DISEASE, ESOPHAGITIS PRESENCE NOT SPECIFIED: Primary | ICD-10-CM

## 2020-01-02 DIAGNOSIS — R13.10 DYSPHAGIA, UNSPECIFIED TYPE: ICD-10-CM

## 2020-01-02 DIAGNOSIS — D12.6 ADENOMATOUS POLYP OF COLON, UNSPECIFIED PART OF COLON: ICD-10-CM

## 2020-01-02 DIAGNOSIS — R05.8 COUGH DUE TO ACE INHIBITOR: ICD-10-CM

## 2020-01-02 DIAGNOSIS — Z80.0 FH: COLON CANCER IN FIRST DEGREE RELATIVE <60 YEARS OLD: ICD-10-CM

## 2020-01-02 DIAGNOSIS — T46.4X5A COUGH DUE TO ACE INHIBITOR: ICD-10-CM

## 2020-01-02 DIAGNOSIS — I10 HYPERTENSION, ESSENTIAL: ICD-10-CM

## 2020-01-02 DIAGNOSIS — R09.89 GLOBUS SENSATION: ICD-10-CM

## 2020-01-02 PROBLEM — R09.A2 GLOBUS SENSATION: Status: ACTIVE | Noted: 2020-01-02

## 2020-01-02 PROCEDURE — 99214 OFFICE O/P EST MOD 30 MIN: CPT | Performed by: NURSE PRACTITIONER

## 2020-01-02 RX ORDER — LOSARTAN POTASSIUM 25 MG/1
25 TABLET ORAL DAILY
Qty: 90 TABLET | Refills: 3 | Status: SHIPPED | OUTPATIENT
Start: 2020-01-02 | End: 2020-05-20

## 2020-01-02 NOTE — PROGRESS NOTES
Chief Complaint   Patient presents with   • Follow-up   • Heartburn       Chino Huber is a  64 y.o. male here for a follow up visit for GERD.    HPI  64-year-old male presents today for follow-up visit for GERD.  He is a patient of Dr. Rose.  He was last seen in the office on 3/21/2017.  He has a history of GERD/esophagitis and admits over the last several months he has had worsening reflux symptoms including globus sensation even a few episodes of dysphagia.  Tells me this is worse on the left side of his throat.  He is also been having some weird symptoms where he will have facial numbness along the left side of his face and around his lips and under his left side of his nose.  He has had this happen several years ago and ended up seeing an ear nose and throat doctor and given Neurontin for nerve pain.  Nothing could be diagnosed at that time.  Patient has since been to [place] for 3 weeks and admits since coming back from that he is had the symptoms return.  The symptoms are very alarming to him.  He is worried something is going on that he does not know about.  His reflux got worse so his primary care provider change the Nexium 40 mg once daily to Protonix 40 mg twice daily but this did not help.  So he went back on the Nexium 40 mg but it was increased to twice daily.  He has been doing this now for couple weeks and admits his reflux enzymes are much better.  But not 100% relieved.  He denies any dysphagia now but he continues to have the globus sensation from time to time.  He denies any abdominal pain, nausea and vomiting, diarrhea, constipation, rectal bleeding or melena.  He does have a history of adenomatous colon polyps.  He also has a family history of colon cancer with his mother.  His last EGD and colonoscopy was done in 2017.  Admits his appetite is good and his weight is stable.  Past Medical History:   Diagnosis Date   • Acute sinusitis    • Allergic 1969   • Aortic root dilatation  (CMS/HCC)    • Aortic stenosis    • Aortic valve stenosis     mild   • Arteriosclerotic coronary artery disease    • Bicuspid aortic valve    • CAD (coronary artery disease)    • Cancer (CMS/HCC) 2009    Prostate   • Chest pain    • Colon polyp    • Cough    • Dilated aortic root (CMS/HCC)    • ED (erectile dysfunction)    • Erectile dysfunction 2009    Prostatectomy   • Folliculitis    • GERD (gastroesophageal reflux disease)    • Heart murmur 2013    Aortic bicuspid   • Heartburn    • Hyperlipidemia    • Hypertension    • Infectious viral hepatitis    • Nonspecific abnormal findings on radiological and examination of intrathoracic organs    • Precordial pain    • Reflux esophagitis    • Sore throat    • Thoracic aortic aneurysm without rupture (CMS/HCC)    • Throat pain        Past Surgical History:   Procedure Laterality Date   • ABDOMINAL SURGERY  Prostate   • CARDIAC CATHETERIZATION  6/2013    Stent placement   • COLONOSCOPY     • COLONOSCOPY     • COLONOSCOPY N/A 2/3/2017    Procedure: COLONOSCOPY TO CECUM WITH HOT SNARE POLYPECTOMY WITH NORMAL SALINE INJECTION AND  TWO RESOLUTION CLIPS;  Surgeon: Fam LILLY MD;  Location: [facility];  Service:    • CORONARY ANGIOPLASTY WITH STENT PLACEMENT     • CORONARY STENT PLACEMENT  6/2013   • ENDOSCOPY N/A 3/15/2017    Procedure: ESOPHAGOGASTRODUODENOSCOPY WITH BIOPSIES (COLD);  Surgeon: Fam LILLY MD;  Location: [facility];  Service:    • PROSTATE SURGERY     • PROSTATE SURGERY     • RHINOPLASTY     • RHINOPLASTY     • SINUS SURGERY  1987/1988   • TONSILLECTOMY  1962   • UPPER GASTROINTESTINAL ENDOSCOPY  3/2017       Scheduled Meds:    Continuous Infusions:  No current facility-administered medications for this visit.     PRN Meds:.    Allergies   Allergen Reactions   • Lisinopril Cough       Social History     Socioeconomic History   • Marital status:      Spouse name: Not on file   • Number of children: Not on file   • Years of  education: Not on file   • Highest education level: Not on file   Tobacco Use   • Smoking status: Never Smoker   • Smokeless tobacco: Never Used   • Tobacco comment: caffeine use   Substance and Sexual Activity   • Alcohol use: Yes     Alcohol/week: 1.0 standard drinks     Types: 1 Cans of beer per week   • Drug use: No   • Sexual activity: Not Currently     Partners: Female     Birth control/protection: Surgical     Comment: Vasectomy, Prostatectomy   Social History Narrative    Pt lives with his wife. He has 2 children, ages 20 and 32. He is a retired . He has a grandson that stays once weekly with him.        Family History   Problem Relation Age of Onset   • Heart disease Mother    • Hypertension Mother    • Colon cancer Mother          due to colon cancer   • Cancer Mother         colon   • Hearing loss Mother    • Heart disease Father    • Hypertension Father    • Other Father         CABG, heart valve replacement   • Coronary artery disease Father    • Heart failure Father    • Cancer Father         prostate   • Glaucoma Father    • Cancer Sister         Breast   • Hypertension Sister    • Colon polyps Sister         Recent polyp remival   • Cancer Sister         Breast   • Hypertension Sister        Review of Systems   Constitutional: Negative for appetite change, chills, diaphoresis, fatigue, fever and unexpected weight change.   HENT: Positive for trouble swallowing. Negative for nosebleeds, postnasal drip, sore throat and voice change.    Respiratory: Negative for cough, choking, chest tightness, shortness of breath and wheezing.    Cardiovascular: Negative for chest pain, palpitations and leg swelling.   Gastrointestinal: Negative for abdominal distention, abdominal pain, anal bleeding, blood in stool, constipation, diarrhea, nausea, rectal pain and vomiting.   Endocrine: Negative for polydipsia, polyphagia and polyuria.   Musculoskeletal: Negative for gait problem.    Skin: Negative for rash and wound.   Allergic/Immunologic: Negative for food allergies.   Neurological: Negative for dizziness, speech difficulty and light-headedness.   Psychiatric/Behavioral: Negative for confusion, self-injury, sleep disturbance and suicidal ideas.       Vitals:    01/02/20 1027   BP: 138/80   Temp: 98.5 °F (36.9 °C)       Physical Exam   Constitutional: He is oriented to person, place, and time. He appears well-developed and well-nourished. He does not appear ill. No distress.   HENT:   Head: Normocephalic.   Eyes: Pupils are equal, round, and reactive to light.   Cardiovascular: Normal rate, regular rhythm and normal heart sounds.   Pulmonary/Chest: Effort normal and breath sounds normal.   Abdominal: Soft. Bowel sounds are normal. He exhibits no distension and no mass. There is no hepatosplenomegaly. There is no tenderness. There is no rebound and no guarding. No hernia.   Musculoskeletal: Normal range of motion.   Neurological: He is alert and oriented to person, place, and time.   Skin: Skin is warm and dry.   Psychiatric: He has a normal mood and affect. His speech is normal and behavior is normal. Judgment normal.       No images are attached to the encounter.    Assessment and plan     1. Gastroesophageal reflux disease, esophagitis presence not specified  - Case Request; Standing  - Case Request    2. Globus sensation  - Case Request; Standing  - Case Request    3. Dysphagia, unspecified type  - Case Request; Standing  - Case Request    4. Adenomatous polyp of colon, unspecified part of colon  - Case Request; Standing  - Case Request    5. FH: colon cancer in first degree relative <60 years old  - Case Request; Standing  - Case Request    Given history and current symptoms recommend EGD and colonoscopy with Dr. Rose for further evaluation.  Patient is agreeable to the scope.  Also want the patient to follow-up with an ear nose and throat doctor for further evaluation given his  recurrent symptoms.  Patient to call the office with any issues.  Patient to go ahead and continue Nexium 40 mg twice daily for now.  Continue GERD precautions.

## 2020-01-11 DIAGNOSIS — Z91.09 ENVIRONMENTAL ALLERGIES: ICD-10-CM

## 2020-01-13 RX ORDER — MONTELUKAST SODIUM 10 MG/1
TABLET ORAL
Qty: 90 TABLET | Refills: 0 | Status: SHIPPED | OUTPATIENT
Start: 2020-01-13 | End: 2020-05-18

## 2020-01-13 RX ORDER — ATORVASTATIN CALCIUM 80 MG/1
TABLET, FILM COATED ORAL
Qty: 90 TABLET | Refills: 3 | Status: SHIPPED | OUTPATIENT
Start: 2020-01-13 | End: 2020-12-15 | Stop reason: SDUPTHER

## 2020-01-22 ENCOUNTER — OFFICE VISIT (OUTPATIENT)
Dept: CARDIOLOGY | Facility: CLINIC | Age: 65
End: 2020-01-22

## 2020-01-22 VITALS
WEIGHT: 165 LBS | SYSTOLIC BLOOD PRESSURE: 164 MMHG | DIASTOLIC BLOOD PRESSURE: 84 MMHG | HEIGHT: 68 IN | BODY MASS INDEX: 25.01 KG/M2 | HEART RATE: 50 BPM

## 2020-01-22 DIAGNOSIS — I25.10 CORONARY ARTERY DISEASE INVOLVING NATIVE CORONARY ARTERY OF NATIVE HEART WITHOUT ANGINA PECTORIS: Primary | ICD-10-CM

## 2020-01-22 DIAGNOSIS — I35.0 AORTIC VALVE STENOSIS, MILD: ICD-10-CM

## 2020-01-22 DIAGNOSIS — I71.20 THORACIC AORTIC ANEURYSM WITHOUT RUPTURE (HCC): ICD-10-CM

## 2020-01-22 DIAGNOSIS — I10 HYPERTENSION, ESSENTIAL: ICD-10-CM

## 2020-01-22 PROCEDURE — 93000 ELECTROCARDIOGRAM COMPLETE: CPT | Performed by: INTERNAL MEDICINE

## 2020-01-22 PROCEDURE — 99214 OFFICE O/P EST MOD 30 MIN: CPT | Performed by: INTERNAL MEDICINE

## 2020-01-22 NOTE — PROGRESS NOTES
Date of Office Visit: 2020  Encounter Provider: Kisha Berman MD  Place of Service: [facility] CARDIOLOGY  Patient Name: Chino Huber  :1955    Chief complaint  Coronary artery disease, thoracic aortic aneurysm, aortic valve stenosis    History of Present Illness  The patient is a pleasant, 64 year-old gentleman with history of prostate cancer status  post resection, hypertension, and hyperlipidemia who in 2014 was noted to have coronary  artery disease when he presented with chest pain and abnormal stress test. A cardiac  catheterization revealed 90% stenosis of the circumflex artery and of the right coronary  artery. The left anterior descending and left main were normal. He underwent drug-coated  stent placement to both the circumflex and the right coronary artery.  He had a CT angio of his chest in May 2015 that revealed stable thoracic aneurysm measuring 4.0 cm.  In 2018 he had an echocardiogram that showed normal systolic function with mild left atrial enlargement, bicuspid aortic valve with mild stenosis with a mean gradient of 10 mmHg and mildly dilated asending aorta measuring 3.6 cm.  CT angiogram of the chest showed stable dilated a sending aorta measuring 4.1 cm at the ascending aortic level.    Patient has not been as active as before due to troubles with hemorrhoids as well as some reflux symptoms.  He is to have an upper and lower scope in the next several weeks.  He describes clear reflux symptoms with discomfort in the lower chest and upper chest associated with regurgitation.  This occurs prior to eating.  Eating food actually improves it.  It is not exertional and it is very different from his anginal pain.  He has not had any other types of chest pain.  He has been troubled with some left-sided facial paresthesias that are intermittent since September.  He attributed it to change to losartan from lisinopril.  At that time he had had a cough  "that resolved with the change to losartan with resolution of the cough.  He describes paresthesias in the left side around his eye as well as more pronounced over the left periorbital region.  He states he had something similar to this in 2016 and after neurology evaluation it was felt to be a \"neuropathy\".  He was given gabapentin with some relief of his symptoms though he eventually came off of this.  He denies any palpitations syncope near syncope other focal motor or sensory deficits    Past Medical History:   Diagnosis Date   • Acute sinusitis    • Allergic 1969   • Aortic root dilatation (CMS/HCC)    • Aortic stenosis    • Aortic valve stenosis     mild   • Arteriosclerotic coronary artery disease    • Bicuspid aortic valve    • CAD (coronary artery disease)    • Cancer (CMS/HCC) 2009    Prostate   • Chest pain    • Colon polyp    • Cough    • Dilated aortic root (CMS/HCC)    • ED (erectile dysfunction)    • Erectile dysfunction 2009    Prostatectomy   • Folliculitis    • GERD (gastroesophageal reflux disease)    • Heart murmur 2013    Aortic bicuspid   • Heartburn    • Hyperlipidemia    • Hypertension    • Infectious viral hepatitis    • Nonspecific abnormal findings on radiological and examination of intrathoracic organs    • Precordial pain    • Reflux esophagitis    • Sore throat    • Thoracic aortic aneurysm without rupture (CMS/HCC)    • Throat pain      Past Surgical History:   Procedure Laterality Date   • ABDOMINAL SURGERY  Prostate   • CARDIAC CATHETERIZATION  6/2013    Stent placement   • COLONOSCOPY     • COLONOSCOPY     • COLONOSCOPY N/A 2/3/2017    Procedure: COLONOSCOPY TO CECUM WITH HOT SNARE POLYPECTOMY WITH NORMAL SALINE INJECTION AND  TWO RESOLUTION CLIPS;  Surgeon: Fam LILLY MD;  Location: [facility] ENDOSCOPY;  Service:    • CORONARY ANGIOPLASTY WITH STENT PLACEMENT     • CORONARY STENT PLACEMENT  6/2013   • ENDOSCOPY N/A 3/15/2017    Procedure: ESOPHAGOGASTRODUODENOSCOPY WITH " BIOPSIES (COLD);  Surgeon: Fam LILLY MD;  Location: [facility] ENDOSCOPY;  Service:    • PROSTATE SURGERY     • PROSTATE SURGERY     • RHINOPLASTY     • RHINOPLASTY     • SINUS SURGERY  1987/1988   • TONSILLECTOMY  1962   • UPPER GASTROINTESTINAL ENDOSCOPY  3/2017     Outpatient Medications Prior to Visit   Medication Sig Dispense Refill   • aspirin  MG tablet Take 1 tablet by mouth Daily. 30 tablet 0   • atorvastatin (LIPITOR) 80 MG tablet TAKE 1 TABLET DAILY 90 tablet 3   • cetirizine (zyrTEC) 10 MG tablet Take 10 mg by mouth As Needed.     • Esomeprazole Magnesium (ESOMEP-EZS PO) 40 mg 2 (Two) Times a Day.     • losartan (COZAAR) 25 MG tablet Take 1 tablet by mouth Daily. 90 tablet 3   • metoprolol succinate XL (TOPROL-XL) 25 MG 24 hr tablet Take 0.5 tablets by mouth Daily. 45 tablet 1   • mometasone (NASONEX) 50 MCG/ACT nasal spray 2 sprays into the nostril(s) as directed by provider Daily. (Patient taking differently: 2 sprays into the nostril(s) as directed by provider As Needed.) 17 g 0   • montelukast (SINGULAIR) 10 MG tablet TAKE 1 TABLET EVERY NIGHT 90 tablet 0     No facility-administered medications prior to visit.        Allergies as of 01/22/2020 - Reviewed 01/22/2020   Allergen Reaction Noted   • Lisinopril Cough 07/30/2019     Social History     Socioeconomic History   • Marital status:      Spouse name: Not on file   • Number of children: Not on file   • Years of education: Not on file   • Highest education level: Not on file   Tobacco Use   • Smoking status: Never Smoker   • Smokeless tobacco: Never Used   • Tobacco comment: caffeine use   Substance and Sexual Activity   • Alcohol use: Yes     Alcohol/week: 1.0 standard drinks     Types: 1 Cans of beer per week   • Drug use: No   • Sexual activity: Not Currently     Partners: Female     Birth control/protection: Surgical     Comment: Vasectomy, Prostatectomy   Social History Narrative    Pt lives with his wife. He has 2  "children, ages 20 and 32. He is a retired . He has a grandson that stays once weekly with him.      Family History   Problem Relation Age of Onset   • Heart disease Mother    • Hypertension Mother    • Colon cancer Mother          due to colon cancer   • Cancer Mother         colon   • Hearing loss Mother    • Heart disease Father    • Hypertension Father    • Other Father         CABG, heart valve replacement   • Coronary artery disease Father    • Heart failure Father    • Cancer Father         prostate   • Glaucoma Father    • Cancer Sister         Breast   • Hypertension Sister    • Colon polyps Sister         Recent polyp remival   • Cancer Sister         Breast   • Hypertension Sister      Review of Systems   Constitution: Negative for fever, malaise/fatigue, weight gain and weight loss.   HENT: Negative for ear pain, hearing loss, nosebleeds and sore throat.    Eyes: Negative for double vision, pain, vision loss in left eye and vision loss in right eye.   Cardiovascular:        See history of present illness.   Respiratory: Negative for cough, shortness of breath, sleep disturbances due to breathing, snoring and wheezing.    Endocrine: Negative for cold intolerance, heat intolerance and polyuria.   Skin: Negative for itching, poor wound healing and rash.   Musculoskeletal: Negative for joint pain, joint swelling and myalgias.   Gastrointestinal: Negative for abdominal pain, diarrhea, hematochezia, nausea and vomiting.   Genitourinary: Negative for hematuria and hesitancy.   Neurological: Negative for numbness, paresthesias and seizures.   Psychiatric/Behavioral: Negative for depression. The patient is nervous/anxious.         Objective:     Vitals:    20 1212   BP: 164/84   Pulse: 50   Weight: 74.8 kg (165 lb)   Height: 172.7 cm (68\")     Body mass index is 25.09 kg/m².    Physical Exam   Constitutional: He is oriented to person, place, and time. He appears well-developed " and well-nourished.   HENT:   Head: Normocephalic.   Nose: Nose normal.   Mouth/Throat: Oropharynx is clear and moist.   Eyes: Pupils are equal, round, and reactive to light. Conjunctivae and EOM are normal. Right eye exhibits no discharge. No scleral icterus.   Neck: Normal range of motion. Neck supple. No JVD present. No thyromegaly present.   Cardiovascular: Normal rate, regular rhythm, normal heart sounds and intact distal pulses. Exam reveals no gallop and no friction rub.   No murmur heard.  Pulses:       Carotid pulses are 2+ on the right side, and 2+ on the left side.       Radial pulses are 2+ on the right side, and 2+ on the left side.        Femoral pulses are 2+ on the right side, and 2+ on the left side.       Popliteal pulses are 2+ on the right side, and 2+ on the left side.        Dorsalis pedis pulses are 2+ on the right side, and 2+ on the left side.        Posterior tibial pulses are 2+ on the right side, and 2+ on the left side.   Pulmonary/Chest: Effort normal and breath sounds normal. No respiratory distress. He has no wheezes. He has no rales.   Abdominal: Soft. Bowel sounds are normal. He exhibits no distension. There is no hepatosplenomegaly. There is no tenderness. There is no rebound.   Musculoskeletal: Normal range of motion. He exhibits no edema or tenderness.   Neurological: He is alert and oriented to person, place, and time.   Skin: Skin is warm and dry. No rash noted. No erythema.   Psychiatric: He has a normal mood and affect. His behavior is normal. Judgment and thought content normal.   Vitals reviewed.    Lab Review:     ECG 12 Lead  Date/Time: 1/22/2020 12:25 PM  Performed by: Kihsa Berman MD  Authorized by: Kisha Berman MD   Comparison: compared with previous ECG   Similar to previous ECG  Rhythm: sinus rhythm    Clinical impression: normal ECG          Assessment:       Diagnosis Plan   1. Coronary artery disease involving native coronary artery of native heart without angina  pectoris  ECG 12 Lead   2. Thoracic aortic aneurysm without rupture (CMS/HCC)  MRI Angiogram Chest With Contrast   3. Hypertension, essential     4. Aortic valve stenosis, mild       Plan:       1.  Coronary artery disease with history of prior stenting and negative stress test in June 2016.  No anginal symptoms at this time.  Follow-up in 6 months at which time we will consider treadmill access stress test.  Will decrease aspirin to 160 mg day given worsening reflux symptoms  2.  Mildly dilated ascending aorta, stable on CT in September 2018.  We will check MR angiogram of the chest.  3.  Mild aortic valve stenosis.  Clinically remains mild.  Plan on follow-up echocardiographic imaging in 6 months to a year  4.  Paresthesias.  Recommended he see neurology.  He has mixed feelings about this and will consider this further and call if he changes his mind or discuss further with GARY Ochoa  5.  Hyperlipidemia.  Controlled  6.  Reflux symptoms.  He will decrease aspirin as above and pursue GI evaluation is pending.  7.  Hemorrhoids.  He is quite troubled with this but plans to address further with GI.       Your medication list           Accurate as of January 22, 2020 11:59 PM. If you have any questions, ask your nurse or doctor.               CHANGE how you take these medications      Instructions Last Dose Given Next Dose Due   mometasone 50 MCG/ACT nasal spray  Commonly known as:  NASONEX  What changed:    · when to take this  · reasons to take this      2 sprays into the nostril(s) as directed by provider Daily.          CONTINUE taking these medications      Instructions Last Dose Given Next Dose Due   aspirin  MG tablet      Take 1 tablet by mouth Daily.       atorvastatin 80 MG tablet  Commonly known as:  LIPITOR      TAKE 1 TABLET DAILY       cetirizine 10 MG tablet  Commonly known as:  zyrTEC      Take 10 mg by mouth As Needed.       ESOMEP-EZS PO      40 mg 2 (Two) Times a Day.       losartan 25 MG  tablet  Commonly known as:  COZAAR      Take 1 tablet by mouth Daily.       metoprolol succinate XL 25 MG 24 hr tablet  Commonly known as:  TOPROL-XL      Take 0.5 tablets by mouth Daily.       montelukast 10 MG tablet  Commonly known as:  SINGULAIR      TAKE 1 TABLET EVERY NIGHT              Patient is no longer taking -.  I corrected the med list to reflect this.  I did not stop these medications.    Dictated utilizing Dragon dictation

## 2020-01-30 RX ORDER — METOPROLOL SUCCINATE 25 MG/1
TABLET, EXTENDED RELEASE ORAL
Qty: 45 TABLET | Refills: 1 | Status: SHIPPED | OUTPATIENT
Start: 2020-01-30 | End: 2020-09-01

## 2020-01-31 ENCOUNTER — ANESTHESIA EVENT (OUTPATIENT)
Dept: GASTROENTEROLOGY | Facility: HOSPITAL | Age: 65
End: 2020-01-31

## 2020-01-31 ENCOUNTER — HOSPITAL ENCOUNTER (OUTPATIENT)
Facility: HOSPITAL | Age: 65
Setting detail: HOSPITAL OUTPATIENT SURGERY
Discharge: HOME | End: 2020-01-31
Attending: INTERNAL MEDICINE | Admitting: INTERNAL MEDICINE

## 2020-01-31 ENCOUNTER — ANESTHESIA (OUTPATIENT)
Dept: GASTROENTEROLOGY | Facility: HOSPITAL | Age: 65
End: 2020-01-31

## 2020-01-31 VITALS
TEMPERATURE: 98.3 F | SYSTOLIC BLOOD PRESSURE: 117 MMHG | RESPIRATION RATE: 16 BRPM | WEIGHT: 164.2 LBS | OXYGEN SATURATION: 97 % | DIASTOLIC BLOOD PRESSURE: 75 MMHG | BODY MASS INDEX: 24.89 KG/M2 | HEIGHT: 68 IN | HEART RATE: 56 BPM

## 2020-01-31 DIAGNOSIS — R09.89 GLOBUS SENSATION: ICD-10-CM

## 2020-01-31 DIAGNOSIS — K21.9 GASTROESOPHAGEAL REFLUX DISEASE, ESOPHAGITIS PRESENCE NOT SPECIFIED: ICD-10-CM

## 2020-01-31 DIAGNOSIS — Z80.0 FH: COLON CANCER IN FIRST DEGREE RELATIVE <60 YEARS OLD: ICD-10-CM

## 2020-01-31 DIAGNOSIS — D12.6 ADENOMATOUS POLYP OF COLON, UNSPECIFIED PART OF COLON: ICD-10-CM

## 2020-01-31 DIAGNOSIS — R13.10 DYSPHAGIA, UNSPECIFIED TYPE: ICD-10-CM

## 2020-01-31 PROCEDURE — 43239 EGD BIOPSY SINGLE/MULTIPLE: CPT | Performed by: INTERNAL MEDICINE

## 2020-01-31 PROCEDURE — 45380 COLONOSCOPY AND BIOPSY: CPT | Performed by: INTERNAL MEDICINE

## 2020-01-31 PROCEDURE — 87081 CULTURE SCREEN ONLY: CPT | Performed by: INTERNAL MEDICINE

## 2020-01-31 PROCEDURE — S0260 H&P FOR SURGERY: HCPCS | Performed by: INTERNAL MEDICINE

## 2020-01-31 PROCEDURE — 88305 TISSUE EXAM BY PATHOLOGIST: CPT | Performed by: INTERNAL MEDICINE

## 2020-01-31 PROCEDURE — 25010000002 PROPOFOL 10 MG/ML EMULSION: Performed by: NURSE ANESTHETIST, CERTIFIED REGISTERED

## 2020-01-31 PROCEDURE — 45385 COLONOSCOPY W/LESION REMOVAL: CPT | Performed by: INTERNAL MEDICINE

## 2020-01-31 RX ORDER — SODIUM CHLORIDE, SODIUM LACTATE, POTASSIUM CHLORIDE, CALCIUM CHLORIDE 600; 310; 30; 20 MG/100ML; MG/100ML; MG/100ML; MG/100ML
1000 INJECTION, SOLUTION INTRAVENOUS CONTINUOUS
Status: DISCONTINUED | OUTPATIENT
Start: 2020-01-31 | End: 2020-01-31 | Stop reason: HOSPADM

## 2020-01-31 RX ORDER — GLYCOPYRROLATE 0.2 MG/ML
INJECTION INTRAMUSCULAR; INTRAVENOUS AS NEEDED
Status: DISCONTINUED | OUTPATIENT
Start: 2020-01-31 | End: 2020-01-31 | Stop reason: SURG

## 2020-01-31 RX ORDER — LIDOCAINE HYDROCHLORIDE 20 MG/ML
INJECTION, SOLUTION INFILTRATION; PERINEURAL AS NEEDED
Status: DISCONTINUED | OUTPATIENT
Start: 2020-01-31 | End: 2020-01-31 | Stop reason: SURG

## 2020-01-31 RX ORDER — PROPOFOL 10 MG/ML
VIAL (ML) INTRAVENOUS CONTINUOUS PRN
Status: DISCONTINUED | OUTPATIENT
Start: 2020-01-31 | End: 2020-01-31 | Stop reason: SURG

## 2020-01-31 RX ORDER — PROPOFOL 10 MG/ML
VIAL (ML) INTRAVENOUS AS NEEDED
Status: DISCONTINUED | OUTPATIENT
Start: 2020-01-31 | End: 2020-01-31 | Stop reason: SURG

## 2020-01-31 RX ORDER — SODIUM CHLORIDE 0.9 % (FLUSH) 0.9 %
10 SYRINGE (ML) INJECTION AS NEEDED
Status: DISCONTINUED | OUTPATIENT
Start: 2020-01-31 | End: 2020-01-31 | Stop reason: HOSPADM

## 2020-01-31 RX ORDER — LIDOCAINE HYDROCHLORIDE 10 MG/ML
0.5 INJECTION, SOLUTION INFILTRATION; PERINEURAL ONCE AS NEEDED
Status: DISCONTINUED | OUTPATIENT
Start: 2020-01-31 | End: 2020-01-31 | Stop reason: HOSPADM

## 2020-01-31 RX ADMIN — LIDOCAINE HYDROCHLORIDE 60 MG: 20 INJECTION, SOLUTION INFILTRATION; PERINEURAL at 09:25

## 2020-01-31 RX ADMIN — PROPOFOL 140 MCG/KG/MIN: 10 INJECTION, EMULSION INTRAVENOUS at 09:26

## 2020-01-31 RX ADMIN — PROPOFOL 150 MG: 10 INJECTION, EMULSION INTRAVENOUS at 09:25

## 2020-01-31 RX ADMIN — GLYCOPYRROLATE 0.2 MCG: 0.2 INJECTION INTRAMUSCULAR; INTRAVENOUS at 09:21

## 2020-01-31 RX ADMIN — SODIUM CHLORIDE, POTASSIUM CHLORIDE, SODIUM LACTATE AND CALCIUM CHLORIDE 1000 ML: 600; 310; 30; 20 INJECTION, SOLUTION INTRAVENOUS at 08:52

## 2020-01-31 NOTE — ANESTHESIA POSTPROCEDURE EVALUATION
Patient: Chino Huber    Procedure Summary     Date:  01/31/20 Room / Location:   [facility] /  [facility]    Anesthesia Start:  0919 Anesthesia Stop:  0957    Procedures:       ESOPHAGOGASTRODUODENOSCOPY WITH BIOPSIES (N/A Esophagus)      COLONOSCOPY TO TI  WITH POLYPECTOMY (COLD SNARE) (N/A ) Diagnosis:       Esophagitis      Gastritis      Diverticulosis      Colon polyps      Hemorrhoids      (Gastroesophageal reflux disease, esophagitis presence not specified [K21.9])      (Globus sensation [R09.89])      (Dysphagia, unspecified type [R13.10])      (Adenomatous polyp of colon, unspecified part of colon [D12.6])      (FH: colon cancer in first degree relative <60 years old [Z80.0])    Surgeon:  Fam Rose MD Provider:  Tawny Queen MD    Anesthesia Type:  MAC ASA Status:  3          Anesthesia Type: MAC    Vitals  Vitals Value Taken Time   /75 1/31/2020 10:20 AM   Temp     Pulse 56 1/31/2020 10:20 AM   Resp 16 1/31/2020 10:20 AM   SpO2 97 % 1/31/2020 10:20 AM           Post Anesthesia Care and Evaluation    Patient location during evaluation: PACU  Patient participation: complete - patient participated  Level of consciousness: awake and alert  Pain management: adequate  Airway patency: patent  Anesthetic complications: No anesthetic complications  PONV Status: none  Cardiovascular status: acceptable  Respiratory status: acceptable  Hydration status: acceptable

## 2020-01-31 NOTE — ANESTHESIA PREPROCEDURE EVALUATION
Anesthesia Evaluation     Patient summary reviewed and Nursing notes reviewed   history of anesthetic complications: PONV  NPO Solid Status: > 8 hours  NPO Liquid Status: > 2 hours           Airway   Mallampati: II  TM distance: <3 FB  Neck ROM: full  Possible difficult intubation  Dental - normal exam     Pulmonary - negative pulmonary ROS and normal exam    breath sounds clear to auscultation  Cardiovascular - normal exam    ECG reviewed  Rhythm: regular  Rate: normal    (+) hypertension 2 medications or greater, valvular problems/murmurs (bicuspid aortic valve) AS, CAD, cardiac stents hyperlipidemia,     ROS comment: Thoracic aortic aneurysm    Neuro/Psych- negative ROS  GI/Hepatic/Renal/Endo    (+)  GERD,    (-) hepatitis, liver disease    Musculoskeletal     (-) neck pain  Abdominal  - normal exam   Substance History - negative use     OB/GYN negative ob/gyn ROS         Other      history of cancer (prostate) remission                    Anesthesia Plan    ASA 3     MAC     intravenous induction     Anesthetic plan, all risks, benefits, and alternatives have been provided, discussed and informed consent has been obtained with: patient.

## 2020-02-01 LAB — UREASE TISS QL: NEGATIVE

## 2020-02-03 ENCOUNTER — HOSPITAL ENCOUNTER (OUTPATIENT)
Dept: MRI IMAGING | Facility: HOSPITAL | Age: 65
Discharge: HOME | End: 2020-02-03
Admitting: INTERNAL MEDICINE

## 2020-02-03 DIAGNOSIS — I71.20 THORACIC AORTIC ANEURYSM WITHOUT RUPTURE (HCC): ICD-10-CM

## 2020-02-03 LAB
CREAT BLDA-MCNC: 0.9 MG/DL (ref 0.6–1.3)
CYTO UR: NORMAL
LAB AP CASE REPORT: NORMAL
PATH REPORT.FINAL DX SPEC: NORMAL
PATH REPORT.GROSS SPEC: NORMAL

## 2020-02-03 PROCEDURE — 82565 ASSAY OF CREATININE: CPT

## 2020-02-03 PROCEDURE — 0 GADOBENATE DIMEGLUMINE 529 MG/ML SOLUTION: Performed by: INTERNAL MEDICINE

## 2020-02-03 PROCEDURE — C8909 MRA W/CONT, CHEST: HCPCS

## 2020-02-03 PROCEDURE — A9577 INJ MULTIHANCE: HCPCS | Performed by: INTERNAL MEDICINE

## 2020-02-03 RX ADMIN — GADOBENATE DIMEGLUMINE 15 ML: 529 INJECTION, SOLUTION INTRAVENOUS at 14:00

## 2020-02-10 ENCOUNTER — TELEPHONE (OUTPATIENT)
Dept: CARDIOLOGY | Facility: CLINIC | Age: 65
End: 2020-02-10

## 2020-02-12 ENCOUNTER — TELEPHONE (OUTPATIENT)
Dept: GASTROENTEROLOGY | Facility: CLINIC | Age: 65
End: 2020-02-12

## 2020-02-12 NOTE — TELEPHONE ENCOUNTER
Call to pt.  Advise per path report that duodenal bx unremarkable.  GE junction with nonspecific gastritis.  Esophageal bx with evidence of Chawla's.    First polyp that was removed was not cancerous and not precancerous.  Remaining polyps removed were not cancerous, but precancerous.     Advise per Dr Rose that recommend continue ppi.  F/u EGD in 2 yrs;  f/u c/s in 3 yrs;  office f/u annually or sooner as needed.  Verb understanding.     EGD for 1/31/22, c/s for 1/31/23 and o/v for 1/31/21 placed in recall.

## 2020-02-12 NOTE — TELEPHONE ENCOUNTER
I talked to patient regarding MRI results.  Continue with plans for repeat MR angiogram next year of the chest using baseline a sending aortic size of 3.7 cm.  Patient also notes that blood pressure has improved with pressures in the 120s over 80.  I told him to call if it is any higher.    Georgina, this patient was supposed to see APRN in 6 months from the last visit but is scheduled with me instead.  Can you tell me why?.  It needs to be changed to APRN for 6-month visit and me in 1 year.[email or web address]

## 2020-02-12 NOTE — TELEPHONE ENCOUNTER
----- Message from Fam LILLY MD sent at 2/7/2020  5:41 PM EST -----  Regarding: Biopsy results  Okay to call results, recommend continue PPI.  He warrants follow-up EGD in 2 years time.  Would also offer follow-up colonoscopy in 3 years time.  He can follow-up in the office annually or sooner as needed.  ----- Message -----  From: Lab, Background User  Sent: 2/1/2020  10:39 AM EST  To: Fam LILLY MD

## 2020-03-03 ENCOUNTER — TELEPHONE (OUTPATIENT)
Dept: INTERNAL MEDICINE | Facility: CLINIC | Age: 65
End: 2020-03-03

## 2020-03-03 NOTE — TELEPHONE ENCOUNTER
Patient called in stating he received a bill from [employer] for 86.80 but it should have been paid at 100% by insurance. He states his insurance says he needs an E code for a diagnosis to go with these lab draws to have them covered. Collection date was 12/2/19. Diagnosis codes that were used already are K21.9-Gerd, R10.13-Epigastric pain, is there any other code we can add for patient? Please advise.

## 2020-03-03 NOTE — TELEPHONE ENCOUNTER
What are the E codes? He was seen for an acute appointment that day for the diagnosis already used for billing.

## 2020-03-10 NOTE — TELEPHONE ENCOUNTER
Spoke with patient, letting him know there is no other codes we can use at this time, he is calling insurance company to see what they recommend to use to have this covered.

## 2020-03-10 NOTE — TELEPHONE ENCOUNTER
Patient called back stating insurance will not cover the test with any diagnosis so he is responsible for paying the bill, patient understands and there is nothing further to do with this code

## 2020-05-14 LAB
ALBUMIN SERPL-MCNC: 4.5 G/DL (ref 3.5–5.2)
ALBUMIN/GLOB SERPL: 2 G/DL
ALP SERPL-CCNC: 108 U/L (ref 39–117)
ALT SERPL-CCNC: 28 U/L (ref 1–41)
AST SERPL-CCNC: 15 U/L (ref 1–40)
BILIRUB SERPL-MCNC: 0.5 MG/DL (ref 0.2–1.2)
BUN SERPL-MCNC: 17 MG/DL (ref 8–23)
BUN/CREAT SERPL: 14.7 (ref 7–25)
CALCIUM SERPL-MCNC: 9.7 MG/DL (ref 8.6–10.5)
CHLORIDE SERPL-SCNC: 101 MMOL/L (ref 98–107)
CHOLEST SERPL-MCNC: 132 MG/DL (ref 0–200)
CO2 SERPL-SCNC: 25.9 MMOL/L (ref 22–29)
CREAT SERPL-MCNC: 1.16 MG/DL (ref 0.76–1.27)
GLOBULIN SER CALC-MCNC: 2.3 GM/DL
GLUCOSE SERPL-MCNC: 92 MG/DL (ref 65–99)
HDLC SERPL-MCNC: 43 MG/DL (ref 40–60)
LDLC SERPL CALC-MCNC: 55 MG/DL (ref 0–100)
POTASSIUM SERPL-SCNC: 4.2 MMOL/L (ref 3.5–5.2)
PROT SERPL-MCNC: 6.8 G/DL (ref 6–8.5)
PSA SERPL-MCNC: 0.09 NG/ML (ref 0–4)
SODIUM SERPL-SCNC: 140 MMOL/L (ref 136–145)
TRIGL SERPL-MCNC: 172 MG/DL (ref 0–150)
TSH SERPL DL<=0.005 MIU/L-ACNC: 3.94 UIU/ML (ref 0.27–4.2)
VLDLC SERPL CALC-MCNC: 34.4 MG/DL

## 2020-05-18 DIAGNOSIS — Z91.09 ENVIRONMENTAL ALLERGIES: ICD-10-CM

## 2020-05-18 RX ORDER — MONTELUKAST SODIUM 10 MG/1
TABLET ORAL
Qty: 90 TABLET | Refills: 0 | Status: SHIPPED | OUTPATIENT
Start: 2020-05-18 | End: 2021-09-08

## 2020-05-20 ENCOUNTER — OFFICE VISIT (OUTPATIENT)
Dept: INTERNAL MEDICINE | Facility: CLINIC | Age: 65
End: 2020-05-20

## 2020-05-20 VITALS
DIASTOLIC BLOOD PRESSURE: 88 MMHG | BODY MASS INDEX: 26.06 KG/M2 | WEIGHT: 166 LBS | TEMPERATURE: 96.4 F | OXYGEN SATURATION: 97 % | HEART RATE: 53 BPM | RESPIRATION RATE: 16 BRPM | SYSTOLIC BLOOD PRESSURE: 158 MMHG | HEIGHT: 67 IN

## 2020-05-20 DIAGNOSIS — R05.8 COUGH DUE TO ACE INHIBITOR: ICD-10-CM

## 2020-05-20 DIAGNOSIS — I10 HYPERTENSION, ESSENTIAL: ICD-10-CM

## 2020-05-20 DIAGNOSIS — T46.4X5A COUGH DUE TO ACE INHIBITOR: ICD-10-CM

## 2020-05-20 DIAGNOSIS — B37.89 CANDIDA RASH OF GROIN: ICD-10-CM

## 2020-05-20 DIAGNOSIS — Z00.00 HEALTHCARE MAINTENANCE: Primary | ICD-10-CM

## 2020-05-20 PROBLEM — K59.00 CONSTIPATION: Status: RESOLVED | Noted: 2017-01-12 | Resolved: 2020-05-20

## 2020-05-20 PROCEDURE — 99396 PREV VISIT EST AGE 40-64: CPT | Performed by: NURSE PRACTITIONER

## 2020-05-20 RX ORDER — ASPIRIN 81 MG/1
81 TABLET, CHEWABLE ORAL 2 TIMES DAILY
COMMUNITY

## 2020-05-20 RX ORDER — FLUCONAZOLE 200 MG/1
200 TABLET ORAL DAILY
Qty: 14 TABLET | Refills: 0 | Status: SHIPPED | OUTPATIENT
Start: 2020-05-20 | End: 2020-06-17

## 2020-05-20 RX ORDER — LOSARTAN POTASSIUM 50 MG/1
50 TABLET ORAL DAILY
Qty: 90 TABLET | Refills: 3 | Status: SHIPPED | OUTPATIENT
Start: 2020-05-20 | End: 2020-06-17 | Stop reason: ALTCHOICE

## 2020-05-20 NOTE — PROGRESS NOTES
Annual Exam (UTD w/cardio ); Hypertension; Hyperlipidemia; Coronary Artery Disease; and Heartburn      Chino Huber is being seen for a Complete physical exam. His last physical was > 1 year ago.     Social: He is a retired , and flys recreationally. He is . His household includes wife. He has 2 adult children.     Lifestyle: He is exercising regularly, walks 2 miles daily. He does not use tobacco. He drinks alcohol once wekly.    Screening: Colonoscopy was completed 1- with Dr. Rose. Last labs reviewed from 5- included a lipid panel, PSA, CBC, and CMP.     He has history of CAD with stent placement, GERD, and prostate cancer. He is followed by Dr. Martinez (urology) and Dr. Berman (cardio). -138/80s    History of Present Illness       The following portions of the patient's history were reviewed and updated as appropriate: allergies, current medications, past family history, past medical history, past social history, past surgical history and problem list.    Review of Systems   Constitutional: Negative.  Negative for fever and unexpected weight change.   HENT: Negative.    Eyes: Negative.    Respiratory: Negative.  Negative for shortness of breath and stridor.    Cardiovascular: Negative.  Negative for chest pain, palpitations and leg swelling.   Gastrointestinal: Negative.    Endocrine: Negative.    Genitourinary: Negative.    Musculoskeletal: Positive for arthralgias.   Skin: Positive for rash (jock itch).   Allergic/Immunologic: Negative.  Negative for environmental allergies, food allergies and immunocompromised state.   Neurological: Negative.  Negative for dizziness.   Hematological: Negative.  Negative for adenopathy. Does not bruise/bleed easily.   Psychiatric/Behavioral: Negative.  Negative for agitation, behavioral problems, confusion and decreased concentration.       Objective          /88 (BP Location: Left arm, Patient Position: Sitting, Cuff Size:  "Adult)   Pulse 53   Temp 96.4 °F (35.8 °C) (Temporal)   Resp 16   Ht 170.2 cm (67\")   Wt 75.3 kg (166 lb)   SpO2 97%   BMI 26.00 kg/m²     General Appearance:    Alert, cooperative, no distress, appears stated age   Head:    Normocephalic, without obvious abnormality, atraumatic   Eyes:    PERRL, conjunctiva/corneas clear, EOM's intact, fundi     benign, both eyes        Ears:    Normal TM's and external ear canals, both ears   Nose:   Nares normal, septum midline, mucosa normal, no drainage    or sinus tenderness   Throat:   Lips, mucosa, and tongue normal; teeth and gums normal   Neck:   Supple, symmetrical, trachea midline, no adenopathy;        thyroid:  No enlargement/tenderness/nodules; no carotid    bruit or JVD   Back:     Symmetric, no curvature, ROM normal, no CVA tenderness   Lungs:     Clear to auscultation bilaterally, respirations unlabored   Chest wall:    No tenderness or deformity   Heart:    Regular rate, Bradycardic, Grade 2/6 systolic murmur heard over aortic region   Abdomen:     Soft, non-tender, bowel sounds active all four quadrants,     no masses, no organomegaly   Genitalia:    Normal male without lesion, discharge or tenderness   Rectal:    Normal tone, normal prostate, no masses or tenderness;   Extremities:   Extremities normal, atraumatic, no cyanosis or edema   Pulses:   2+ and symmetric all extremities   Skin:   Skin color, texture, turgor normal, no rashes or lesions   Lymph nodes:   Cervical, supraclavicular, and axillary nodes normal   Neurologic:   CNII-XII intact. Normal strength, sensation and reflexes      throughout              Assessment/Plan   Chino was seen today for annual exam, hypertension, hyperlipidemia, coronary artery disease and heartburn.    Diagnoses and all orders for this visit:    Healthcare maintenance    Hypertension, essential  -     losartan (COZAAR) 50 MG tablet; Take 1 tablet by mouth Daily.    Candida rash of groin  -     fluconazole (Diflucan) " 200 MG tablet; Take 1 tablet by mouth Daily.    Cough due to ACE inhibitor  -     losartan (COZAAR) 50 MG tablet; Take 1 tablet by mouth Daily.        Preventive counseling: Resume exercise regimen, walking. Increase Losartan to 50 mg daily. BP goal 135/85. Immunizations up to date. Cancer screenings reviewed and updated.     Follow up 4 weeks

## 2020-06-17 ENCOUNTER — TELEMEDICINE (OUTPATIENT)
Dept: INTERNAL MEDICINE | Facility: CLINIC | Age: 65
End: 2020-06-17

## 2020-06-17 VITALS — DIASTOLIC BLOOD PRESSURE: 68 MMHG | SYSTOLIC BLOOD PRESSURE: 152 MMHG

## 2020-06-17 DIAGNOSIS — I10 HYPERTENSION, ESSENTIAL: ICD-10-CM

## 2020-06-17 DIAGNOSIS — R05.8 COUGH DUE TO ACE INHIBITOR: ICD-10-CM

## 2020-06-17 DIAGNOSIS — T46.4X5A COUGH DUE TO ACE INHIBITOR: ICD-10-CM

## 2020-06-17 PROCEDURE — 99213 OFFICE O/P EST LOW 20 MIN: CPT | Performed by: NURSE PRACTITIONER

## 2020-06-17 RX ORDER — LOSARTAN POTASSIUM AND HYDROCHLOROTHIAZIDE 12.5; 5 MG/1; MG/1
1 TABLET ORAL DAILY
Qty: 90 TABLET | Refills: 3 | Status: SHIPPED | OUTPATIENT
Start: 2020-06-17 | End: 2020-12-14 | Stop reason: SDUPTHER

## 2020-06-17 NOTE — PROGRESS NOTES
HTN    Subjective     Chino Huber is a 64 y.o. male being seen for a follow up appointment today regarding HTN. He was in the office 4 weeks ago for a CPE . He is checking his BP at home, running 150/68. He denies CP, HANS, edema. He is not exercsing regularly. He is not exercising regularly.       History of Present Illness     Allergies   Allergen Reactions   • Lisinopril Cough         Current Outpatient Medications:   •  aspirin 81 MG chewable tablet, Chew 81 mg 2 (Two) Times a Day., Disp: , Rfl:   •  atorvastatin (LIPITOR) 80 MG tablet, TAKE 1 TABLET DAILY, Disp: 90 tablet, Rfl: 3  •  cetirizine (zyrTEC) 10 MG tablet, Take 10 mg by mouth As Needed., Disp: , Rfl:   •  Esomeprazole Magnesium (ESOMEP-EZS PO), 40 mg 2 (Two) Times a Day., Disp: , Rfl:   •  fluconazole (Diflucan) 200 MG tablet, Take 1 tablet by mouth Daily., Disp: 14 tablet, Rfl: 0  •  losartan (COZAAR) 50 MG tablet, Take 1 tablet by mouth Daily., Disp: 90 tablet, Rfl: 3  •  metoprolol succinate XL (TOPROL-XL) 25 MG 24 hr tablet, TAKE 1/2 TABLET DAILY., Disp: 45 tablet, Rfl: 1  •  mometasone (NASONEX) 50 MCG/ACT nasal spray, 2 sprays into the nostril(s) as directed by provider Daily. (Patient taking differently: 2 sprays into the nostril(s) as directed by provider As Needed.), Disp: 17 g, Rfl: 0  •  montelukast (SINGULAIR) 10 MG tablet, TAKE 1 TABLET EVERY NIGHT, Disp: 90 tablet, Rfl: 0    The following portions of the patient's history were reviewed and updated as appropriate: allergies, current medications, past family history, past medical history, past social history, past surgical history and problem list.    Review of Systems   Constitutional: Negative.    HENT: Negative.    Eyes: Negative.    Respiratory: Negative.  Negative for shortness of breath, wheezing and stridor.    Cardiovascular: Negative.  Negative for chest pain, palpitations and leg swelling.   Gastrointestinal: Negative.    Endocrine: Negative.    Genitourinary: Negative.     Musculoskeletal: Negative.  Negative for arthralgias and back pain.   Skin: Negative.        Assessment     Physical Exam   Constitutional: He is oriented to person, place, and time. He appears well-developed.   Neurological: He is alert and oriented to person, place, and time.   Skin: Skin is warm.   Psychiatric: He has a normal mood and affect.       Kenton Magana was seen today for hypertension.    Diagnoses and all orders for this visit:    Hypertension, essential    Cough due to ACE inhibitor    Other orders  -     losartan-hydrochlorothiazide (Hyzaar) 50-12.5 MG per tablet; Take 1 tablet by mouth Daily.      Stop Losartan 50mg, change to Losartan 50/12.5mg daily for HTN    Follow up in 6 months with labs

## 2020-06-30 ENCOUNTER — TELEPHONE (OUTPATIENT)
Dept: INTERNAL MEDICINE | Facility: CLINIC | Age: 65
End: 2020-06-30

## 2020-06-30 NOTE — TELEPHONE ENCOUNTER
June 24th - Message left [facility] to schedule these appointments.    ----- Message from GARY Graves sent at 6/17/2020  1:29 PM EDT -----  Regarding: Appt and labs  Needs a Follow up with labs in 6 months, 30 minute

## 2020-07-20 DIAGNOSIS — R21 RASH OF GROIN: Primary | ICD-10-CM

## 2020-07-29 ENCOUNTER — OFFICE VISIT (OUTPATIENT)
Dept: CARDIOLOGY | Facility: CLINIC | Age: 65
End: 2020-07-29

## 2020-07-29 VITALS
DIASTOLIC BLOOD PRESSURE: 80 MMHG | BODY MASS INDEX: 25.01 KG/M2 | HEIGHT: 68 IN | HEART RATE: 81 BPM | WEIGHT: 165 LBS | SYSTOLIC BLOOD PRESSURE: 124 MMHG

## 2020-07-29 DIAGNOSIS — I10 HYPERTENSION, ESSENTIAL: ICD-10-CM

## 2020-07-29 DIAGNOSIS — I71.10 RUPTURED ANEURYSM OF THORACIC AORTA (HCC): ICD-10-CM

## 2020-07-29 DIAGNOSIS — I71.20 THORACIC AORTIC ANEURYSM WITHOUT RUPTURE (HCC): ICD-10-CM

## 2020-07-29 DIAGNOSIS — I35.0 NONRHEUMATIC AORTIC VALVE STENOSIS: ICD-10-CM

## 2020-07-29 DIAGNOSIS — I25.10 CORONARY ARTERY DISEASE INVOLVING NATIVE CORONARY ARTERY OF NATIVE HEART WITHOUT ANGINA PECTORIS: Primary | ICD-10-CM

## 2020-07-29 PROCEDURE — 99214 OFFICE O/P EST MOD 30 MIN: CPT | Performed by: INTERNAL MEDICINE

## 2020-07-29 PROCEDURE — 93000 ELECTROCARDIOGRAM COMPLETE: CPT | Performed by: INTERNAL MEDICINE

## 2020-08-08 PROBLEM — I71.10 RUPTURED ANEURYSM OF THORACIC AORTA (HCC): Status: ACTIVE | Noted: 2020-08-08

## 2020-08-10 ENCOUNTER — PATIENT MESSAGE (OUTPATIENT)
Dept: CARDIOLOGY | Facility: CLINIC | Age: 65
End: 2020-08-10

## 2020-08-11 ENCOUNTER — TELEPHONE (OUTPATIENT)
Dept: CARDIOLOGY | Facility: CLINIC | Age: 65
End: 2020-08-11

## 2020-08-11 NOTE — TELEPHONE ENCOUNTER
Also pt said that his Stress ECHO is not scheduled either.      Scheduling- Please look into this.    Akilah- Can you help with this?

## 2020-08-11 NOTE — TELEPHONE ENCOUNTER
Eliana--  it looks like Dr. Berman read this EKG.  He had some slight changes that she thought could possibly be from lead placement.  The EKG was considered abnormal.  Would have to take this up with Dr. Berman to see if she feels that this could be changed but that would be up to her.      Nimesh-- please call patient to schedule stress echo to be done within the next couple of weeks.

## 2020-08-11 NOTE — TELEPHONE ENCOUNTER
----- Message from Chino Huber sent at 8/10/2020  7:55 PM EDT -----  Regarding: Test Results Question  Contact: [phone]  I have a question about ECG 12-LEAD resulted on 7/29/20, 10:20 AM.    The ecg lead was misplaced, so you deemed the ecg abnormal?  Why is this so?  I would think you'd deem it inconclusive or ask for a re-do. I don't like my record reflecting inaccurate information.

## 2020-08-12 NOTE — TELEPHONE ENCOUNTER
Please let the pt know alternate lead placement can also mean how he was laying down. I cannot change the record but if he wants to redo this he can and I can addend the note. Why is the stress echo at [facility]? Should be done here. Is there some policy change??

## 2020-08-12 NOTE — TELEPHONE ENCOUNTER
Pt called back and would like EKG only before his stree appt on the 24th.  This is ok with Dr Berman.  Please call pt with appt time to arrive.

## 2020-08-24 ENCOUNTER — CLINICAL SUPPORT (OUTPATIENT)
Dept: CARDIOLOGY | Facility: CLINIC | Age: 65
End: 2020-08-24

## 2020-08-24 ENCOUNTER — HOSPITAL ENCOUNTER (OUTPATIENT)
Dept: CARDIOLOGY | Facility: HOSPITAL | Age: 65
Discharge: HOME | End: 2020-08-24
Admitting: INTERNAL MEDICINE

## 2020-08-24 VITALS
SYSTOLIC BLOOD PRESSURE: 130 MMHG | OXYGEN SATURATION: 95 % | HEIGHT: 68 IN | BODY MASS INDEX: 25.01 KG/M2 | HEART RATE: 86 BPM | DIASTOLIC BLOOD PRESSURE: 90 MMHG | WEIGHT: 165 LBS

## 2020-08-24 DIAGNOSIS — I35.0 NONRHEUMATIC AORTIC VALVE STENOSIS: Primary | ICD-10-CM

## 2020-08-24 DIAGNOSIS — I35.0 NONRHEUMATIC AORTIC VALVE STENOSIS: ICD-10-CM

## 2020-08-24 DIAGNOSIS — I71.20 THORACIC AORTIC ANEURYSM WITHOUT RUPTURE (HCC): ICD-10-CM

## 2020-08-24 DIAGNOSIS — I25.10 CORONARY ARTERY DISEASE INVOLVING NATIVE CORONARY ARTERY OF NATIVE HEART WITHOUT ANGINA PECTORIS: ICD-10-CM

## 2020-08-24 LAB
ASCENDING AORTA: 3.8 CM
BH CV ECHO MEAS - ACS: 1.2 CM
BH CV ECHO MEAS - AO MAX PG (FULL): 31.4 MMHG
BH CV ECHO MEAS - AO MAX PG: 33.9 MMHG
BH CV ECHO MEAS - AO MEAN PG (FULL): 21.2 MMHG
BH CV ECHO MEAS - AO MEAN PG: 22.9 MMHG
BH CV ECHO MEAS - AO ROOT AREA (BSA CORRECTED): 2.1
BH CV ECHO MEAS - AO ROOT AREA: 11.9 CM^2
BH CV ECHO MEAS - AO ROOT DIAM: 3.9 CM
BH CV ECHO MEAS - AO V2 MAX: 291.2 CM/SEC
BH CV ECHO MEAS - AO V2 MEAN: 230.2 CM/SEC
BH CV ECHO MEAS - AO V2 VTI: 48 CM
BH CV ECHO MEAS - ASC AORTA: 3.8 CM
BH CV ECHO MEAS - AVA(I,A): 1.4 CM^2
BH CV ECHO MEAS - AVA(I,D): 1.4 CM^2
BH CV ECHO MEAS - AVA(V,A): 1 CM^2
BH CV ECHO MEAS - AVA(V,D): 1 CM^2
BH CV ECHO MEAS - BSA(HAYCOCK): 1.9 M^2
BH CV ECHO MEAS - BSA: 1.9 M^2
BH CV ECHO MEAS - BZI_BMI: 25.1 KILOGRAMS/M^2
BH CV ECHO MEAS - BZI_METRIC_HEIGHT: 172.7 CM
BH CV ECHO MEAS - BZI_METRIC_WEIGHT: 74.8 KG
BH CV ECHO MEAS - EDV(MOD-SP2): 68 ML
BH CV ECHO MEAS - EDV(MOD-SP4): 75 ML
BH CV ECHO MEAS - EDV(TEICH): 63.9 ML
BH CV ECHO MEAS - EF(CUBED): 65.9 %
BH CV ECHO MEAS - EF(MOD-BP): 67.7 %
BH CV ECHO MEAS - EF(MOD-SP2): 75 %
BH CV ECHO MEAS - EF(MOD-SP4): 73.3 %
BH CV ECHO MEAS - EF(TEICH): 58.1 %
BH CV ECHO MEAS - ESV(MOD-SP2): 17 ML
BH CV ECHO MEAS - ESV(MOD-SP4): 20 ML
BH CV ECHO MEAS - ESV(TEICH): 26.8 ML
BH CV ECHO MEAS - FS: 30.1 %
BH CV ECHO MEAS - IVS/LVPW: 1.2
BH CV ECHO MEAS - IVSD: 1.4 CM
BH CV ECHO MEAS - LAT PEAK E' VEL: 6.7 CM/SEC
BH CV ECHO MEAS - LV DIASTOLIC VOL/BSA (35-75): 39.8 ML/M^2
BH CV ECHO MEAS - LV MASS(C)D: 175.6 GRAMS
BH CV ECHO MEAS - LV MASS(C)DI: 93.2 GRAMS/M^2
BH CV ECHO MEAS - LV MAX PG: 2.5 MMHG
BH CV ECHO MEAS - LV MEAN PG: 1.7 MMHG
BH CV ECHO MEAS - LV SYSTOLIC VOL/BSA (12-30): 10.6 ML/M^2
BH CV ECHO MEAS - LV V1 MAX: 79.7 CM/SEC
BH CV ECHO MEAS - LV V1 MEAN: 62.6 CM/SEC
BH CV ECHO MEAS - LV V1 VTI: 17.8 CM
BH CV ECHO MEAS - LVIDD: 3.8 CM
BH CV ECHO MEAS - LVIDS: 2.7 CM
BH CV ECHO MEAS - LVLD AP2: 7.8 CM
BH CV ECHO MEAS - LVLD AP4: 8 CM
BH CV ECHO MEAS - LVLS AP2: 7.1 CM
BH CV ECHO MEAS - LVLS AP4: 6.2 CM
BH CV ECHO MEAS - LVOT AREA (M): 3.8 CM^2
BH CV ECHO MEAS - LVOT AREA: 3.7 CM^2
BH CV ECHO MEAS - LVOT DIAM: 2.2 CM
BH CV ECHO MEAS - LVPWD: 1.2 CM
BH CV ECHO MEAS - MED PEAK E' VEL: 4.7 CM/SEC
BH CV ECHO MEAS - MV A DUR: 0.1 SEC
BH CV ECHO MEAS - MV A MAX VEL: 80.6 CM/SEC
BH CV ECHO MEAS - MV DEC SLOPE: 314 CM/SEC^2
BH CV ECHO MEAS - MV DEC TIME: 0.23 SEC
BH CV ECHO MEAS - MV E MAX VEL: 53.6 CM/SEC
BH CV ECHO MEAS - MV E/A: 0.67
BH CV ECHO MEAS - MV P1/2T MAX VEL: 76.4 CM/SEC
BH CV ECHO MEAS - MV P1/2T: 71.3 MSEC
BH CV ECHO MEAS - MVA P1/2T LCG: 2.9 CM^2
BH CV ECHO MEAS - MVA(P1/2T): 3.1 CM^2
BH CV ECHO MEAS - PULM A REVS DUR: 0.1 SEC
BH CV ECHO MEAS - PULM A REVS VEL: 30.5 CM/SEC
BH CV ECHO MEAS - PULM DIAS VEL: 33.8 CM/SEC
BH CV ECHO MEAS - PULM S/D: 1.6
BH CV ECHO MEAS - PULM SYS VEL: 54.7 CM/SEC
BH CV ECHO MEAS - RAP SYSTOLE: 3 MMHG
BH CV ECHO MEAS - RVSP: 16.8 MMHG
BH CV ECHO MEAS - SI(AO): 302.5 ML/M^2
BH CV ECHO MEAS - SI(CUBED): 19.9 ML/M^2
BH CV ECHO MEAS - SI(LVOT): 35.5 ML/M^2
BH CV ECHO MEAS - SI(MOD-SP2): 27.1 ML/M^2
BH CV ECHO MEAS - SI(MOD-SP4): 29.2 ML/M^2
BH CV ECHO MEAS - SI(TEICH): 19.7 ML/M^2
BH CV ECHO MEAS - SV(AO): 569.8 ML
BH CV ECHO MEAS - SV(CUBED): 37.6 ML
BH CV ECHO MEAS - SV(LVOT): 66.8 ML
BH CV ECHO MEAS - SV(MOD-SP2): 51 ML
BH CV ECHO MEAS - SV(MOD-SP4): 55 ML
BH CV ECHO MEAS - SV(TEICH): 37.1 ML
BH CV ECHO MEAS - TAPSE (>1.6): 1.8 CM
BH CV ECHO MEAS - TR MAX VEL: 185.5 CM/SEC
BH CV ECHO MEASUREMENTS AVERAGE E/E' RATIO: 9.4
BH CV STRESS BP STAGE 1: NORMAL
BH CV STRESS BP STAGE 2: NORMAL
BH CV STRESS DURATION MIN STAGE 1: 3
BH CV STRESS DURATION MIN STAGE 2: 3
BH CV STRESS DURATION MIN STAGE 3: 1
BH CV STRESS DURATION SEC STAGE 1: 0
BH CV STRESS DURATION SEC STAGE 2: 0
BH CV STRESS DURATION SEC STAGE 3: 0
BH CV STRESS ECHO POST STRESS EJECTION FRACTION EF: 73 %
BH CV STRESS GRADE STAGE 1: 10
BH CV STRESS GRADE STAGE 2: 12
BH CV STRESS GRADE STAGE 3: 14
BH CV STRESS HR STAGE 1: 113
BH CV STRESS HR STAGE 2: 135
BH CV STRESS HR STAGE 3: 143
BH CV STRESS METS STAGE 1: 5
BH CV STRESS METS STAGE 2: 7.5
BH CV STRESS METS STAGE 3: 10
BH CV STRESS PROTOCOL 1: NORMAL
BH CV STRESS SPEED STAGE 1: 1.7
BH CV STRESS SPEED STAGE 2: 2.5
BH CV STRESS SPEED STAGE 3: 3.4
BH CV STRESS STAGE 1: 1
BH CV STRESS STAGE 2: 2
BH CV STRESS STAGE 3: 3
BH CV XLRA - RV BASE: 2.9 CM
BH CV XLRA - RV LENGTH: 7.4 CM
BH CV XLRA - TDI S': 9.1 CM/SEC
LEFT ATRIUM VOLUME INDEX: 17 ML/M2
MAXIMAL PREDICTED HEART RATE: 156 BPM
PERCENT MAX PREDICTED HR: 91.67 %
SINUS: 3.3 CM
STJ: 3.7 CM
STRESS BASELINE BP: NORMAL MMHG
STRESS BASELINE HR: 86 BPM
STRESS PERCENT HR: 108 %
STRESS POST ESTIMATED WORKLOAD: 8 METS
STRESS POST EXERCISE DUR MIN: 7 MIN
STRESS POST EXERCISE DUR SEC: 0 SEC
STRESS POST PEAK BP: NORMAL MMHG
STRESS POST PEAK HR: 143 BPM
STRESS TARGET HR: 133 BPM

## 2020-08-24 PROCEDURE — 93320 DOPPLER ECHO COMPLETE: CPT | Performed by: INTERNAL MEDICINE

## 2020-08-24 PROCEDURE — 93325 DOPPLER ECHO COLOR FLOW MAPG: CPT | Performed by: INTERNAL MEDICINE

## 2020-08-24 PROCEDURE — 93320 DOPPLER ECHO COMPLETE: CPT

## 2020-08-24 PROCEDURE — 93018 CV STRESS TEST I&R ONLY: CPT | Performed by: INTERNAL MEDICINE

## 2020-08-24 PROCEDURE — 93017 CV STRESS TEST TRACING ONLY: CPT

## 2020-08-24 PROCEDURE — 93352 ADMIN ECG CONTRAST AGENT: CPT | Performed by: INTERNAL MEDICINE

## 2020-08-24 PROCEDURE — 93325 DOPPLER ECHO COLOR FLOW MAPG: CPT

## 2020-08-24 PROCEDURE — 93350 STRESS TTE ONLY: CPT

## 2020-08-24 PROCEDURE — 93016 CV STRESS TEST SUPVJ ONLY: CPT | Performed by: INTERNAL MEDICINE

## 2020-08-24 PROCEDURE — 93350 STRESS TTE ONLY: CPT | Performed by: INTERNAL MEDICINE

## 2020-08-24 PROCEDURE — 93000 ELECTROCARDIOGRAM COMPLETE: CPT | Performed by: INTERNAL MEDICINE

## 2020-08-24 PROCEDURE — 25010000002 PERFLUTREN (DEFINITY) 8.476 MG IN SODIUM CHLORIDE 0.9 % 10 ML INJECTION: Performed by: INTERNAL MEDICINE

## 2020-08-24 RX ADMIN — PERFLUTREN 3 ML: 6.52 INJECTION, SUSPENSION INTRAVENOUS at 09:57

## 2020-08-28 ENCOUNTER — TELEPHONE (OUTPATIENT)
Dept: CARDIOLOGY | Facility: CLINIC | Age: 65
End: 2020-08-28

## 2020-08-28 DIAGNOSIS — I35.0 NONRHEUMATIC AORTIC VALVE STENOSIS: Primary | ICD-10-CM

## 2020-08-31 ENCOUNTER — TELEPHONE (OUTPATIENT)
Dept: CARDIOLOGY | Facility: CLINIC | Age: 65
End: 2020-08-31

## 2020-09-01 RX ORDER — METOPROLOL SUCCINATE 25 MG/1
TABLET, EXTENDED RELEASE ORAL
Qty: 45 TABLET | Refills: 1 | Status: SHIPPED | OUTPATIENT
Start: 2020-09-01 | End: 2020-12-14 | Stop reason: SDUPTHER

## 2020-09-01 NOTE — TELEPHONE ENCOUNTER
I talked to patient regarding prior EKG findings and reviewed with him that he had similar labile changes dating back to 2018 at least.  Reviewed with him that the current EKG again shows a leftward trend.  In addition I reviewed the stress echocardiogram results with him and again reminded him we have previously discussed aortic valve disease and this was part of the reason for the stress echocardiogram.  Went over the natural history of aortic valve stenosis.  At this time continue medical therapy plan on repeat imaging as previously noted in 1 year.

## 2020-09-16 ENCOUNTER — TELEPHONE (OUTPATIENT)
Dept: CARDIOLOGY | Facility: CLINIC | Age: 65
End: 2020-09-16

## 2020-12-14 RX ORDER — LOSARTAN POTASSIUM AND HYDROCHLOROTHIAZIDE 12.5; 5 MG/1; MG/1
1 TABLET ORAL DAILY
Qty: 90 TABLET | Refills: 3 | Status: SHIPPED | OUTPATIENT
Start: 2020-12-14 | End: 2020-12-17 | Stop reason: SDUPTHER

## 2020-12-14 RX ORDER — METOPROLOL SUCCINATE 25 MG/1
12.5 TABLET, EXTENDED RELEASE ORAL DAILY
Qty: 45 TABLET | Refills: 3 | Status: SHIPPED | OUTPATIENT
Start: 2020-12-14 | End: 2020-12-17 | Stop reason: SDUPTHER

## 2020-12-15 RX ORDER — ATORVASTATIN CALCIUM 80 MG/1
80 TABLET, FILM COATED ORAL DAILY
Qty: 90 TABLET | Refills: 3 | Status: SHIPPED | OUTPATIENT
Start: 2020-12-15 | End: 2021-12-09

## 2020-12-15 RX ORDER — ESOMEPRAZOLE MAGNESIUM 40 MG/1
40 CAPSULE, DELAYED RELEASE ORAL
Qty: 90 CAPSULE | Refills: 1 | Status: SHIPPED | OUTPATIENT
Start: 2020-12-15 | End: 2020-12-22 | Stop reason: SDUPTHER

## 2020-12-17 RX ORDER — LOSARTAN POTASSIUM AND HYDROCHLOROTHIAZIDE 12.5; 5 MG/1; MG/1
1 TABLET ORAL DAILY
Qty: 90 TABLET | Refills: 3 | Status: SHIPPED | OUTPATIENT
Start: 2020-12-17 | End: 2021-08-27

## 2020-12-17 RX ORDER — METOPROLOL SUCCINATE 25 MG/1
12.5 TABLET, EXTENDED RELEASE ORAL DAILY
Qty: 45 TABLET | Refills: 3 | Status: SHIPPED | OUTPATIENT
Start: 2020-12-17 | End: 2021-11-24

## 2020-12-21 ENCOUNTER — TELEPHONE (OUTPATIENT)
Dept: INTERNAL MEDICINE | Facility: CLINIC | Age: 65
End: 2020-12-21

## 2020-12-21 NOTE — TELEPHONE ENCOUNTER
PT advised that appointment scheduled in case PT not feeling any better, PT will call and cancel if symptoms get better

## 2020-12-21 NOTE — TELEPHONE ENCOUNTER
PATIENT CALLED WITH FEVER 99.0 AND HEADACHE AND NAUSEA, HE BELIEVES IT MAY BE SINUS ISSUES, BUT WANTS TO BE CAREFUL WITH DEEJAY COMING UP.    PLEASE CALL AND ADVISE [phone]    TRIED TO MAKE SAME DAY APPOINTMENT AND CALLED OFFICE AND SHE STATED TO SEND NOTE.

## 2020-12-22 ENCOUNTER — OFFICE VISIT (OUTPATIENT)
Dept: INTERNAL MEDICINE | Facility: CLINIC | Age: 65
End: 2020-12-22

## 2020-12-22 VITALS
HEART RATE: 70 BPM | HEIGHT: 68 IN | DIASTOLIC BLOOD PRESSURE: 82 MMHG | WEIGHT: 168 LBS | TEMPERATURE: 97 F | BODY MASS INDEX: 25.46 KG/M2 | RESPIRATION RATE: 16 BRPM | OXYGEN SATURATION: 98 % | SYSTOLIC BLOOD PRESSURE: 130 MMHG

## 2020-12-22 DIAGNOSIS — J02.9 SORE THROAT: ICD-10-CM

## 2020-12-22 DIAGNOSIS — B99.9 FEVER DUE TO INFECTION: Primary | ICD-10-CM

## 2020-12-22 PROCEDURE — 99213 OFFICE O/P EST LOW 20 MIN: CPT | Performed by: NURSE PRACTITIONER

## 2020-12-22 RX ORDER — ESOMEPRAZOLE MAGNESIUM 40 MG/1
40 CAPSULE, DELAYED RELEASE ORAL 2 TIMES DAILY
Qty: 180 CAPSULE | Refills: 3 | Status: SHIPPED | OUTPATIENT
Start: 2020-12-22 | End: 2021-12-09

## 2020-12-22 NOTE — PROGRESS NOTES
Chief Complaint   Patient presents with   • Headache       Subjective     Chino Huber is a 65 y.o. male being seen for an acute visit for Headache, sore throat, body aches, and fever. He has been exposed to grandsons , but no known illnesses. He tried OTC Dayquil. Denies loss of taste and smell or known covid exposure.       History of Present Illness     Allergies   Allergen Reactions   • Lisinopril Cough         Current Outpatient Medications:   •  aspirin 81 MG chewable tablet, Chew 81 mg 2 (Two) Times a Day., Disp: , Rfl:   •  atorvastatin (LIPITOR) 80 MG tablet, Take 1 tablet by mouth Daily., Disp: 90 tablet, Rfl: 3  •  cetirizine (zyrTEC) 10 MG tablet, Take 10 mg by mouth As Needed., Disp: , Rfl:   •  esomeprazole (nexIUM) 40 MG capsule, Take 1 capsule by mouth Every Morning Before Breakfast., Disp: 90 capsule, Rfl: 1  •  losartan-hydrochlorothiazide (Hyzaar) 50-12.5 MG per tablet, Take 1 tablet by mouth Daily., Disp: 90 tablet, Rfl: 3  •  metoprolol succinate XL (TOPROL-XL) 25 MG 24 hr tablet, Take 0.5 tablets by mouth Daily., Disp: 45 tablet, Rfl: 3  •  mometasone (NASONEX) 50 MCG/ACT nasal spray, 2 sprays into the nostril(s) as directed by provider Daily. (Patient taking differently: 2 sprays into the nostril(s) as directed by provider As Needed.), Disp: 17 g, Rfl: 0  •  montelukast (SINGULAIR) 10 MG tablet, TAKE 1 TABLET EVERY NIGHT, Disp: 90 tablet, Rfl: 0    The following portions of the patient's history were reviewed and updated as appropriate: allergies, current medications, past family history, past medical history, past social history, past surgical history and problem list.    Review of Systems   Constitutional: Negative.    HENT: Positive for congestion, postnasal drip, rhinorrhea, sinus pressure, sinus pain and sore throat.    Eyes: Negative.    Cardiovascular: Negative for chest pain and leg swelling.   Endocrine: Negative.    Allergic/Immunologic: Positive for environmental allergies.        Assessment     Physical Exam  Vitals signs reviewed.   Constitutional:       Appearance: Normal appearance. He is not ill-appearing.   HENT:      Right Ear: Tympanic membrane normal.      Left Ear: Tympanic membrane normal.      Nose: No congestion or rhinorrhea.      Mouth/Throat:      Mouth: Mucous membranes are moist.      Pharynx: No posterior oropharyngeal erythema.   Cardiovascular:      Rate and Rhythm: Normal rate and regular rhythm.      Heart sounds: No murmur.   Pulmonary:      Effort: Pulmonary effort is normal.      Breath sounds: Normal breath sounds.   Neurological:      General: No focal deficit present.      Mental Status: He is alert and oriented to person, place, and time.   Psychiatric:         Mood and Affect: Mood normal.         Behavior: Behavior normal.         Thought Content: Thought content normal.         Plan         Diagnoses and all orders for this visit:    1. Fever due to infection (Primary)  -     COVID-19,[facility] ROUTINE, NP/OP SWAB IN TRANSPORT MEDIA OR ESWAB 72 HR TAT - Swab, Nasopharynx    2. Sore throat  -     COVID-19,[facility] ROUTINE, NP/OP SWAB IN TRANSPORT MEDIA OR ESWAB 72 HR TAT - Swab, Nasopharynx    Other orders  -     esomeprazole (nexIUM) 40 MG capsule; Take 1 capsule by mouth 2 (two) times a day.  Dispense: 180 capsule; Refill: 3    Self quarantine until results are in    Follow up as needed

## 2020-12-28 ENCOUNTER — TELEPHONE (OUTPATIENT)
Dept: INTERNAL MEDICINE | Facility: CLINIC | Age: 65
End: 2020-12-28

## 2020-12-29 LAB — SARS-COV-2 RNA RESP QL NAA+PROBE: NOT DETECTED

## 2021-03-29 ENCOUNTER — TELEPHONE (OUTPATIENT)
Dept: INTERNAL MEDICINE | Facility: CLINIC | Age: 66
End: 2021-03-29

## 2021-03-29 NOTE — TELEPHONE ENCOUNTER
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     Do you require a callback: YES

## 2021-03-29 NOTE — TELEPHONE ENCOUNTER
Spoke with patient, needs a  physical for FAA completed but it has to be signed off my a MD. Patient states he has spoke with you about this.   Not sure how to schedule this. Any suggestions?

## 2021-08-09 ENCOUNTER — HOSPITAL ENCOUNTER (OUTPATIENT)
Dept: CARDIOLOGY | Facility: HOSPITAL | Age: 66
Discharge: HOME | End: 2021-08-09
Admitting: NURSE PRACTITIONER

## 2021-08-09 VITALS — BODY MASS INDEX: 25.46 KG/M2 | WEIGHT: 168 LBS | HEART RATE: 57 BPM | HEIGHT: 68 IN

## 2021-08-09 DIAGNOSIS — I35.0 NONRHEUMATIC AORTIC VALVE STENOSIS: ICD-10-CM

## 2021-08-09 LAB
AORTIC ARCH: 2 CM
AORTIC DIMENSIONLESS INDEX: 0.4 (DI)
ASCENDING AORTA: 4.1 CM
BH CV ECHO MEAS - ACS: 1.4 CM
BH CV ECHO MEAS - AO ARCH DIAM (PROXIMAL TRANS.): 2 CM
BH CV ECHO MEAS - AO MAX PG (FULL): 25.2 MMHG
BH CV ECHO MEAS - AO MAX PG: 28.9 MMHG
BH CV ECHO MEAS - AO MEAN PG (FULL): 13 MMHG
BH CV ECHO MEAS - AO MEAN PG: 15 MMHG
BH CV ECHO MEAS - AO ROOT AREA (BSA CORRECTED): 2.1
BH CV ECHO MEAS - AO ROOT AREA: 12.6 CM^2
BH CV ECHO MEAS - AO ROOT DIAM: 4 CM
BH CV ECHO MEAS - AO V2 MAX: 269 CM/SEC
BH CV ECHO MEAS - AO V2 MEAN: 186 CM/SEC
BH CV ECHO MEAS - AO V2 VTI: 67.4 CM
BH CV ECHO MEAS - ASC AORTA: 3.4 CM
BH CV ECHO MEAS - AVA(I,A): 1.5 CM^2
BH CV ECHO MEAS - AVA(I,D): 1.5 CM^2
BH CV ECHO MEAS - AVA(V,A): 1.4 CM^2
BH CV ECHO MEAS - AVA(V,D): 1.4 CM^2
BH CV ECHO MEAS - BSA(HAYCOCK): 1.9 M^2
BH CV ECHO MEAS - BSA: 1.9 M^2
BH CV ECHO MEAS - BZI_BMI: 25.5 KILOGRAMS/M^2
BH CV ECHO MEAS - BZI_METRIC_HEIGHT: 172.7 CM
BH CV ECHO MEAS - BZI_METRIC_WEIGHT: 76.2 KG
BH CV ECHO MEAS - EDV(MOD-SP2): 85 ML
BH CV ECHO MEAS - EDV(MOD-SP4): 81 ML
BH CV ECHO MEAS - EDV(TEICH): 83.1 ML
BH CV ECHO MEAS - EF(CUBED): 72.4 %
BH CV ECHO MEAS - EF(MOD-BP): 64 %
BH CV ECHO MEAS - EF(MOD-SP2): 64.7 %
BH CV ECHO MEAS - EF(MOD-SP4): 63 %
BH CV ECHO MEAS - EF(TEICH): 64.4 %
BH CV ECHO MEAS - ESV(MOD-SP2): 30 ML
BH CV ECHO MEAS - ESV(MOD-SP4): 30 ML
BH CV ECHO MEAS - ESV(TEICH): 29.6 ML
BH CV ECHO MEAS - FS: 34.9 %
BH CV ECHO MEAS - IVS/LVPW: 1
BH CV ECHO MEAS - IVSD: 1.1 CM
BH CV ECHO MEAS - LAT PEAK E' VEL: 10.8 CM/SEC
BH CV ECHO MEAS - LV DIASTOLIC VOL/BSA (35-75): 42.7 ML/M^2
BH CV ECHO MEAS - LV MASS(C)D: 162.9 GRAMS
BH CV ECHO MEAS - LV MASS(C)DI: 85.9 GRAMS/M^2
BH CV ECHO MEAS - LV MAX PG: 3.8 MMHG
BH CV ECHO MEAS - LV MEAN PG: 2 MMHG
BH CV ECHO MEAS - LV SYSTOLIC VOL/BSA (12-30): 15.8 ML/M^2
BH CV ECHO MEAS - LV V1 MAX: 97.3 CM/SEC
BH CV ECHO MEAS - LV V1 MEAN: 72.6 CM/SEC
BH CV ECHO MEAS - LV V1 VTI: 26.5 CM
BH CV ECHO MEAS - LVIDD: 4.3 CM
BH CV ECHO MEAS - LVIDS: 2.8 CM
BH CV ECHO MEAS - LVLD AP2: 8.1 CM
BH CV ECHO MEAS - LVLD AP4: 8.1 CM
BH CV ECHO MEAS - LVLS AP2: 6.8 CM
BH CV ECHO MEAS - LVLS AP4: 7.4 CM
BH CV ECHO MEAS - LVOT AREA (M): 3.8 CM^2
BH CV ECHO MEAS - LVOT AREA: 3.8 CM^2
BH CV ECHO MEAS - LVOT DIAM: 2.2 CM
BH CV ECHO MEAS - LVPWD: 1.1 CM
BH CV ECHO MEAS - MED PEAK E' VEL: 8.6 CM/SEC
BH CV ECHO MEAS - MR MAX PG: 13 MMHG
BH CV ECHO MEAS - MR MAX VEL: 180 CM/SEC
BH CV ECHO MEAS - MV A DUR: 0.15 SEC
BH CV ECHO MEAS - MV A MAX VEL: 78 CM/SEC
BH CV ECHO MEAS - MV DEC SLOPE: 495 CM/SEC^2
BH CV ECHO MEAS - MV DEC TIME: 0.17 SEC
BH CV ECHO MEAS - MV E MAX VEL: 88.7 CM/SEC
BH CV ECHO MEAS - MV E/A: 1.1
BH CV ECHO MEAS - MV MAX PG: 4.2 MMHG
BH CV ECHO MEAS - MV MEAN PG: 1 MMHG
BH CV ECHO MEAS - MV P1/2T MAX VEL: 105 CM/SEC
BH CV ECHO MEAS - MV P1/2T: 62.1 MSEC
BH CV ECHO MEAS - MV V2 MAX: 103 CM/SEC
BH CV ECHO MEAS - MV V2 MEAN: 53.1 CM/SEC
BH CV ECHO MEAS - MV V2 VTI: 40.9 CM
BH CV ECHO MEAS - MVA P1/2T LCG: 2.1 CM^2
BH CV ECHO MEAS - MVA(P1/2T): 3.5 CM^2
BH CV ECHO MEAS - MVA(VTI): 2.5 CM^2
BH CV ECHO MEAS - PA ACC TIME: 0.16 SEC
BH CV ECHO MEAS - PA MAX PG (FULL): 2.7 MMHG
BH CV ECHO MEAS - PA MAX PG: 4.5 MMHG
BH CV ECHO MEAS - PA PR(ACCEL): 9.3 MMHG
BH CV ECHO MEAS - PA V2 MAX: 106 CM/SEC
BH CV ECHO MEAS - PULM A REVS DUR: 0.12 SEC
BH CV ECHO MEAS - PULM A REVS VEL: 29.6 CM/SEC
BH CV ECHO MEAS - PULM DIAS VEL: 42.8 CM/SEC
BH CV ECHO MEAS - PULM S/D: 1.5
BH CV ECHO MEAS - PULM SYS VEL: 62.6 CM/SEC
BH CV ECHO MEAS - PVA(V,A): 1.1 CM^2
BH CV ECHO MEAS - PVA(V,D): 1.1 CM^2
BH CV ECHO MEAS - QP/QS: 0.3
BH CV ECHO MEAS - RV MAX PG: 1.8 MMHG
BH CV ECHO MEAS - RV MEAN PG: 1 MMHG
BH CV ECHO MEAS - RV V1 MAX: 67.2 CM/SEC
BH CV ECHO MEAS - RV V1 MEAN: 54.3 CM/SEC
BH CV ECHO MEAS - RV V1 VTI: 17.2 CM
BH CV ECHO MEAS - RVOT AREA: 1.8 CM^2
BH CV ECHO MEAS - RVOT DIAM: 1.5 CM
BH CV ECHO MEAS - SI(AO): 446.3 ML/M^2
BH CV ECHO MEAS - SI(CUBED): 30.3 ML/M^2
BH CV ECHO MEAS - SI(LVOT): 53.1 ML/M^2
BH CV ECHO MEAS - SI(MOD-SP2): 29 ML/M^2
BH CV ECHO MEAS - SI(MOD-SP4): 26.9 ML/M^2
BH CV ECHO MEAS - SI(TEICH): 28.2 ML/M^2
BH CV ECHO MEAS - SUP REN AO DIAM: 1.9 CM
BH CV ECHO MEAS - SV(AO): 847 ML
BH CV ECHO MEAS - SV(CUBED): 57.6 ML
BH CV ECHO MEAS - SV(LVOT): 100.7 ML
BH CV ECHO MEAS - SV(MOD-SP2): 55 ML
BH CV ECHO MEAS - SV(MOD-SP4): 51 ML
BH CV ECHO MEAS - SV(RVOT): 30.4 ML
BH CV ECHO MEAS - SV(TEICH): 53.5 ML
BH CV ECHO MEAS - TAPSE (>1.6): 2.4 CM
BH CV ECHO MEASUREMENTS AVERAGE E/E' RATIO: 9.14
BH CV XLRA - RV BASE: 3.3 CM
BH CV XLRA - RV LENGTH: 7.4 CM
BH CV XLRA - RV MID: 2.6 CM
BH CV XLRA - TDI S': 11.6 CM/SEC
LEFT ATRIUM VOLUME INDEX: 19 ML/M2
MAXIMAL PREDICTED HEART RATE: 155 BPM
SINUS: 3.8 CM
STJ: 3.3 CM
STRESS TARGET HR: 132 BPM

## 2021-08-09 PROCEDURE — 93306 TTE W/DOPPLER COMPLETE: CPT | Performed by: INTERNAL MEDICINE

## 2021-08-09 PROCEDURE — 93306 TTE W/DOPPLER COMPLETE: CPT

## 2021-08-09 NOTE — PROGRESS NOTES
This is your patient.  I have never seen him.  I guess I called results last year and you had me order a follow-up echocardiogram.  Please review.  I am happy to call patient with any of your recommendations if needed but I have never seen him and would like for your to review the results

## 2021-08-10 ENCOUNTER — TELEPHONE (OUTPATIENT)
Dept: CARDIOLOGY | Facility: CLINIC | Age: 66
End: 2021-08-10

## 2021-08-10 NOTE — TELEPHONE ENCOUNTER
Called and reviewed echo results with patient. EF is normal normal wall contractility no chamber dilation has a bicuspid aortic valve continues to have mild to moderate aortic valve stenosis does not appear to be significantly changed from his echo last year he has had known ascending aortic dilation looks like his root might be more dilated than prior.    Dr. Berman he sees you 9/8/2021. I am going to defer to you on imaging of his aorta. I did discuss this with the patient and he verbalized understanding to discuss this with you further. Please let me know if you have anything additional to add this patient before his next visit as I was just calling results I have never seen him before patient verbalized understanding of the plan of care and had no further questions or concerns

## 2021-08-10 NOTE — PROGRESS NOTES
Called and reviewed echo results with patient. No significant change there is some dilation of the aortic root. I will have Dr. Berman determine repeat imaging of his aorta at his upcoming appointment 9/8/2021. Patient verbalized understanding to discuss this further at his appointment. Please see telephone encounter.

## 2021-08-27 RX ORDER — LOSARTAN POTASSIUM AND HYDROCHLOROTHIAZIDE 12.5; 5 MG/1; MG/1
TABLET ORAL
Qty: 90 TABLET | Refills: 3 | Status: SHIPPED | OUTPATIENT
Start: 2021-08-27 | End: 2021-12-17 | Stop reason: SDUPTHER

## 2021-09-08 ENCOUNTER — OFFICE VISIT (OUTPATIENT)
Dept: CARDIOLOGY | Facility: CLINIC | Age: 66
End: 2021-09-08

## 2021-09-08 VITALS
WEIGHT: 170 LBS | HEART RATE: 47 BPM | BODY MASS INDEX: 25.76 KG/M2 | DIASTOLIC BLOOD PRESSURE: 68 MMHG | HEIGHT: 68 IN | SYSTOLIC BLOOD PRESSURE: 128 MMHG

## 2021-09-08 DIAGNOSIS — I35.0 NONRHEUMATIC AORTIC VALVE STENOSIS: Primary | ICD-10-CM

## 2021-09-08 DIAGNOSIS — I10 HYPERTENSION, ESSENTIAL: ICD-10-CM

## 2021-09-08 DIAGNOSIS — I25.10 CORONARY ARTERY DISEASE INVOLVING NATIVE CORONARY ARTERY OF NATIVE HEART WITHOUT ANGINA PECTORIS: ICD-10-CM

## 2021-09-08 DIAGNOSIS — I71.20 THORACIC AORTIC ANEURYSM WITHOUT RUPTURE (HCC): ICD-10-CM

## 2021-09-08 PROCEDURE — 93000 ELECTROCARDIOGRAM COMPLETE: CPT | Performed by: INTERNAL MEDICINE

## 2021-09-08 PROCEDURE — 99214 OFFICE O/P EST MOD 30 MIN: CPT | Performed by: INTERNAL MEDICINE

## 2021-09-08 NOTE — PROGRESS NOTES
Date of Office Visit: 2021  Encounter Provider: Kisha Berman MD  Place of Service: [facility] CARDIOLOGY  Patient Name: Chino Huber  :1955    Chief complaint  CAD, aortic stenosis    History of Present Illness  The patient is a pleasant, 65 year-old gentleman with history of prostate cancer status  post resection, hypertension, and hyperlipidemia who in 2014 was noted to have coronary  artery disease when he presented with chest pain and abnormal stress test. A cardiac  catheterization revealed 90% stenosis of the circumflex artery and of the right coronary  artery. The left anterior descending and left main were normal. He underwent drug-coated  stent placement to both the circumflex and the right coronary artery.  He had a CT angio of his chest in May 2015 that revealed stable thoracic aneurysm measuring 4.0 cm.  In 2018 he had an echocardiogram that showed normal systolic function with mild left atrial enlargement, bicuspid aortic valve with mild stenosis with a mean gradient of 10 mmHg and mildly dilated asending aorta measuring 3.6 cm.  CT angiogram of the chest showed stable dilated a sending aorta measuring 4.1 cm at the ascending aortic level.  In 2020 MR angiography of the chest showed asending aortic aneurysm measuring 3.7 cm overall unchanged from CT study where it measured 4.1 cm.  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  He had a stress echocardiogram 2020 that showed no ischemia at a good workload.  LV systolic function was normal with grade 1 diastolic functions that was mild to moderate aortic valve stenosis with a valve area of 1.47 square to mean gradient of 23 mmHg.  RV systolic pressure was normal.  He had a limited echocardiogram a month ago that showed normal systolic and diastolic function with valve area of 1.5 cm.  Mean gradient of 15 mmHg and a sending aorta measuring 4.1 cm.  Images were  adequate.    Since last visit he has had no chest pain, shortness of breath, palpitations, syncope near syncope.  He is exercising by walking regularly.  No recent blood work on file.    Past Medical History:   Diagnosis Date   • Acute sinusitis    • Allergic 1969   • Aortic root dilatation (CMS/HCC)    • Aortic stenosis    • Aortic valve stenosis     mild   • Arteriosclerotic coronary artery disease    • Bicuspid aortic valve    • CAD (coronary artery disease)    • Cancer (CMS/HCC) 2009    Prostate   • Chest pain    • Colon polyp    • Cough    • Dilated aortic root (CMS/HCC)    • ED (erectile dysfunction)    • Erectile dysfunction 2009    Prostatectomy   • Folliculitis    • GERD (gastroesophageal reflux disease)    • Heart murmur 2013    Aortic bicuspid   • Heartburn    • Hyperlipidemia    • Hypertension    • Infectious viral hepatitis    • Nonspecific abnormal findings on radiological and examination of intrathoracic organs    • Precordial pain    • Reflux esophagitis    • Sore throat    • Thoracic aortic aneurysm without rupture (CMS/HCC)    • Throat pain      Past Surgical History:   Procedure Laterality Date   • ABDOMINAL SURGERY  Prostate   • CARDIAC CATHETERIZATION  6/2013    Stent placement   • CLOSED REDUCTION FOREARM FRACTURE Right    • COLONOSCOPY     • COLONOSCOPY     • COLONOSCOPY N/A 2/3/2017    Procedure: COLONOSCOPY TO CECUM WITH HOT SNARE POLYPECTOMY WITH NORMAL SALINE INJECTION AND  TWO RESOLUTION CLIPS;  Surgeon: Fam LILLY MD;  Location: [facility] ENDOSCOPY;  Service:    • COLONOSCOPY N/A 1/31/2020    Procedure: COLONOSCOPY TO TI  WITH POLYPECTOMY (COLD SNARE);  Surgeon: Fam Roes MD;  Location: [facility] ENDOSCOPY;  Service: Gastroenterology   • CORONARY ANGIOPLASTY WITH STENT PLACEMENT     • CORONARY STENT PLACEMENT  6/2013   • ENDOSCOPY N/A 3/15/2017    Procedure: ESOPHAGOGASTRODUODENOSCOPY WITH BIOPSIES (COLD);  Surgeon: Fam LILLY MD;  Location: [facility];   Service:    • ENDOSCOPY N/A 1/31/2020    Procedure: ESOPHAGOGASTRODUODENOSCOPY WITH BIOPSIES;  Surgeon: Fam Rose MD;  Location: [facility] ENDOSCOPY;  Service: Gastroenterology   • LASIK Bilateral    • PROSTATE SURGERY     • PROSTATE SURGERY     • RHINOPLASTY     • RHINOPLASTY     • SINUS SURGERY  1987/1988   • TONSILLECTOMY  1962   • UPPER GASTROINTESTINAL ENDOSCOPY  3/2017     Outpatient Medications Prior to Visit   Medication Sig Dispense Refill   • aspirin 81 MG chewable tablet Chew 81 mg 2 (Two) Times a Day.     • atorvastatin (LIPITOR) 80 MG tablet Take 1 tablet by mouth Daily. 90 tablet 3   • esomeprazole (nexIUM) 40 MG capsule Take 1 capsule by mouth 2 (two) times a day. 180 capsule 3   • losartan-hydrochlorothiazide (HYZAAR) 50-12.5 MG per tablet TAKE 1 TABLET EVERY DAY 90 tablet 3   • metoprolol succinate XL (TOPROL-XL) 25 MG 24 hr tablet Take 0.5 tablets by mouth Daily. 45 tablet 3   • mometasone (NASONEX) 50 MCG/ACT nasal spray 2 sprays into the nostril(s) as directed by provider Daily. (Patient taking differently: 2 sprays into the nostril(s) as directed by provider As Needed.) 17 g 0   • cetirizine (zyrTEC) 10 MG tablet Take 10 mg by mouth As Needed.     • montelukast (SINGULAIR) 10 MG tablet TAKE 1 TABLET EVERY NIGHT 90 tablet 0     No facility-administered medications prior to visit.       Allergies as of 09/08/2021 - Reviewed 09/08/2021   Allergen Reaction Noted   • Lisinopril Cough 07/30/2019     Social History     Socioeconomic History   • Marital status:      Spouse name: Not on file   • Number of children: Not on file   • Years of education: Not on file   • Highest education level: Not on file   Tobacco Use   • Smoking status: Never Smoker   • Smokeless tobacco: Never Used   • Tobacco comment: caffeine use   Substance and Sexual Activity   • Alcohol use: Yes     Alcohol/week: 1.0 standard drinks     Types: 1 Cans of beer per week   • Drug use: No   • Sexual activity: Not  "Currently     Partners: Female     Birth control/protection: Surgical     Comment: Vasectomy, Prostatectomy     Family History   Problem Relation Age of Onset   • Heart disease Mother    • Hypertension Mother    • Colon cancer Mother          due to colon cancer   • Cancer Mother         colon   • Hearing loss Mother    • Heart disease Father    • Hypertension Father    • Other Father         CABG, heart valve replacement   • Coronary artery disease Father    • Heart failure Father    • Cancer Father         prostate   • Glaucoma Father    • Cancer Sister         Breast   • Hypertension Sister    • Colon polyps Sister         Recent polyp remival   • Cancer Sister         Breast   • Hypertension Sister    • Malig Hyperthermia Neg Hx      Review of Systems   Constitutional: Negative for chills, fever, weight gain and weight loss.   Cardiovascular: Negative for leg swelling.   Respiratory: Negative for cough, snoring and wheezing.    Hematologic/Lymphatic: Negative for bleeding problem. Does not bruise/bleed easily.   Skin: Negative for color change.   Musculoskeletal: Negative for falls, joint pain and myalgias.   Gastrointestinal: Negative for melena.   Genitourinary: Negative for hematuria.   Neurological: Negative for excessive daytime sleepiness.   Psychiatric/Behavioral: Negative for depression. The patient is not nervous/anxious.         Objective:     Vitals:    21 0946   BP: 128/68   Pulse: (!) 47   Weight: 77.1 kg (170 lb)   Height: 172.7 cm (68\")     Body mass index is 25.85 kg/m².    Vitals reviewed.   Constitutional:       Appearance: Well-developed.   Eyes:      General: No scleral icterus.        Right eye: No discharge.      Conjunctiva/sclera: Conjunctivae normal.      Pupils: Pupils are equal, round, and reactive to light.   HENT:      Head: Normocephalic.      Nose: Nose normal.   Neck:      Thyroid: No thyromegaly.      Vascular: No JVD.   Pulmonary:      Effort: Pulmonary effort is " normal. No respiratory distress.      Breath sounds: Normal breath sounds. No wheezing. No rales.   Cardiovascular:      Bradycardia present. Regular rhythm. Normal S1. Normal S2.      Murmurs: There is a grade 2/6 harsh midsystolic murmur at the URSB, radiating to the neck.      No gallop.   Abdominal:      General: Bowel sounds are normal. There is no distension.      Palpations: Abdomen is soft.      Tenderness: There is no abdominal tenderness. There is no rebound.   Musculoskeletal: Normal range of motion.         General: No tenderness.      Cervical back: Normal range of motion and neck supple. Skin:     General: Skin is warm and dry.      Findings: No erythema or rash.   Neurological:      Mental Status: Alert and oriented to person, place, and time.   Psychiatric:         Behavior: Behavior normal.         Thought Content: Thought content normal.         Judgment: Judgment normal.       Lab Review:     ECG 12 Lead    Date/Time: 9/8/2021 9:53 AM  Performed by: Kisha Berman MD  Authorized by: Kisha Berman MD   Comparison: compared with previous ECG   Comparison to previous ECG: Heart rate is slower  Rhythm: sinus bradycardia    Clinical impression: normal ECG          Assessment:       Diagnosis Plan   1. Nonrheumatic aortic valve stenosis  ECG 12 Lead   2. Coronary artery disease involving native coronary artery of native heart without angina pectoris  ECG 12 Lead   3. Hypertension, essential     4. Thoracic aortic aneurysm without rupture (CMS/HCC)       Plan:       1.  Coronary artery disease with history of prior stenting 2014 and negative stress echo 8/2020.  Continue with aspirin therapy and risk factor modification.  Follow-up in 6 months.  2.  Mildly dilated ascending aorta, stable on CT in September 2018 and by MRI in February 2010.  Stable on echo 8/2021 measuring 4.1 cm similar to prior CTs findings.  MRI had been smaller.  Plan on repeat MR angiogram of the chest in 6 months at follow-up.  3.   Mild to moderate aortic valve stenosis.  Gradient has improved likely due to use of beta-blockers which were held at the time of the stress test.  At this point plan on repeat echocardiographic imaging in 2 years.  4.  Hyperlipidemia.  Controlled though he has not had any recent blood work but will obtain this through Dr. Scherer's office  5.  Hypertension.,  Controlled        Time Spent: I spent 35 minutes caring for Chino on this date of service. This time includes time spent by me in the following activities: preparing for the visit, reviewing tests, obtaining and/or reviewing a separately obtained history, performing a medically appropriate examination and/or evaluation, counseling and educating the patient/family/caregiver, documenting information in the medical record and independently interpreting results and communicating that information with the patient/family/caregiver.   I spent 1 minutes on the separately reported service of ECG. This time is not included in the time used to support the E/M service also reported today.        Your medication list          Accurate as of September 8, 2021 10:46 AM. If you have any questions, ask your nurse or doctor.            CHANGE how you take these medications      Instructions Last Dose Given Next Dose Due   mometasone 50 MCG/ACT nasal spray  Commonly known as: Nasonex  What changed:   · when to take this  · reasons to take this      2 sprays into the nostril(s) as directed by provider Daily.          CONTINUE taking these medications      Instructions Last Dose Given Next Dose Due   aspirin 81 MG chewable tablet      Chew 81 mg 2 (Two) Times a Day.       atorvastatin 80 MG tablet  Commonly known as: LIPITOR      Take 1 tablet by mouth Daily.       esomeprazole 40 MG capsule  Commonly known as: nexIUM      Take 1 capsule by mouth 2 (two) times a day.       losartan-hydrochlorothiazide 50-12.5 MG per tablet  Commonly known as: HYZAAR      TAKE 1 TABLET EVERY DAY        metoprolol succinate XL 25 MG 24 hr tablet  Commonly known as: TOPROL-XL      Take 0.5 tablets by mouth Daily.          STOP taking these medications    cetirizine 10 MG tablet  Commonly known as: zyrTEC  Stopped by: Kisha Berman MD        montelukast 10 MG tablet  Commonly known as: SINGULAIR  Stopped by: Kisha Berman MD               Patient is no longer taking zyrtec, singulair.  I corrected the med list to reflect this.  I did not stop these medications.      Dictated utilizing Dragon dictation

## 2021-10-07 ENCOUNTER — TELEPHONE (OUTPATIENT)
Dept: INTERNAL MEDICINE | Facility: CLINIC | Age: 66
End: 2021-10-07

## 2021-10-07 DIAGNOSIS — Z12.5 PROSTATE CANCER SCREENING: ICD-10-CM

## 2021-10-07 DIAGNOSIS — E78.2 MIXED HYPERLIPIDEMIA: ICD-10-CM

## 2021-10-07 DIAGNOSIS — K21.00 GASTROESOPHAGEAL REFLUX DISEASE WITH ESOPHAGITIS WITHOUT HEMORRHAGE: ICD-10-CM

## 2021-10-07 DIAGNOSIS — I10 HYPERTENSION, ESSENTIAL: Primary | ICD-10-CM

## 2021-10-07 PROBLEM — J02.9 SORE THROAT: Status: RESOLVED | Noted: 2020-12-22 | Resolved: 2021-10-07

## 2021-10-07 PROBLEM — Z23 IMMUNIZATION DUE: Status: RESOLVED | Noted: 2019-04-30 | Resolved: 2021-10-07

## 2021-10-07 PROBLEM — B99.9 FEVER DUE TO INFECTION: Status: RESOLVED | Noted: 2020-12-22 | Resolved: 2021-10-07

## 2021-10-21 ENCOUNTER — OFFICE VISIT (OUTPATIENT)
Dept: INTERNAL MEDICINE | Facility: CLINIC | Age: 66
End: 2021-10-21

## 2021-10-21 VITALS
BODY MASS INDEX: 25.94 KG/M2 | SYSTOLIC BLOOD PRESSURE: 132 MMHG | OXYGEN SATURATION: 98 % | WEIGHT: 171.2 LBS | HEART RATE: 97 BPM | RESPIRATION RATE: 19 BRPM | HEIGHT: 68 IN | DIASTOLIC BLOOD PRESSURE: 82 MMHG | TEMPERATURE: 97.7 F

## 2021-10-21 DIAGNOSIS — Z12.5 SCREENING FOR PROSTATE CANCER: ICD-10-CM

## 2021-10-21 DIAGNOSIS — I25.10 CORONARY ARTERY DISEASE INVOLVING NATIVE CORONARY ARTERY OF NATIVE HEART WITHOUT ANGINA PECTORIS: ICD-10-CM

## 2021-10-21 DIAGNOSIS — E78.2 MIXED HYPERLIPIDEMIA: ICD-10-CM

## 2021-10-21 DIAGNOSIS — K21.9 GERD WITHOUT ESOPHAGITIS: ICD-10-CM

## 2021-10-21 DIAGNOSIS — Z00.00 ENCOUNTER FOR INITIAL ANNUAL WELLNESS VISIT (AWV) IN MEDICARE PATIENT: Primary | ICD-10-CM

## 2021-10-21 DIAGNOSIS — Z23 NEED FOR IMMUNIZATION AGAINST INFLUENZA: ICD-10-CM

## 2021-10-21 DIAGNOSIS — I10 HYPERTENSION, ESSENTIAL: ICD-10-CM

## 2021-10-21 PROBLEM — B37.89 CANDIDA RASH OF GROIN: Status: RESOLVED | Noted: 2020-05-20 | Resolved: 2021-10-21

## 2021-10-21 LAB
BILIRUB BLD-MCNC: NEGATIVE MG/DL
CLARITY, POC: CLEAR
COLOR UR: YELLOW
EXPIRATION DATE: NORMAL
GLUCOSE UR STRIP-MCNC: NEGATIVE MG/DL
KETONES UR QL: NEGATIVE
LEUKOCYTE EST, POC: NEGATIVE
Lab: NORMAL
NITRITE UR-MCNC: NEGATIVE MG/ML
PH UR: 7 [PH] (ref 5–8)
PROT UR STRIP-MCNC: NEGATIVE MG/DL
RBC # UR STRIP: NEGATIVE /UL
SP GR UR: 1.02 (ref 1–1.03)
UROBILINOGEN UR QL: NORMAL

## 2021-10-21 PROCEDURE — 99214 OFFICE O/P EST MOD 30 MIN: CPT | Performed by: NURSE PRACTITIONER

## 2021-10-21 PROCEDURE — G0008 ADMIN INFLUENZA VIRUS VAC: HCPCS | Performed by: NURSE PRACTITIONER

## 2021-10-21 PROCEDURE — 81003 URINALYSIS AUTO W/O SCOPE: CPT | Performed by: NURSE PRACTITIONER

## 2021-10-21 PROCEDURE — 90662 IIV NO PRSV INCREASED AG IM: CPT | Performed by: NURSE PRACTITIONER

## 2021-10-21 PROCEDURE — 1170F FXNL STATUS ASSESSED: CPT | Performed by: NURSE PRACTITIONER

## 2021-10-21 PROCEDURE — G0438 PPPS, INITIAL VISIT: HCPCS | Performed by: NURSE PRACTITIONER

## 2021-10-21 PROCEDURE — 1160F RVW MEDS BY RX/DR IN RCRD: CPT | Performed by: NURSE PRACTITIONER

## 2021-10-21 PROCEDURE — 1126F AMNT PAIN NOTED NONE PRSNT: CPT | Performed by: NURSE PRACTITIONER

## 2021-10-21 NOTE — PROGRESS NOTES
QUICK REFERENCE INFORMATION:  The ABCs of Providing the Annual Wellness Visit   CMS.Formerly [facility]    Medicare Annual Wellness Visit      Subjective   History of Present Illness    Chino Huber is a 65 y.o. male who presents for an Annual Wellness Visit. In addition, we addressed the following health issues:    HTN, CAD, GERD    He is on Losartan 100/25mg daily and Toprol XL 25mg  for HTN. He takes Lipitor 80mg for cholesterol control and Nexium 40mg daily for GERD. He checks his BP at home, running 120s-132/80s. He denies CP, SOA, edema. He is followed by Dr. Berman for Aortic valve stenosis. He walks a mile per day, and works on planes and cars.    Office Visit with Kisha Berman MD (09/08/2021)      PMH, PSH, SocHx, FamHx, Allergies, and Medications: Reviewed and updated in the Visit Navigator.     Outpatient Medications Prior to Visit   Medication Sig Dispense Refill   • aspirin 81 MG chewable tablet Chew 81 mg 2 (Two) Times a Day.     • atorvastatin (LIPITOR) 80 MG tablet Take 1 tablet by mouth Daily. 90 tablet 3   • esomeprazole (nexIUM) 40 MG capsule Take 1 capsule by mouth 2 (two) times a day. 180 capsule 3   • losartan-hydrochlorothiazide (HYZAAR) 50-12.5 MG per tablet TAKE 1 TABLET EVERY DAY 90 tablet 3   • metoprolol succinate XL (TOPROL-XL) 25 MG 24 hr tablet Take 0.5 tablets by mouth Daily. 45 tablet 3   • mometasone (NASONEX) 50 MCG/ACT nasal spray 2 sprays into the nostril(s) as directed by provider Daily. (Patient taking differently: 2 sprays into the nostril(s) as directed by provider As Needed.) 17 g 0     No facility-administered medications prior to visit.       Patient Active Problem List   Diagnosis   • Coronary artery disease involving native coronary artery of native heart without angina pectoris   • Gastroesophageal reflux disease   • Hyperlipidemia   • Hypertension, essential   • Aortic root dilatation (HCC)   • Nonrheumatic aortic valve stenosis   • Bicuspid aortic valve   • Thoracic  aortic aneurysm without rupture (HCC)   • Healthcare maintenance   • Environmental allergies   • Cough due to ACE inhibitor   • Globus sensation   • Dysphagia   • Adenomatous polyp of colon   • FH: colon cancer in first degree relative <60 years old   • Candida rash of groin   • Ruptured aneurysm of thoracic aorta (HCC)       Health Habits:  Dental Exam. up to date  Eye Exam. up to date  Exercise: 5 times/week.  Current exercise activities include: walking    Social:  See review in SnapShot activity and in SocHx section of Visit Navigator.    Health Risk Assessment:  The patient has completed a Health Risk Assessment. This has been reviewed with them and has been scanned into Media Manager as a separate document.    Current Medical Providers:  Patient Care Team:  Laura Barron APRN as PCP - General (Family Medicine)  Kisha Berman MD as Consulting Physician (Cardiology)  Michael Martinez Jr., MD as Consulting Physician (Urology)  Fam Rose MD as Consulting Physician (Gastroenterology)    The [facility] providers who are involved in the care of this patient are listed above. Additional providers and suppliers are listed below:  Dr. Berman    Recent Hospitalizations:  No hospitalization(s) within the last year..    Age-appropriate Screening Schedule:  Refer to the list below for future screening recommendations based on patient's age. Orders for these recommended tests are listed in the plan section. The patient has been provided with a written plan.    Health Maintenance   Topic Date Due   • ANNUAL WELLNESS VISIT  Never done   • INFLUENZA VACCINE  08/01/2021   • GASTROSCOPY (EGD)  01/31/2022   • LIPID PANEL  10/14/2022   • COLORECTAL CANCER SCREENING  01/31/2023   • Pneumococcal Vaccine 65+ (2 of 2 - PPSV23) 04/30/2024   • TDAP/TD VACCINES (3 - Td or Tdap) 08/01/2028   • HEPATITIS C SCREENING  Completed   • COVID-19 Vaccine  Completed   • ZOSTER VACCINE  Completed       Depression Screen:   PHQ-2/PHQ-9  "Depression Screening 10/21/2021   Little interest or pleasure in doing things 0   Feeling down, depressed, or hopeless 0   Total Score 0       Functional and Cognitive Screening:  No flowsheet data found.    Does the patient have evidence of cognitive impairment? No    Advanced Care Planning:  ACP discussion was held with the patient during this visit. Patient has an advance directive (not in EMR), copy requested.    Identification of Risk Factors:  Risk factors include: Advance Directive Discussion  Breast Cancer/Mammogram Screening  Cardiovascular risk  Colon Cancer Screening  Immunizations Discussed/Encouraged (specific immunizations; Influenza ).    Review of Systems   Constitutional: Negative.    HENT: Negative.    Eyes: Negative.    Respiratory: Negative.    Cardiovascular: Negative.    Gastrointestinal: Negative.    Endocrine: Negative.    Musculoskeletal: Negative.    Skin: Negative.    Allergic/Immunologic: Negative.    Neurological: Negative.    Hematological: Negative.    Psychiatric/Behavioral: Negative.    All other systems reviewed and are negative.      /70   Pulse 97   Temp 97.7 °F (36.5 °C) (Temporal)   Resp 19   Ht 172.7 cm (68\")   Wt 77.7 kg (171 lb 3.2 oz)   SpO2 98%   BMI 26.03 kg/m²     General Appearance:    Alert, cooperative, no distress, appears stated age   Head:    Normocephalic, without obvious abnormality, atraumatic   Eyes:    PERRL, conjunctiva/corneas clear, EOM's intact, fundi     benign, both eyes        Ears:    Normal TM's and external ear canals, both ears   Nose:   Nares normal, septum midline, mucosa normal, no drainage    or sinus tenderness   Throat:   Lips, mucosa, and tongue normal; teeth and gums normal   Neck:   Supple, symmetrical, trachea midline, no adenopathy;        thyroid:  No enlargement/tenderness/nodules; no carotid    bruit or JVD   Back:     Symmetric, no curvature, ROM normal, no CVA tenderness   Lungs:     Clear to auscultation bilaterally, " "respirations unlabored   Chest wall:    No tenderness or deformity   Heart:    Regular rate and rhythm, S1 and S2 normal, no murmur, rub    or gallop   Abdomen:     Soft, non-tender, bowel sounds active all four quadrants,     no masses, no organomegaly   Genitalia:    deferred   Rectal:    deferred   Extremities:   Extremities normal, atraumatic, no cyanosis or edema   Pulses:   2+ and symmetric all extremities   Skin:   Skin color, texture, turgor normal, no rashes or lesions   Lymph nodes:   Cervical, supraclavicular, and axillary nodes normal   Neurologic:   CNII-XII intact. Normal strength, sensation and reflexes       throughout     Objective     Vitals:    10/21/21 1249   BP: 140/70   Pulse: 97   Resp: 19   Temp: 97.7 °F (36.5 °C)   TempSrc: Temporal   SpO2: 98%   Weight: 77.7 kg (171 lb 3.2 oz)   Height: 172.7 cm (68\")       Body mass index is 26.03 kg/m².    Assessment/Plan   Patient Self-Management and Personalized Health Advice  The patient has been provided with information about: exercise, weight management and prevention of cardiac or vascular disease and preventive services including:   · Annual Wellness Visit (AWV)  · Cardiovascular Disease Screening Tests (may do this order every 5 years in beneficiaries without signs or symptoms of cardiovascular disease).    Discussed the patient's BMI with him. The BMI is in the acceptable range.    Orders:       Diagnosis Plan   1. Encounter for initial annual wellness visit (AWV) in Medicare patient  POCT urinalysis dipstick, automated   2. Coronary artery disease involving native coronary artery of native heart without angina pectoris  CBC & Differential    Comprehensive Metabolic Panel    Lipid Panel With / Chol / HDL Ratio    followed by Dr. Berman   3. Mixed hyperlipidemia  CBC & Differential    Comprehensive Metabolic Panel    Lipid Panel With / Chol / HDL Ratio    LDL goal < 70, controllled on Statin therapy   4. Hypertension, essential  CBC & Differential "    Comprehensive Metabolic Panel    Lipid Panel With / Chol / HDL Ratio    BP goal < 135/85, well controlled on Losartan and Toprol XL   5. GERD without esophagitis  CBC & Differential    Comprehensive Metabolic Panel    Well controlled on nexium 40mg    6. Screening for prostate cancer  PSA Screen   7. Need for immunization against influenza  Fluzone High-Dose 65+yrs (0952-4785)       Follow Up:    12 months w labs    An After Visit Summary and PPPS with all of these plans were given to the patient.

## 2021-10-21 NOTE — PROGRESS NOTES
Injection  Injection performed by Mellissa Wong MA. Patient tolerated the procedure well without complications.  10/21/21   Mellissa Wong MA

## 2021-11-24 RX ORDER — METOPROLOL SUCCINATE 25 MG/1
TABLET, EXTENDED RELEASE ORAL
Qty: 45 TABLET | Refills: 3 | Status: SHIPPED | OUTPATIENT
Start: 2021-11-24 | End: 2021-12-17 | Stop reason: SDUPTHER

## 2021-12-09 RX ORDER — ATORVASTATIN CALCIUM 80 MG/1
TABLET, FILM COATED ORAL
Qty: 90 TABLET | Refills: 3 | Status: SHIPPED | OUTPATIENT
Start: 2021-12-09 | End: 2022-09-27

## 2021-12-09 RX ORDER — ESOMEPRAZOLE MAGNESIUM 40 MG/1
CAPSULE, DELAYED RELEASE ORAL
Qty: 180 CAPSULE | Refills: 3 | Status: SHIPPED | OUTPATIENT
Start: 2021-12-09 | End: 2022-12-29

## 2021-12-17 RX ORDER — LOSARTAN POTASSIUM AND HYDROCHLOROTHIAZIDE 12.5; 5 MG/1; MG/1
1 TABLET ORAL DAILY
Qty: 90 TABLET | Refills: 3 | Status: SHIPPED | OUTPATIENT
Start: 2021-12-17 | End: 2022-12-29

## 2021-12-17 RX ORDER — METOPROLOL SUCCINATE 25 MG/1
12.5 TABLET, EXTENDED RELEASE ORAL DAILY
Qty: 45 TABLET | Refills: 3 | Status: SHIPPED | OUTPATIENT
Start: 2021-12-17 | End: 2022-12-29

## 2022-01-18 ENCOUNTER — OFFICE VISIT (OUTPATIENT)
Dept: INTERNAL MEDICINE | Facility: CLINIC | Age: 67
End: 2022-01-18

## 2022-01-18 VITALS
SYSTOLIC BLOOD PRESSURE: 124 MMHG | HEIGHT: 68 IN | OXYGEN SATURATION: 99 % | BODY MASS INDEX: 25.91 KG/M2 | TEMPERATURE: 98.6 F | RESPIRATION RATE: 20 BRPM | HEART RATE: 62 BPM | DIASTOLIC BLOOD PRESSURE: 74 MMHG | WEIGHT: 171 LBS

## 2022-01-18 DIAGNOSIS — J40 BRONCHITIS: Primary | ICD-10-CM

## 2022-01-18 DIAGNOSIS — R05.9 COUGH: ICD-10-CM

## 2022-01-18 DIAGNOSIS — R09.81 NASAL CONGESTION: ICD-10-CM

## 2022-01-18 DIAGNOSIS — B35.6 TINEA OF SCROTUM: ICD-10-CM

## 2022-01-18 PROCEDURE — 99213 OFFICE O/P EST LOW 20 MIN: CPT | Performed by: NURSE PRACTITIONER

## 2022-01-18 RX ORDER — FLUCONAZOLE 150 MG/1
150 TABLET ORAL ONCE
Qty: 1 TABLET | Refills: 0 | Status: SHIPPED | OUTPATIENT
Start: 2022-01-18 | End: 2022-01-18

## 2022-01-18 RX ORDER — PREDNISONE 20 MG/1
20 TABLET ORAL DAILY
Qty: 6 TABLET | Refills: 0 | Status: SHIPPED | OUTPATIENT
Start: 2022-01-18 | End: 2022-01-19 | Stop reason: SDUPTHER

## 2022-01-18 RX ORDER — AZITHROMYCIN 250 MG/1
TABLET, FILM COATED ORAL
Qty: 6 TABLET | Refills: 0 | Status: SHIPPED | OUTPATIENT
Start: 2022-01-18 | End: 2022-01-19 | Stop reason: SDUPTHER

## 2022-01-18 NOTE — PROGRESS NOTES
Chief Complaint   Patient presents with   • Nasal Congestion   • Cough       Subjective     Chino Huber is a 66 y.o. male being seen for an acute appointment for URI symptoms for 4 weeks. This started the middle of December with sore throat, cough, and nasal congestion. He assumed it was Covid, but took 3 home tests with negative results. Cough is worse at night.  He denies wheezing, chest tightness, fevers, or SOA.       History of Present Illness     Allergies   Allergen Reactions   • Lisinopril Cough         Current Outpatient Medications:   •  aspirin 81 MG chewable tablet, Chew 81 mg 2 (Two) Times a Day., Disp: , Rfl:   •  atorvastatin (LIPITOR) 80 MG tablet, TAKE 1 TABLET EVERY DAY, Disp: 90 tablet, Rfl: 3  •  esomeprazole (nexIUM) 40 MG capsule, TAKE 1 CAPSULE BY MOUTH 2  TIMES A DAY., Disp: 180 capsule, Rfl: 3  •  losartan-hydrochlorothiazide (HYZAAR) 50-12.5 MG per tablet, Take 1 tablet by mouth Daily., Disp: 90 tablet, Rfl: 3  •  metoprolol succinate XL (TOPROL-XL) 25 MG 24 hr tablet, Take 0.5 tablets by mouth Daily., Disp: 45 tablet, Rfl: 3  •  mometasone (NASONEX) 50 MCG/ACT nasal spray, 2 sprays into the nostril(s) as directed by provider Daily. (Patient taking differently: 2 sprays into the nostril(s) as directed by provider As Needed.), Disp: 17 g, Rfl: 0    The following portions of the patient's history were reviewed and updated as appropriate: allergies, current medications, past family history, past medical history, past social history, past surgical history and problem list.    Review of Systems   Constitutional: Negative.    HENT: Positive for congestion, hearing loss, rhinorrhea, sinus pressure and sinus pain.    Eyes: Negative.    Respiratory: Positive for cough and shortness of breath. Negative for wheezing.    Cardiovascular: Negative for chest pain, palpitations and leg swelling.   Endocrine: Negative.    Musculoskeletal: Negative.  Negative for arthralgias.   Allergic/Immunologic:  Negative.  Negative for environmental allergies.   Neurological: Negative.    Hematological: Negative.    Psychiatric/Behavioral: Negative.    All other systems reviewed and are negative.      Assessment     Physical Exam  Vitals reviewed.   Constitutional:       Appearance: Normal appearance.   HENT:      Head: Normocephalic.      Right Ear: Tympanic membrane normal.      Left Ear: Tympanic membrane normal.      Nose: No congestion or rhinorrhea.   Cardiovascular:      Rate and Rhythm: Normal rate and regular rhythm.   Pulmonary:      Effort: Pulmonary effort is normal. No respiratory distress.      Breath sounds: Normal breath sounds. No stridor.   Musculoskeletal:      Cervical back: Neck supple.      Right lower leg: No edema.      Left lower leg: No edema.   Skin:     General: Skin is warm and dry.   Neurological:      General: No focal deficit present.      Mental Status: He is alert and oriented to person, place, and time.   Psychiatric:         Mood and Affect: Mood normal.         Behavior: Behavior normal.         Thought Content: Thought content normal.         Plan         Diagnoses and all orders for this visit:    1. Bronchitis (Primary)  -     COVID-19,[facility] ROUTINE, NP/OP SWAB IN TRANSPORT MEDIA OR ESWAB 72 HR TAT - Swab, Nasopharynx  -     predniSONE (DELTASONE) 20 MG tablet; Take 1 tablet by mouth Daily.  Dispense: 6 tablet; Refill: 0  -     azithromycin (Zithromax) 250 MG tablet; Take 2 tablets the first day, then 1 tablet daily for 4 days.  Dispense: 6 tablet; Refill: 0    2. Nasal congestion  -     COVID-19,[facility] ROUTINE, NP/OP SWAB IN TRANSPORT MEDIA OR ESWAB 72 HR TAT - Swab, Nasopharynx    3. Cough  -     HYDROcod Polst-CPM Polst ER (Tussionex Pennkinetic ER) 10-8 MG/5ML ER suspension; Take 5 mL by mouth Every 12 (Twelve) Hours As Needed for Cough.  Dispense: 118 mL; Refill: 0    4. Tinea of scrotum  -     fluconazole (Diflucan) 150 MG tablet; Take 1 tablet by mouth 1 (One) Time for 1  dose.  Dispense: 1 tablet; Refill: 0    Will call with test results

## 2022-01-19 DIAGNOSIS — R05.9 COUGH: ICD-10-CM

## 2022-01-19 DIAGNOSIS — J40 BRONCHITIS: ICD-10-CM

## 2022-01-19 LAB
LABCORP SARS-COV-2, NAA 2 DAY TAT: NORMAL
SARS-COV-2 RNA RESP QL NAA+PROBE: NOT DETECTED

## 2022-01-19 RX ORDER — PREDNISONE 20 MG/1
20 TABLET ORAL DAILY
Qty: 6 TABLET | Refills: 0 | Status: SHIPPED | OUTPATIENT
Start: 2022-01-19 | End: 2022-03-10 | Stop reason: ALTCHOICE

## 2022-01-19 RX ORDER — AZITHROMYCIN 250 MG/1
TABLET, FILM COATED ORAL
Qty: 6 TABLET | Refills: 0 | Status: SHIPPED | OUTPATIENT
Start: 2022-01-19 | End: 2022-03-10 | Stop reason: ALTCHOICE

## 2022-01-19 NOTE — TELEPHONE ENCOUNTER
Rx Refill Note  Requested Prescriptions     Pending Prescriptions Disp Refills    azithromycin (Zithromax) 250 MG tablet 6 tablet 0     Sig: Take 2 tablets the first day, then 1 tablet daily for 4 days.    predniSONE (DELTASONE) 20 MG tablet 6 tablet 0     Sig: Take 1 tablet by mouth Daily.    HYDROcod Polst-CPM Polst ER (Tussionex Pennkinetic ER) 10-8 MG/5ML ER suspension 118 mL 0     Sig: Take 5 mL by mouth Every 12 (Twelve) Hours As Needed for Cough.      Last office visit with prescribing clinician: 1/18/2022      Next office visit with prescribing clinician: 10/24/2022            Mellissa Rowe MA  01/19/22, 10:11 EST

## 2022-01-19 NOTE — TELEPHONE ENCOUNTER
PATIENT IS CALLING BECAUSE HE IS STRESSED ABOUT GETTING THESE ASAP. PATIENT IS LEAVING TOWN TODAY AFTER LUNCH TIME. PLEASE GIVE HIM A CALL AS SOON AS THIS IS DONE. [phone]

## 2022-03-10 NOTE — PROGRESS NOTES
Date of Office Visit: 2022  Encounter Provider: Vania Glaser, JOHNNY, APRN  Place of Service: [facility] CARDIOLOGY  Patient Name: Chino Huber  :1955        Subjective:     Chief Complaint:  Coronary artery disease, thoracic aortic ectasia, aortic stenosis      History of Present Illness:  Chino Huber is a 66 y.o. male patient of Dr. Berman.  I am seeing this patient in the office today and I have reviewed his records.    Patient has a history of prostate cancer, hypertension, hyperlipidemia, coronary artery disease, aortic stenosis.    PER PREVIOUS OFFICE NOTE: In 2014 patient was noted to have coronary  artery disease when he presented with chest pain and abnormal stress test. A cardiac  catheterization revealed 90% stenosis of the circumflex artery and of the right coronary  artery. The left anterior descending and left main were normal. He underwent drug-coated  stent placement to both the circumflex and the right coronary artery.  He had a CT angio of his chest in May 2015 that revealed stable thoracic aneurysm measuring 4.0 cm.  In 2018 he had an echocardiogram that showed normal systolic function with mild left atrial enlargement, bicuspid aortic valve with mild stenosis with a mean gradient of 10 mmHg and mildly dilated asending aorta measuring 3.6 cm.  CT angiogram of the chest showed stable dilated a sending aorta measuring 4.1 cm at the ascending aortic level.  In 2020 MR angiography of the chest showed asending aortic aneurysm measuring 3.7 cm overall unchanged from CT study where it measured 4.1 cm.  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  He had a stress echocardiogram 2020 that showed no ischemia at a good workload.  LV systolic function was normal with grade 1 diastolic functions that was mild to moderate aortic valve stenosis with a valve area of 1.47 square to mean gradient of 23 mmHg.  RV  systolic pressure was normal.  He had a limited echocardiogram a month ago that showed normal systolic and diastolic function with valve area of 1.5 cm.  Mean gradient of 15 mmHg and a sending aorta measuring 4.1 cm.  Images were adequate.  THE FOLLOWING IS MY CONTRIBUTION TO NOTE: He was last seen in the office by Dr. Berman 9/2021 at which point repeat MRI chest was recommended at 6-month follow-up.        Patient presents to office today for follow-up appointment.  Patient reports he is doing well since last visit.  He had a respiratory virus about a month ago and has been less active since then.  He is now feeling recovered and we discussed easing back into some light exercise and increasing as tolerated.  He denies any exertional symptoms or concerns.  Denies any chest pain or discomfort, palpitations, shortness of breath, shortness of breath with exertion, edema, dizziness, syncope, near syncope, falls, fatigue, or abnormal bleeding.  Blood pressure well controlled at home, about the same as today.  Heart rate usually lower at home but he is anxious today as he thinks that there was a prowler outside his home last night.            Past Medical History:   Diagnosis Date   • Acute sinusitis    • Allergic 1969   • Aortic root dilatation (HCC)    • Aortic stenosis    • Aortic valve stenosis     mild   • Arteriosclerotic coronary artery disease    • Bicuspid aortic valve    • CAD (coronary artery disease)    • Cancer (HCC) 2009    Prostate   • Chest pain    • Colon polyp    • Cough    • Dilated aortic root (HCC)    • ED (erectile dysfunction)    • Erectile dysfunction 2009    Prostatectomy   • Folliculitis    • GERD (gastroesophageal reflux disease)    • Heart murmur 2013    Aortic bicuspid   • Heartburn    • Hyperlipidemia    • Hypertension    • Nonspecific abnormal findings on radiological and examination of intrathoracic organs    • Precordial pain    • Reflux esophagitis    • Sore throat    • Thoracic aortic  aneurysm without rupture (HCC)    • Throat pain      Past Surgical History:   Procedure Laterality Date   • ABDOMINAL SURGERY  Prostate   • CARDIAC CATHETERIZATION  6/2013    Stent placement   • CLOSED REDUCTION FOREARM FRACTURE Right    • COLONOSCOPY     • COLONOSCOPY     • COLONOSCOPY N/A 2/3/2017    Procedure: COLONOSCOPY TO CECUM WITH HOT SNARE POLYPECTOMY WITH NORMAL SALINE INJECTION AND  TWO RESOLUTION CLIPS;  Surgeon: Fam LILLY MD;  Location: [facility] ENDOSCOPY;  Service:    • COLONOSCOPY N/A 1/31/2020    Procedure: COLONOSCOPY TO TI  WITH POLYPECTOMY (COLD SNARE);  Surgeon: Fam Rose MD;  Location: [facility] ENDOSCOPY;  Service: Gastroenterology   • CORONARY ANGIOPLASTY WITH STENT PLACEMENT     • CORONARY STENT PLACEMENT  6/2013   • ENDOSCOPY N/A 3/15/2017    Procedure: ESOPHAGOGASTRODUODENOSCOPY WITH BIOPSIES (COLD);  Surgeon: Fam LILLY MD;  Location: [facility] ENDOSCOPY;  Service:    • ENDOSCOPY N/A 1/31/2020    Procedure: ESOPHAGOGASTRODUODENOSCOPY WITH BIOPSIES;  Surgeon: Fam Rose MD;  Location: [facility] ENDOSCOPY;  Service: Gastroenterology   • LASIK Bilateral    • PROSTATE SURGERY     • PROSTATE SURGERY     • RHINOPLASTY     • RHINOPLASTY     • SINUS SURGERY  1987/1988   • TONSILLECTOMY  1962   • UPPER GASTROINTESTINAL ENDOSCOPY  3/2017     Outpatient Medications Prior to Visit   Medication Sig Dispense Refill   • aspirin 81 MG chewable tablet Chew 81 mg 2 (Two) Times a Day.     • atorvastatin (LIPITOR) 80 MG tablet TAKE 1 TABLET EVERY DAY 90 tablet 3   • esomeprazole (nexIUM) 40 MG capsule TAKE 1 CAPSULE BY MOUTH 2  TIMES A DAY. 180 capsule 3   • losartan-hydrochlorothiazide (HYZAAR) 50-12.5 MG per tablet Take 1 tablet by mouth Daily. 90 tablet 3   • metoprolol succinate XL (TOPROL-XL) 25 MG 24 hr tablet Take 0.5 tablets by mouth Daily. 45 tablet 3   • azithromycin (Zithromax) 250 MG tablet Take 2 tablets the first day, then 1 tablet daily for 4 days. 6 tablet 0    • HYDROcod Polst-CPM Polst ER (Tussionex Pennkinetic ER) 10-8 MG/5ML ER suspension Take 5 mL by mouth Every 12 (Twelve) Hours As Needed for Cough. 118 mL 0   • mometasone (NASONEX) 50 MCG/ACT nasal spray 2 sprays into the nostril(s) as directed by provider Daily. (Patient taking differently: 2 sprays into the nostril(s) as directed by provider As Needed.) 17 g 0   • predniSONE (DELTASONE) 20 MG tablet Take 1 tablet by mouth Daily. 6 tablet 0     No facility-administered medications prior to visit.       Allergies as of 2022 - Reviewed 2022   Allergen Reaction Noted   • Lisinopril Cough 2019     Social History     Socioeconomic History   • Marital status:    Tobacco Use   • Smoking status: Never Smoker   • Smokeless tobacco: Never Used   • Tobacco comment: caffeine use   Substance and Sexual Activity   • Alcohol use: Yes     Alcohol/week: 1.0 standard drink     Types: 1 Cans of beer per week   • Drug use: No   • Sexual activity: Not Currently     Partners: Female     Birth control/protection: Surgical     Comment: Vasectomy, Prostatectomy     Family History   Problem Relation Age of Onset   • Heart disease Mother    • Hypertension Mother    • Colon cancer Mother          due to colon cancer   • Cancer Mother         colon   • Hearing loss Mother    • Heart disease Father    • Hypertension Father    • Other Father         CABG, heart valve replacement   • Coronary artery disease Father    • Heart failure Father    • Cancer Father         prostate   • Glaucoma Father    • Cancer Sister         Breast   • Hypertension Sister    • Colon polyps Sister         Recent polyp remival   • Cancer Sister         Breast   • Hypertension Sister    • Malig Hyperthermia Neg Hx          Review of Systems   Constitutional: Negative for malaise/fatigue.   Cardiovascular:        SEE HPI   Respiratory: Negative for shortness of breath.    Hematologic/Lymphatic: Negative for bleeding problem.  "  Musculoskeletal: Negative for falls.   Gastrointestinal: Negative for melena.   Genitourinary: Negative for hematuria.   Neurological: Negative for dizziness.   Psychiatric/Behavioral: Negative for altered mental status.            Objective:     Vitals:    03/11/22 0950   BP: 125/74   BP Location: Left arm   Patient Position: Sitting   Pulse: 81   Weight: 78.5 kg (173 lb)   Height: 172.7 cm (68\")     Body mass index is 26.3 kg/m².        PHYSICAL EXAM:  Constitutional:       General: Not in acute distress.     Appearance: Well-developed. Not diaphoretic.   Eyes:      Pupils: Pupils are equal, round, and reactive to light.   HENT:      Head: Normocephalic and atraumatic.   Neck:      Vascular: No JVD.   Pulmonary:      Effort: Pulmonary effort is normal. No respiratory distress.      Breath sounds: Normal breath sounds. No wheezing. No rales.   Cardiovascular:      Normal rate. Regular rhythm.      Murmurs: There is no murmur.      No gallop. No click. No rub.   Edema:     Peripheral edema absent.   Abdominal:      General: Bowel sounds are normal. There is no distension.      Palpations: Abdomen is soft.   Musculoskeletal: Normal range of motion.         General: No tenderness or deformity.      Cervical back: Neck supple. Skin:     General: Skin is warm and dry.      Findings: No erythema or rash.   Neurological:      Mental Status: Alert and oriented to person, place, and time.   Psychiatric:         Behavior: Behavior normal.         Judgment: Judgment normal.             ECG 12 Lead    Date/Time: 3/11/2022 10:54 AM  Performed by: Vania Glaser DNP, APRN  Authorized by: Vania Glaser DNP, APRN   Comparison: compared with previous ECG from 9/8/2021  Rhythm: sinus rhythm  BPM: 87  Comments: No significant changes from previous EKGs              Assessment:       Diagnosis Plan   1. Coronary artery disease involving native coronary artery of native heart without angina pectoris     2. Hypertension, " essential     3. Thoracic aortic aneurysm without rupture (HCC)  MRI Angiogram Chest With & Without Contrast         Plan:     1. Coronary artery disease: With history of stenting in 2014.  Negative stress echo 8/2020.  On low-dose aspirin, metoprolol, and high intensity statin therapy.  2. Hypertension: Controlled.  Patient continue to monitor and call if staying greater than 130/80 on average.  3. Thoracic aortic ectasia: Blood pressure well controlled.  Goal less than 130/80.  Patient aware to avoid repetitive heavy lifting over 30 pounds.  Plan for MRI angiogram chest, per plan of care outlined by MD last office visit  4. Aortic stenosis: Mild to moderate on 8/2021 echo.  5. Hyperlipidemia: Managed by outside provider.  LDL goal less than 70.  On high intensity statin therapy.  Well-controlled on 10/2021 labs with total cholesterol 123, triglycerides 129, HDL 37, LDL 63.    Patient to keep September follow-up with Dr. Berman as scheduled or follow-up sooner if needed for any new or worsening symptoms or concerns.  Discussed in detail signs/symptoms that warrant sooner call or follow-up to the office or ER visit.           Your medication list          Accurate as of March 11, 2022 10:53 AM. If you have any questions, ask your nurse or doctor.            CONTINUE taking these medications      Instructions Last Dose Given Next Dose Due   aspirin 81 MG chewable tablet      Chew 81 mg 2 (Two) Times a Day.       atorvastatin 80 MG tablet  Commonly known as: LIPITOR      TAKE 1 TABLET EVERY DAY       esomeprazole 40 MG capsule  Commonly known as: nexIUM      TAKE 1 CAPSULE BY MOUTH 2  TIMES A DAY.       losartan-hydrochlorothiazide 50-12.5 MG per tablet  Commonly known as: HYZAAR      Take 1 tablet by mouth Daily.       metoprolol succinate XL 25 MG 24 hr tablet  Commonly known as: TOPROL-XL      Take 0.5 tablets by mouth Daily.              I did NOT stop or change the above medications.  Patient's medication list was  updated to reflect medications they are currently taking including medication changes made by other providers.            Thanks,    Vania Glaser, JOHNNY, APRN  03/11/2022         Dictated utilizing Dragon dictation

## 2022-03-11 ENCOUNTER — OFFICE VISIT (OUTPATIENT)
Dept: CARDIOLOGY | Facility: CLINIC | Age: 67
End: 2022-03-11

## 2022-03-11 VITALS
SYSTOLIC BLOOD PRESSURE: 125 MMHG | DIASTOLIC BLOOD PRESSURE: 74 MMHG | BODY MASS INDEX: 26.22 KG/M2 | HEIGHT: 68 IN | WEIGHT: 173 LBS | HEART RATE: 81 BPM

## 2022-03-11 DIAGNOSIS — I25.10 CORONARY ARTERY DISEASE INVOLVING NATIVE CORONARY ARTERY OF NATIVE HEART WITHOUT ANGINA PECTORIS: Primary | ICD-10-CM

## 2022-03-11 DIAGNOSIS — I10 HYPERTENSION, ESSENTIAL: ICD-10-CM

## 2022-03-11 DIAGNOSIS — I71.20 THORACIC AORTIC ANEURYSM WITHOUT RUPTURE: ICD-10-CM

## 2022-03-11 PROCEDURE — 99214 OFFICE O/P EST MOD 30 MIN: CPT | Performed by: NURSE PRACTITIONER

## 2022-03-11 PROCEDURE — 93000 ELECTROCARDIOGRAM COMPLETE: CPT | Performed by: NURSE PRACTITIONER

## 2022-04-19 ENCOUNTER — HOSPITAL ENCOUNTER (OUTPATIENT)
Dept: MRI IMAGING | Facility: HOSPITAL | Age: 67
Discharge: HOME | End: 2022-04-19
Admitting: NURSE PRACTITIONER

## 2022-04-19 ENCOUNTER — TELEPHONE (OUTPATIENT)
Dept: CARDIOLOGY | Facility: CLINIC | Age: 67
End: 2022-04-19

## 2022-04-19 DIAGNOSIS — I71.20 THORACIC AORTIC ANEURYSM WITHOUT RUPTURE: ICD-10-CM

## 2022-04-19 PROCEDURE — 0 GADOBENATE DIMEGLUMINE 529 MG/ML SOLUTION: Performed by: NURSE PRACTITIONER

## 2022-04-19 PROCEDURE — 82565 ASSAY OF CREATININE: CPT

## 2022-04-19 PROCEDURE — A9577 INJ MULTIHANCE: HCPCS | Performed by: NURSE PRACTITIONER

## 2022-04-19 PROCEDURE — C8911 MRA W/O FOL W/CONT, CHEST: HCPCS

## 2022-04-19 RX ADMIN — GADOBENATE DIMEGLUMINE 15 ML: 529 INJECTION, SOLUTION INTRAVENOUS at 07:24

## 2022-04-19 NOTE — TELEPHONE ENCOUNTER
Chino Huber notified of results and recommendations; patient verbalized understanding.    Akilah Chaudhari RN  [facility] T[facility]

## 2022-04-19 NOTE — TELEPHONE ENCOUNTER
----- Message from Vania Glaser, JOHNNY, APRN sent at 4/19/2022  8:58 AM EDT -----  Please let patient know that thoracic aortic dilation appeared stable on recent follow-up MRI chest.  Continue with blood pressure control and call if blood pressure staying greater than 120s over 70s on average.  Continue to avoid Sudafed and repetitive heavy lifting.  Would keep September follow-up with Dr. Berman as scheduled or call sooner for issues or concerns.

## 2022-04-19 NOTE — PROGRESS NOTES
Please let patient know that thoracic aortic dilation appeared stable on recent follow-up MRI chest.  Continue with blood pressure control and call if blood pressure staying greater than 120s over 70s on average.  Continue to avoid Sudafed and repetitive heavy lifting.  Would keep September follow-up with Dr. Berman as scheduled or call sooner for issues or concerns.

## 2022-04-23 LAB — CREAT BLDA-MCNC: 1.1 MG/DL (ref 0.6–1.3)

## 2022-09-07 ENCOUNTER — OFFICE VISIT (OUTPATIENT)
Dept: CARDIOLOGY | Facility: CLINIC | Age: 67
End: 2022-09-07

## 2022-09-07 VITALS
SYSTOLIC BLOOD PRESSURE: 132 MMHG | WEIGHT: 164 LBS | BODY MASS INDEX: 24.86 KG/M2 | HEART RATE: 60 BPM | DIASTOLIC BLOOD PRESSURE: 80 MMHG | HEIGHT: 68 IN

## 2022-09-07 DIAGNOSIS — I35.0 NONRHEUMATIC AORTIC VALVE STENOSIS: ICD-10-CM

## 2022-09-07 DIAGNOSIS — I10 HYPERTENSION, ESSENTIAL: ICD-10-CM

## 2022-09-07 DIAGNOSIS — I77.810 AORTIC ROOT DILATATION: ICD-10-CM

## 2022-09-07 DIAGNOSIS — I71.20 THORACIC AORTIC ANEURYSM WITHOUT RUPTURE: ICD-10-CM

## 2022-09-07 DIAGNOSIS — E78.2 MIXED HYPERLIPIDEMIA: ICD-10-CM

## 2022-09-07 DIAGNOSIS — I25.10 CORONARY ARTERY DISEASE INVOLVING NATIVE CORONARY ARTERY OF NATIVE HEART WITHOUT ANGINA PECTORIS: Primary | ICD-10-CM

## 2022-09-07 PROCEDURE — 93000 ELECTROCARDIOGRAM COMPLETE: CPT | Performed by: INTERNAL MEDICINE

## 2022-09-07 PROCEDURE — 99213 OFFICE O/P EST LOW 20 MIN: CPT | Performed by: INTERNAL MEDICINE

## 2022-09-07 NOTE — PROGRESS NOTES
Date of Office Visit: 2022  Encounter Provider: Kisha Berman MD  Place of Service: [facility] CARDIOLOGY  Patient Name: Chino Huber  :1955    Chief complaint  Coronary artery disease, aortic aneurysm, aortic valve stenosis    History of Present Illness  The patient is a pleasant, 66 year-old gentleman with history of prostate cancer status  post resection, hypertension, and hyperlipidemia who in 2014 was noted to have coronary  artery disease when he presented with chest pain and abnormal stress test. A cardiac  catheterization revealed 90% stenosis of the circumflex artery and of the right coronary  artery. The left anterior descending and left main were normal. He underwent drug-coated  stent placement to both the circumflex and the right coronary artery.  He had a CT angio of his chest in May 2015 that revealed stable thoracic aneurysm measuring 4.0 cm.  In 2018 he had an echocardiogram that showed normal systolic function with mild left atrial enlargement, bicuspid aortic valve with mild stenosis with a mean gradient of 10 mmHg and mildly dilated asending aorta measuring 3.6 cm.  CT angiogram of the chest showed stable dilated a sending aorta measuring 4.1 cm at the ascending aortic level.  In 2020 MR angiography of the chest showed asending aortic aneurysm measuring 3.7 cm overall unchanged from CT study where it measured 4.1 cm.  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  He had a stress echocardiogram 2020 that showed no ischemia at a good workload.  LV systolic function was normal with grade 1 diastolic functions that was mild to moderate aortic valve stenosis with a valve area of 1.47 square to mean gradient of 23 mmHg.  RV systolic pressure was normal.  He had a limited echocardiogram a month ago that showed normal systolic and diastolic function with valve area of 1.5 cm.  Mean gradient of 15 mmHg and a  sending aorta measuring 4.1 cm.  Images were adequate.  Follow-up echocardiogram was performed on 8/9/2021 that showed normal systolic function with mild to moderate valve stenosis with mean gradient of 50 mmHg valve area 1.5 cm² ascending aorta measuring 4.1 cm.  Aortic root measuring 4 cm.  He had an MR angiogram of the chest 4/2022 that showed aortic root measuring 3.8 cm in the coronal view of the ascending aorta measuring 4.2 cm.  This is relatively unchanged by comparison to CT scan from 2015    Since last visit he is walking a mile a day he denies any chest pain, shortness of breath, palpitations, syncope near syncope.    No recent blood work on file    Past Medical History:   Diagnosis Date   • Acute sinusitis    • Allergic 1969   • Aortic root dilatation (HCC)    • Aortic stenosis    • Aortic valve stenosis     mild   • Arteriosclerotic coronary artery disease    • Bicuspid aortic valve    • CAD (coronary artery disease)    • Cancer (HCC) 2009    Prostate   • Chest pain    • Colon polyp    • Cough    • Dilated aortic root (HCC)    • ED (erectile dysfunction)    • Erectile dysfunction 2009    Prostatectomy   • Folliculitis    • GERD (gastroesophageal reflux disease)    • Heart murmur 2013    Aortic bicuspid   • Heartburn    • Hyperlipidemia    • Hypertension    • Nonspecific abnormal findings on radiological and examination of intrathoracic organs    • Precordial pain    • Reflux esophagitis    • Sore throat    • Thoracic aortic aneurysm without rupture (HCC)    • Throat pain      Past Surgical History:   Procedure Laterality Date   • ABDOMINAL SURGERY  Prostate   • CARDIAC CATHETERIZATION  6/2013    Stent placement   • CLOSED REDUCTION FOREARM FRACTURE Right    • COLONOSCOPY     • COLONOSCOPY     • COLONOSCOPY N/A 2/3/2017    Procedure: COLONOSCOPY TO CECUM WITH HOT SNARE POLYPECTOMY WITH NORMAL SALINE INJECTION AND  TWO RESOLUTION CLIPS;  Surgeon: Fam LILLY MD;  Location: [facility] ENDOSCOPY;   Service:    • COLONOSCOPY N/A 1/31/2020    Procedure: COLONOSCOPY TO TI  WITH POLYPECTOMY (COLD SNARE);  Surgeon: Fam Rose MD;  Location: [facility] ENDOSCOPY;  Service: Gastroenterology   • CORONARY ANGIOPLASTY WITH STENT PLACEMENT     • CORONARY STENT PLACEMENT  6/2013   • ENDOSCOPY N/A 3/15/2017    Procedure: ESOPHAGOGASTRODUODENOSCOPY WITH BIOPSIES (COLD);  Surgeon: Fam LILLY MD;  Location: [facility] ENDOSCOPY;  Service:    • ENDOSCOPY N/A 1/31/2020    Procedure: ESOPHAGOGASTRODUODENOSCOPY WITH BIOPSIES;  Surgeon: Fam Rose MD;  Location: [facility] ENDOSCOPY;  Service: Gastroenterology   • LASIK Bilateral    • PROSTATE SURGERY     • PROSTATE SURGERY     • RHINOPLASTY     • RHINOPLASTY     • SINUS SURGERY  1987/1988   • TONSILLECTOMY  1962   • UPPER GASTROINTESTINAL ENDOSCOPY  3/2017     Outpatient Medications Prior to Visit   Medication Sig Dispense Refill   • aspirin 81 MG chewable tablet Chew 81 mg 2 (Two) Times a Day.     • atorvastatin (LIPITOR) 80 MG tablet TAKE 1 TABLET EVERY DAY 90 tablet 3   • esomeprazole (nexIUM) 40 MG capsule TAKE 1 CAPSULE BY MOUTH 2  TIMES A DAY. 180 capsule 3   • losartan-hydrochlorothiazide (HYZAAR) 50-12.5 MG per tablet Take 1 tablet by mouth Daily. 90 tablet 3   • metoprolol succinate XL (TOPROL-XL) 25 MG 24 hr tablet Take 0.5 tablets by mouth Daily. 45 tablet 3   • predniSONE (DELTASONE) 10 MG (21) dose pack Use as directed on package 21 each 0     No facility-administered medications prior to visit.       Allergies as of 09/07/2022 - Reviewed 09/07/2022   Allergen Reaction Noted   • Lisinopril Cough 07/30/2019     Social History     Socioeconomic History   • Marital status:    Tobacco Use   • Smoking status: Never Smoker   • Smokeless tobacco: Never Used   • Tobacco comment: caffeine use   Substance and Sexual Activity   • Alcohol use: Yes     Alcohol/week: 1.0 standard drink     Types: 1 Cans of beer per week   • Drug use: No   • Sexual  "activity: Not Currently     Partners: Female     Birth control/protection: Surgical     Comment: Vasectomy, Prostatectomy     Family History   Problem Relation Age of Onset   • Heart disease Mother    • Hypertension Mother    • Colon cancer Mother          due to colon cancer   • Cancer Mother         colon   • Hearing loss Mother    • Heart disease Father    • Hypertension Father    • Other Father         CABG, heart valve replacement   • Coronary artery disease Father    • Heart failure Father    • Cancer Father         prostate   • Glaucoma Father    • Cancer Sister         Breast   • Hypertension Sister    • Colon polyps Sister         Recent polyp remival   • Cancer Sister         Breast   • Hypertension Sister    • Malig Hyperthermia Neg Hx      Review of Systems   Constitutional: Negative for chills, fever, weight gain and weight loss.   Cardiovascular: Negative for leg swelling.   Respiratory: Negative for cough, snoring and wheezing.    Hematologic/Lymphatic: Negative for bleeding problem. Bruises/bleeds easily.   Skin: Negative for color change.   Musculoskeletal: Negative for falls, joint pain and myalgias.   Gastrointestinal: Negative for melena.   Genitourinary: Negative for hematuria.   Neurological: Negative for excessive daytime sleepiness.   Psychiatric/Behavioral: Negative for depression. The patient is not nervous/anxious.         Objective:     Vitals:    22 1036   BP: 132/80   Pulse: 60   Weight: 74.4 kg (164 lb)   Height: 172.7 cm (68\")     Body mass index is 24.94 kg/m².    Vitals reviewed.   Constitutional:       Appearance: Well-developed.   Eyes:      General: No scleral icterus.        Right eye: No discharge.      Conjunctiva/sclera: Conjunctivae normal.      Pupils: Pupils are equal, round, and reactive to light.   HENT:      Head: Normocephalic.      Nose: Nose normal.   Neck:      Thyroid: No thyromegaly.      Vascular: No JVD.   Pulmonary:      Effort: Pulmonary effort is " normal. No respiratory distress.      Breath sounds: Normal breath sounds. No wheezing. No rales.   Cardiovascular:      Normal rate. Regular rhythm. Normal S1. Normal S2.      Murmurs: There is a harsh midsystolic murmur at the URSB, radiating to the neck.      No gallop.   Pulses:     Intact distal pulses.   Edema:     Peripheral edema absent.   Abdominal:      General: Bowel sounds are normal. There is no distension.      Palpations: Abdomen is soft.      Tenderness: There is no abdominal tenderness. There is no rebound.   Musculoskeletal: Normal range of motion.         General: No tenderness.      Cervical back: Normal range of motion and neck supple. Skin:     General: Skin is warm and dry.      Findings: No erythema or rash.   Neurological:      Mental Status: Alert and oriented to person, place, and time.   Psychiatric:         Behavior: Behavior normal.         Thought Content: Thought content normal.         Judgment: Judgment normal.       Lab Review:     ECG 12 Lead    Date/Time: 9/7/2022 10:45 AM  Performed by: Kisha Berman MD  Authorized by: Kisha Berman MD   Comparison: compared with previous ECG   Similar to previous ECG  Rhythm: sinus rhythm    Clinical impression: normal ECG          Assessment:       Diagnosis Plan   1. Coronary artery disease involving native coronary artery of native heart without angina pectoris  ECG 12 Lead   2. Hypertension, essential  ECG 12 Lead   3. Thoracic aortic aneurysm without rupture (HCC)  ECG 12 Lead   4. Nonrheumatic aortic valve stenosis  ECG 12 Lead   5. Mixed hyperlipidemia     6. Aortic root dilatation (HCC)       Plan:       1.  Coronary artery disease with history of prior stenting 2014 and negative stress echo 8/2020.  No anginal symptoms.  Continue statin medication.  Try to increase his exercise regimen by increasing duration to 2 miles in 30 minutes.  2.  Mildly dilated ascending aorta, stable on CT in September 2018 and by MRI in February 2010.  Stable  on echo 8/2021 measuring 4.1 cm similar to prior CTs.   findings MR angiogram also with similar findings.  Overall stable since 2015.Continue current regimen except for further blood pressure management as above.  Recommend a goal blood pressure 110-120 systolic aneurysmal disease.  He will also bring in his blood pressure device to upcoming appointment with GARY Frazier 3.  Mild to moderate aortic valve stenosis.  Stable by echo 8/2021.  4.  Hyperlipidemia.  Controlled though he has not had any recent blood work but will obtain this through Dr. Scherer's office  5.  Hypertension.  Borderline elevated today in the office.  He will increase t duration of exercise to 2 miles.    Time Spent: I spent 25 minutes caring for Chino on this date of service. This time includes time spent by me in the following activities: preparing for the visit, reviewing tests, obtaining and/or reviewing a separately obtained history, performing a medically appropriate examination and/or evaluation, counseling and educating the patient/family/caregiver, documenting information in the medical record and independently interpreting results and communicating that information with the patient/family/caregiver.   I spent 1 minutes on the separately reported service of ECG. This time is not included in the time used to support the E/M service also reported today.        Your medication list          Accurate as of September 7, 2022 11:59 PM. If you have any questions, ask your nurse or doctor.            CONTINUE taking these medications      Instructions Last Dose Given Next Dose Due   aspirin 81 MG chewable tablet      Chew 81 mg 2 (Two) Times a Day.       atorvastatin 80 MG tablet  Commonly known as: LIPITOR      TAKE 1 TABLET EVERY DAY       esomeprazole 40 MG capsule  Commonly known as: nexIUM      TAKE 1 CAPSULE BY MOUTH 2  TIMES A DAY.       losartan-hydrochlorothiazide 50-12.5 MG per tablet  Commonly known as: HYZAAR      Take 1 tablet by  mouth Daily.       metoprolol succinate XL 25 MG 24 hr tablet  Commonly known as: TOPROL-XL      Take 0.5 tablets by mouth Daily.          STOP taking these medications    predniSONE 10 MG (21) dose pack  Commonly known as: DELTASONE  Stopped by: Kisha Berman MD               Patient is no longer taking -.  I corrected the med list to reflect this.  I did not stop these medications.      Dictated utilizing Dragon dictation

## 2022-09-27 RX ORDER — ATORVASTATIN CALCIUM 80 MG/1
TABLET, FILM COATED ORAL
Qty: 90 TABLET | Refills: 3 | Status: SHIPPED | OUTPATIENT
Start: 2022-09-27

## 2022-09-27 NOTE — TELEPHONE ENCOUNTER
Rx Refill Note  Requested Prescriptions     Pending Prescriptions Disp Refills    atorvastatin (LIPITOR) 80 MG tablet [Pharmacy Med Name: ATORVASTATIN CALCIUM 80 MG Tablet] 90 tablet 3     Sig: TAKE 1 TABLET EVERY DAY      Last office visit with prescribing clinician: 1/18/2022      Next office visit with prescribing clinician: 10/24/2022            Noemy Marcano MA  09/27/22, 15:57 EDT

## 2022-10-10 ENCOUNTER — OFFICE VISIT (OUTPATIENT)
Dept: INTERNAL MEDICINE | Facility: CLINIC | Age: 67
End: 2022-10-10

## 2022-10-10 VITALS
DIASTOLIC BLOOD PRESSURE: 72 MMHG | TEMPERATURE: 97.8 F | HEART RATE: 82 BPM | OXYGEN SATURATION: 98 % | SYSTOLIC BLOOD PRESSURE: 136 MMHG | HEIGHT: 68 IN | BODY MASS INDEX: 25.49 KG/M2 | WEIGHT: 168.2 LBS

## 2022-10-10 DIAGNOSIS — J40 BRONCHITIS: ICD-10-CM

## 2022-10-10 DIAGNOSIS — R05.1 ACUTE COUGH: Primary | ICD-10-CM

## 2022-10-10 LAB
EXPIRATION DATE: NORMAL
EXPIRATION DATE: NORMAL
FLUAV AG NPH QL: NEGATIVE
FLUBV AG NPH QL: NEGATIVE
INTERNAL CONTROL: NORMAL
INTERNAL CONTROL: NORMAL
Lab: NORMAL
Lab: NORMAL
SARS-COV-2 AG UPPER RESP QL IA.RAPID: NOT DETECTED

## 2022-10-10 PROCEDURE — 87426 SARSCOV CORONAVIRUS AG IA: CPT | Performed by: NURSE PRACTITIONER

## 2022-10-10 PROCEDURE — 99213 OFFICE O/P EST LOW 20 MIN: CPT | Performed by: NURSE PRACTITIONER

## 2022-10-10 PROCEDURE — 87804 INFLUENZA ASSAY W/OPTIC: CPT | Performed by: NURSE PRACTITIONER

## 2022-10-10 RX ORDER — CEFDINIR 300 MG/1
300 CAPSULE ORAL 2 TIMES DAILY
Qty: 20 CAPSULE | Refills: 0 | Status: SHIPPED | OUTPATIENT
Start: 2022-10-10 | End: 2022-10-24

## 2022-10-10 NOTE — PROGRESS NOTES
Chief Complaint   Patient presents with   • Cough     Coughing for a week  Neg covid test Friday   • Sore Throat     Started about a week ago       Subjective     Chino Huber is a 66 y.o. male being seen for an acute visit for cough. He is reporting s/s that began 1 week ago. It began with sore throat, then progressed to chest congestion. He denies fever, sinus pressure. Mild SOA. He is taking dayquil and Nyquil with Mucinex.  He was exposed to URI symptom by grand kids. He has had 3 covid vaccines.       History of Present Illness     Allergies   Allergen Reactions   • Lisinopril Cough         Current Outpatient Medications:   •  aspirin 81 MG chewable tablet, Chew 81 mg 2 (Two) Times a Day., Disp: , Rfl:   •  atorvastatin (LIPITOR) 80 MG tablet, TAKE 1 TABLET EVERY DAY, Disp: 90 tablet, Rfl: 3  •  esomeprazole (nexIUM) 40 MG capsule, TAKE 1 CAPSULE BY MOUTH 2  TIMES A DAY., Disp: 180 capsule, Rfl: 3  •  losartan-hydrochlorothiazide (HYZAAR) 50-12.5 MG per tablet, Take 1 tablet by mouth Daily., Disp: 90 tablet, Rfl: 3  •  metoprolol succinate XL (TOPROL-XL) 25 MG 24 hr tablet, Take 0.5 tablets by mouth Daily., Disp: 45 tablet, Rfl: 3    The following portions of the patient's history were reviewed and updated as appropriate: allergies, current medications, past family history, past medical history, past social history, past surgical history and problem list.    Review of Systems   Constitutional: Positive for fatigue.   HENT: Positive for congestion.    Eyes: Negative.    Respiratory: Positive for cough and shortness of breath. Negative for wheezing.        Assessment     Physical Exam  Vitals reviewed.   Constitutional:       Appearance: Normal appearance.   Cardiovascular:      Rate and Rhythm: Normal rate and regular rhythm.      Pulses: Normal pulses.      Heart sounds: No murmur heard.  Pulmonary:      Effort: Pulmonary effort is normal. No respiratory distress.      Breath sounds: No stridor. Examination of  the right-upper field reveals decreased breath sounds. Decreased breath sounds and rhonchi present. No wheezing.   Musculoskeletal:      Right lower leg: No edema.      Left lower leg: No edema.   Skin:     General: Skin is warm and dry.   Neurological:      Mental Status: He is alert and oriented to person, place, and time.   Psychiatric:         Mood and Affect: Mood normal.         Behavior: Behavior normal.         Thought Content: Thought content normal.         Plan     Covid and Flu A and B testing are negative    Diagnoses and all orders for this visit:    1. Acute cough (Primary)  -     Cancel: POCT Influenza A/B  -     POCT VERITOR SARS-CoV-2 Antigen  -     POCT Influenza A/B    2. Bronchitis  -     cefdinir (OMNICEF) 300 MG capsule; Take 1 capsule by mouth 2 (Two) Times a Day.  Dispense: 20 capsule; Refill: 0      Follow up as needed

## 2022-10-15 ENCOUNTER — PATIENT MESSAGE (OUTPATIENT)
Dept: CARDIOLOGY | Facility: CLINIC | Age: 67
End: 2022-10-15

## 2022-10-17 NOTE — TELEPHONE ENCOUNTER
From: Chino Huber  To: Kisha Berman MD  Sent: 10/15/2022 7:56 PM EDT  Subject: CBC, PSA BLOOD TEST RESULTS      asked me to inform her when the blood test results from my annual physical exam came in. They are now available on [facility]

## 2022-10-20 ENCOUNTER — TELEPHONE (OUTPATIENT)
Dept: CARDIOLOGY | Facility: CLINIC | Age: 67
End: 2022-10-20

## 2022-10-20 NOTE — TELEPHONE ENCOUNTER
Regarding: CBC, PSA BLOOD TEST RESULTS   Contact: [phone]  ----- Message from Maddie Romeo MA sent at 10/17/2022  7:51 AM EDT -----       ----- Message from Chino Huber to Kisha Berman MD sent at 10/15/2022  7:56 PM -----    asked me to inform her when the blood test results from my annual physical exam came in. They are now available on [employer]

## 2022-10-24 ENCOUNTER — OFFICE VISIT (OUTPATIENT)
Dept: INTERNAL MEDICINE | Facility: CLINIC | Age: 67
End: 2022-10-24

## 2022-10-24 ENCOUNTER — TELEPHONE (OUTPATIENT)
Dept: INTERNAL MEDICINE | Facility: CLINIC | Age: 67
End: 2022-10-24

## 2022-10-24 VITALS
DIASTOLIC BLOOD PRESSURE: 78 MMHG | SYSTOLIC BLOOD PRESSURE: 136 MMHG | BODY MASS INDEX: 25.58 KG/M2 | HEIGHT: 68 IN | OXYGEN SATURATION: 97 % | TEMPERATURE: 97.5 F | HEART RATE: 73 BPM | WEIGHT: 168.8 LBS

## 2022-10-24 DIAGNOSIS — Z23 NEED FOR VACCINATION: ICD-10-CM

## 2022-10-24 DIAGNOSIS — Z00.00 ENCOUNTER FOR ANNUAL WELLNESS VISIT (AWV) IN MEDICARE PATIENT: Primary | ICD-10-CM

## 2022-10-24 DIAGNOSIS — K21.9 GERD WITHOUT ESOPHAGITIS: ICD-10-CM

## 2022-10-24 DIAGNOSIS — I25.10 CORONARY ARTERY DISEASE INVOLVING NATIVE CORONARY ARTERY OF NATIVE HEART WITHOUT ANGINA PECTORIS: ICD-10-CM

## 2022-10-24 DIAGNOSIS — E78.2 MIXED HYPERLIPIDEMIA: ICD-10-CM

## 2022-10-24 PROBLEM — Z85.46 HISTORY OF ADENOCARCINOMA OF PROSTATE: Status: ACTIVE | Noted: 2022-10-24

## 2022-10-24 PROCEDURE — 90662 IIV NO PRSV INCREASED AG IM: CPT | Performed by: NURSE PRACTITIONER

## 2022-10-24 PROCEDURE — 1170F FXNL STATUS ASSESSED: CPT | Performed by: NURSE PRACTITIONER

## 2022-10-24 PROCEDURE — G0439 PPPS, SUBSEQ VISIT: HCPCS | Performed by: NURSE PRACTITIONER

## 2022-10-24 PROCEDURE — G0008 ADMIN INFLUENZA VIRUS VAC: HCPCS | Performed by: NURSE PRACTITIONER

## 2022-10-24 PROCEDURE — 1160F RVW MEDS BY RX/DR IN RCRD: CPT | Performed by: NURSE PRACTITIONER

## 2022-10-24 NOTE — PROGRESS NOTES
QUICK REFERENCE INFORMATION:  The ABCs of Providing the Annual Wellness Visit   CMS.gov Learning Network    Medicare Annual Wellness Visit      Subjective   History of Present Illness    Chino Huber is a 66 y.o. male who presents for an Annual Wellness Visit. In addition, we addressed the following health issues:    HTN, CAD, GERD     He is on Losartan 50/12.5mg daily and Toprol XL 25mg  for HTN. He takes Lipitor 80mg for cholesterol control and Nexium 40mg daily for GERD. He checks his BP at home, running 120s-132/80s. He denies CP, SOA, edema.     He is followed by Dr. Berman for Aortic valve stenosis and CAD with stent placement in 2014. Stress ECHO 8-2020. Office Visit with Kisha Berman MD (09/07/2022). He had tried a higher dose of Toprol XL but it lowered his HR too much. Losartan 100/25mg caused him to feel lightheaded. He walks a mile per day, and works on planes and cars.     He is still having some residual cough from last visit. He is on Allergra and flonase at night for this.      He is followed by Dr. Martinez for hx of prostate cancer in 2009.     PMH, PSH, SocHx, FamHx, Allergies, and Medications: Reviewed and updated in the Visit Navigator.     Outpatient Medications Prior to Visit   Medication Sig Dispense Refill   • aspirin 81 MG chewable tablet Chew 81 mg 2 (Two) Times a Day.     • atorvastatin (LIPITOR) 80 MG tablet TAKE 1 TABLET EVERY DAY 90 tablet 3   • esomeprazole (nexIUM) 40 MG capsule TAKE 1 CAPSULE BY MOUTH 2  TIMES A DAY. 180 capsule 3   • losartan-hydrochlorothiazide (HYZAAR) 50-12.5 MG per tablet Take 1 tablet by mouth Daily. 90 tablet 3   • metoprolol succinate XL (TOPROL-XL) 25 MG 24 hr tablet Take 0.5 tablets by mouth Daily. 45 tablet 3   • cefdinir (OMNICEF) 300 MG capsule Take 1 capsule by mouth 2 (Two) Times a Day. 20 capsule 0     No facility-administered medications prior to visit.       Patient Active Problem List   Diagnosis   • Coronary artery disease involving native coronary  artery of native heart without angina pectoris   • GERD without esophagitis   • Hyperlipidemia   • Hypertension, essential   • Aortic root dilatation (HCC)   • Nonrheumatic aortic valve stenosis   • Bicuspid aortic valve   • Thoracic aortic aneurysm without rupture   • Screening for prostate cancer   • Need for immunization against influenza   • Environmental allergies   • Cough due to ACE inhibitor   • Globus sensation   • Dysphagia   • Adenomatous polyp of colon   • FH: colon cancer in first degree relative <60 years old       Health Habits:  Dental Exam. up to date  Eye Exam. up to date  Exercise: 7 times/week.  Current exercise activities include: walking    Social:  See review in SnapShot activity and in SocHx section of Visit Navigator.    Health Risk Assessment:  The patient has completed a Health Risk Assessment. This has been reviewed with them and has been scanned into Media Manager as a separate document.    Current Medical Providers:  Patient Care Team:  Laura Barron APRN as PCP - General (Family Medicine)  Kisha Berman MD as Consulting Physician (Cardiology)  Michael Martinez Jr., MD as Consulting Physician (Urology)  Fam Rose MD as Consulting Physician (Gastroenterology)    The [facility] providers who are involved in the care of this patient are listed above. Additional providers and suppliers are listed below:      Recent Hospitalizations:  No hospitalization(s) within the last year..    Age-appropriate Screening Schedule:  Refer to the list below for future screening recommendations based on patient's age. Orders for these recommended tests are listed in the plan section. The patient has been provided with a written plan.    Health Maintenance   Topic Date Due   • COVID-19 Vaccine (4 - Booster for Pfizer series) 11/29/2021   • GASTROSCOPY (EGD)  01/31/2022   • INFLUENZA VACCINE  08/01/2022   • ANNUAL WELLNESS VISIT  10/21/2022   • COLORECTAL CANCER SCREENING  01/31/2023    • LIPID PANEL  10/06/2023   • Pneumococcal Vaccine 65+ (3) 04/30/2024   • TDAP/TD VACCINES (3 - Td or Tdap) 08/01/2028   • HEPATITIS C SCREENING  Completed   • ZOSTER VACCINE  Completed       Depression Screen:   PHQ-2/PHQ-9 Depression Screening 10/24/2022   Retired PHQ-9 Total Score -   Retired Total Score -   Little Interest or Pleasure in Doing Things 0-->not at all   Feeling Down, Depressed or Hopeless 0-->not at all   PHQ-9: Brief Depression Severity Measure Score 0       Functional and Cognitive Screening:  Functional & Cognitive Status 10/24/2022   Do you have difficulty preparing food and eating? No   Do you have difficulty bathing yourself, getting dressed or grooming yourself? No   Do you have difficulty using the toilet? No   Do you have difficulty moving around from place to place? No   Do you have trouble with steps or getting out of a bed or a chair? No   Current Diet Well Balanced Diet   Dental Exam Up to date   Eye Exam Up to date   Exercise (times per week) 3 times per week   Current Exercises Include Walking   Do you need help using the phone?  No   Are you deaf or do you have serious difficulty hearing?  No   Do you need help with transportation? No   Do you need help shopping? No   Do you need help preparing meals?  No   Do you need help with housework?  No   Do you need help with laundry? No   Do you need help taking your medications? No   Do you need help managing money? No   Do you ever drive or ride in a car without wearing a seat belt? No       Does the patient have evidence of cognitive impairment? No    Advanced Care Planning:  ACP discussion was held with the patient during this visit. Patient has an advance directive (not in EMR), copy requested.    Identification of Risk Factors:  Risk factors include: Advance Directive Discussion  Cardiovascular risk  Immunizations Discussed/Encouraged (specific immunizations; Influenza and COVID19 ).    Review of Systems   Constitutional: Negative.   "  HENT: Negative.    Eyes: Negative.    Respiratory: Negative.    Cardiovascular: Negative.  Negative for chest pain, palpitations and leg swelling.   Gastrointestinal: Negative.    Endocrine: Negative.    Genitourinary: Negative.    Musculoskeletal: Negative.  Negative for arthralgias and back pain.   Skin: Negative.    Allergic/Immunologic: Negative.    Neurological: Negative.  Negative for tremors, syncope and weakness.   Hematological: Negative.  Negative for adenopathy. Does not bruise/bleed easily.   Psychiatric/Behavioral: Negative.  Negative for agitation and behavioral problems.   All other systems reviewed and are negative.          Objective     Vitals:    10/24/22 0805   Weight: 76.6 kg (168 lb 12.8 oz)   Height: 172.7 cm (68\")       Body mass index is 25.67 kg/m².    /78 (BP Location: Right arm, Patient Position: Sitting, Cuff Size: Adult)   Pulse 73   Temp 97.5 °F (36.4 °C)   Ht 172.7 cm (68\")   Wt 76.6 kg (168 lb 12.8 oz)   SpO2 97%   BMI 25.67 kg/m²   General appearance: alert, appears stated age and cooperative  Head: Normocephalic, without obvious abnormality, atraumatic  Nose: Nares normal. Septum midline. Mucosa normal. No drainage or sinus tenderness.  Throat: lips, mucosa, and tongue normal; teeth and gums normal  Lungs: clear to auscultation bilaterally  Chest wall: no tenderness  Heart: regular rate and rhythm, S1, S2 normal, no murmur, click, rub or gallop  Abdomen: soft, non-tender; bowel sounds normal; no masses,  no organomegaly  Extremities: extremities normal, atraumatic, no cyanosis or edema  Pulses: 2+ and symmetric  Skin: Skin color, texture, turgor normal. No rashes or lesions  Lymph nodes: Cervical, supraclavicular, and axillary nodes normal.  Neurologic: Alert and oriented X 3, normal strength and tone. Normal symmetric reflexes. Normal coordination and gait      Assessment & Plan   Patient Self-Management and Personalized Health Advice  The patient has been provided " with information about: exercise, prevention of cardiac or vascular disease and the relationship between weight and GERD and preventive services including:   · Annual Wellness Visit (AWV)  · Cardiovascular Disease Screening Tests (may do this order every 5 years in beneficiaries without signs or symptoms of cardiovascular disease)  · Prostate Cancer Screening .    Discussed the patient's BMI with him. The BMI is above average; BMI management plan is completed.    Orders:  Orders Placed This Encounter   Procedures   • Fluzone High-Dose 65+yrs ([id number])      Diagnosis Plan   1. Encounter for annual wellness visit (AWV) in Medicare patient        2. Coronary artery disease involving native coronary artery of native heart without angina pectoris  Comprehensive Metabolic Panel    Lipid Panel With / Chol / HDL Ratio    CBC & Differential      3. GERD without esophagitis  Ambulatory Referral to Gastroenterology    Comprehensive Metabolic Panel      4. Mixed hyperlipidemia  Comprehensive Metabolic Panel    Lipid Panel With / Chol / HDL Ratio    CBC & Differential      5. Need for vaccination  Fluzone High-Dose 65+yrs ([id number])        Follow Up:    1 year w labs    An After Visit Summary and PPPS with all of these plans were given to the patient.

## 2022-10-24 NOTE — TELEPHONE ENCOUNTER
Caller: Chino Huber    Relationship: Self    Best call back number: [phone]      What is the best time to reach you: ANYTIME    Who are you requesting to speak with (clinical staff, provider,  specific staff member): MA      What was the call regarding: PATIENT IS HAVING AN ISSUE WITH SOME KIND OF CODING ERROR ON HIS LAST LABS THAT HE HAD DONE, HE HAS SPOKEN WITH MEDICARE, [employer] AND [facility] AND THEY REFERRED HIM BACK TO PCP OFFICE TO LOOK INTO THE CODING. STATES THAT THEY ADVISED THEY DO NOT EVEN HAVE AN INVOICE FOR IT.    Do you require a callback: YES

## 2022-10-25 NOTE — TELEPHONE ENCOUNTER
Called [facility] and they had informed me that the issue was with them trying to bill [employer] first instead of medicare. They said they would rerun it and get the updated prices on everything. Tried to call patient to inform him of this but was unable to reach and left a voicemail with the details.

## 2022-12-29 RX ORDER — METOPROLOL SUCCINATE 25 MG/1
TABLET, EXTENDED RELEASE ORAL
Qty: 45 TABLET | Refills: 2 | Status: SHIPPED | OUTPATIENT
Start: 2022-12-29 | End: 2023-03-01 | Stop reason: SDUPTHER

## 2022-12-29 RX ORDER — LOSARTAN POTASSIUM AND HYDROCHLOROTHIAZIDE 12.5; 5 MG/1; MG/1
TABLET ORAL
Qty: 90 TABLET | Refills: 2 | Status: SHIPPED | OUTPATIENT
Start: 2022-12-29

## 2022-12-29 RX ORDER — ESOMEPRAZOLE MAGNESIUM 40 MG/1
CAPSULE, DELAYED RELEASE ORAL
Qty: 180 CAPSULE | Refills: 3 | Status: SHIPPED | OUTPATIENT
Start: 2022-12-29

## 2022-12-29 NOTE — TELEPHONE ENCOUNTER
Rx Refill Note  Requested Prescriptions     Pending Prescriptions Disp Refills    esomeprazole (nexIUM) 40 MG capsule [Pharmacy Med Name: ESOMEPRAZOLE MAGNESIUM 40 MG Capsule Delayed Release] 180 capsule 3     Sig: TAKE 1 CAPSULE BY MOUTH 2  TIMES A DAY.      Last office visit with prescribing clinician: 10/24/2022   Last telemedicine visit with prescribing clinician: Visit date not found   Next office visit with prescribing clinician: 11/6/2023                         Would you like a call back once the refill request has been completed: [] Yes [] No    If the office needs to give you a call back, can they leave a voicemail: [] Yes [] No    Noemy Marcano MA  12/29/22, 07:48 EST

## 2023-01-03 ENCOUNTER — OFFICE VISIT (OUTPATIENT)
Dept: GASTROENTEROLOGY | Facility: CLINIC | Age: 68
End: 2023-01-03
Payer: MEDICARE

## 2023-01-03 ENCOUNTER — TELEPHONE (OUTPATIENT)
Dept: GASTROENTEROLOGY | Facility: CLINIC | Age: 68
End: 2023-01-03
Payer: MEDICARE

## 2023-01-03 VITALS
OXYGEN SATURATION: 95 % | TEMPERATURE: 97.1 F | SYSTOLIC BLOOD PRESSURE: 151 MMHG | DIASTOLIC BLOOD PRESSURE: 91 MMHG | HEIGHT: 68 IN | HEART RATE: 79 BPM | BODY MASS INDEX: 26.22 KG/M2 | WEIGHT: 173 LBS

## 2023-01-03 DIAGNOSIS — K21.9 GASTROESOPHAGEAL REFLUX DISEASE, UNSPECIFIED WHETHER ESOPHAGITIS PRESENT: Primary | ICD-10-CM

## 2023-01-03 DIAGNOSIS — D12.6 ADENOMATOUS POLYP OF COLON, UNSPECIFIED PART OF COLON: ICD-10-CM

## 2023-01-03 DIAGNOSIS — K22.70 BARRETT'S ESOPHAGUS WITHOUT DYSPLASIA: ICD-10-CM

## 2023-01-03 PROCEDURE — 99214 OFFICE O/P EST MOD 30 MIN: CPT | Performed by: INTERNAL MEDICINE

## 2023-01-03 PROCEDURE — 1160F RVW MEDS BY RX/DR IN RCRD: CPT | Performed by: INTERNAL MEDICINE

## 2023-01-03 PROCEDURE — 1159F MED LIST DOCD IN RCRD: CPT | Performed by: INTERNAL MEDICINE

## 2023-01-03 NOTE — PROGRESS NOTES
Chief Complaint   Patient presents with   • Heartburn   • Discuss Needed c scope   • Soft BM's        Chino Huber is a  67 y.o. male here for a follow up visit for history of reflux, Chawla's esophagus, history of polyps    HPI this 67-year-old white male patient of GARY Graves presents since last undergoing upper and lower endoscopies in January 2020.  He was found to have Chawla's esophagus as well as adenomatous polyps in his colon.  He would be due for follow-up EGD and colonoscopy on that basis.  He states his reflux is well controlled with Nexium.    Past Medical History:   Diagnosis Date   • Acute sinusitis    • Allergic 1969   • Aortic root dilatation (HCC)    • Aortic stenosis    • Aortic valve stenosis     mild   • Arteriosclerotic coronary artery disease    • Bicuspid aortic valve    • CAD (coronary artery disease)    • Cancer (HCC) 2009    Prostate   • Chest pain    • Colon polyp    • Cough    • Dilated aortic root (HCC)    • ED (erectile dysfunction)    • Erectile dysfunction 2009    Prostatectomy   • Folliculitis    • GERD (gastroesophageal reflux disease)    • Heart murmur 2013    Aortic bicuspid   • Heartburn    • Hyperlipidemia    • Hypertension    • Nonspecific abnormal findings on radiological and examination of intrathoracic organs    • Precordial pain    • Reflux esophagitis    • Sore throat    • Thoracic aortic aneurysm without rupture    • Throat pain        Current Outpatient Medications   Medication Sig Dispense Refill   • aspirin 81 MG chewable tablet Chew 81 mg 2 (Two) Times a Day.     • atorvastatin (LIPITOR) 80 MG tablet TAKE 1 TABLET EVERY DAY 90 tablet 3   • esomeprazole (nexIUM) 40 MG capsule TAKE 1 CAPSULE BY MOUTH 2  TIMES A DAY. 180 capsule 3   • losartan-hydrochlorothiazide (HYZAAR) 50-12.5 MG per tablet TAKE 1 TABLET EVERY DAY 90 tablet 2   • metoprolol succinate XL (TOPROL-XL) 25 MG 24 hr tablet TAKE 1/2 TABLET EVERY DAY 45 tablet 2     No current facility-administered  medications for this visit.       PRN Meds:.    Allergies   Allergen Reactions   • Lisinopril Cough       Social History     Socioeconomic History   • Marital status:    Tobacco Use   • Smoking status: Never   • Smokeless tobacco: Never   • Tobacco comments:     caffeine use   Substance and Sexual Activity   • Alcohol use: Yes     Alcohol/week: 1.0 standard drink     Types: 1 Cans of beer per week   • Drug use: No   • Sexual activity: Not Currently     Partners: Female     Birth control/protection: Surgical     Comment: Vasectomy, Prostatectomy       Family History   Problem Relation Age of Onset   • Heart disease Mother    • Hypertension Mother    • Colon cancer Mother          due to colon cancer   • Cancer Mother         colon   • Hearing loss Mother    • Heart disease Father    • Hypertension Father    • Other Father         CABG, heart valve replacement   • Coronary artery disease Father    • Heart failure Father    • Cancer Father         prostate   • Glaucoma Father    • Cancer Sister         Breast   • Hypertension Sister    • Colon polyps Sister         Recent polyp remival   • Cancer Sister         Breast   • Hypertension Sister    • Malig Hyperthermia Neg Hx        Review of Systems   Constitutional: Negative for activity change, appetite change, fatigue and unexpected weight change.   HENT: Negative for congestion, facial swelling, sore throat, trouble swallowing and voice change.    Eyes: Negative for photophobia and visual disturbance.   Respiratory: Negative for cough and choking.    Cardiovascular: Negative for chest pain.   Gastrointestinal: Negative for abdominal distention, abdominal pain, anal bleeding, blood in stool, constipation, diarrhea, nausea, rectal pain and vomiting.        GERD   Endocrine: Negative for polyphagia.   Musculoskeletal: Negative for arthralgias, gait problem and joint swelling.   Skin: Negative for color change, pallor and rash.   Allergic/Immunologic: Negative  for food allergies.   Neurological: Negative for speech difficulty and headaches.   Hematological: Does not bruise/bleed easily.   Psychiatric/Behavioral: Negative for agitation, confusion and sleep disturbance.       Vitals:    01/03/23 0918   BP: 151/91   Pulse: 79   Temp: 97.1 °F (36.2 °C)   SpO2: 95%       Physical Exam  Constitutional:       Appearance: He is well-developed.   HENT:      Head: Normocephalic.   Eyes:      Conjunctiva/sclera: Conjunctivae normal.   Cardiovascular:      Rate and Rhythm: Normal rate and regular rhythm.   Pulmonary:      Breath sounds: Normal breath sounds.   Abdominal:      General: Bowel sounds are normal.      Palpations: Abdomen is soft.   Musculoskeletal:         General: Normal range of motion.      Cervical back: Normal range of motion.   Skin:     General: Skin is warm and dry.   Neurological:      Mental Status: He is alert and oriented to person, place, and time.   Psychiatric:         Behavior: Behavior normal.         ASSESSMENT   #1 GERD  #2 Chawla's esophagus  #3 history of polyps      PLAN  Schedule EGD and colonoscopy  Continue PPI      ICD-10-CM ICD-9-CM   1. Gastroesophageal reflux disease, unspecified whether esophagitis present  K21.9 530.81   2. Adenomatous polyp of colon, unspecified part of colon  D12.6 211.3

## 2023-01-03 NOTE — TELEPHONE ENCOUNTER
OK FOR HUB TO READ NOY patient via telephone for EGD/CLONOSCOPY. Scheduled [phone] with arrival time of 0730AM. Prep paperwork mailed to verified address on file. Patient advised arrival time may change based on [facility] guidelines. NOY DAVIS

## 2023-01-23 NOTE — TELEPHONE ENCOUNTER
Last appt with Dr Berman: 9/8/2021  Next appt with Akilah: 3/7/2022     Last labs: 10/14/2021 ([facility] [address])  
No

## 2023-03-01 ENCOUNTER — OFFICE VISIT (OUTPATIENT)
Dept: CARDIOLOGY | Facility: CLINIC | Age: 68
End: 2023-03-01
Payer: MEDICARE

## 2023-03-01 VITALS
DIASTOLIC BLOOD PRESSURE: 84 MMHG | HEART RATE: 56 BPM | WEIGHT: 171 LBS | SYSTOLIC BLOOD PRESSURE: 144 MMHG | HEIGHT: 68 IN | BODY MASS INDEX: 25.91 KG/M2

## 2023-03-01 DIAGNOSIS — I77.810 AORTIC ROOT DILATATION: ICD-10-CM

## 2023-03-01 DIAGNOSIS — E78.2 MIXED HYPERLIPIDEMIA: ICD-10-CM

## 2023-03-01 DIAGNOSIS — I35.0 NONRHEUMATIC AORTIC VALVE STENOSIS: ICD-10-CM

## 2023-03-01 DIAGNOSIS — I71.21 ANEURYSM OF ASCENDING AORTA WITHOUT RUPTURE: ICD-10-CM

## 2023-03-01 DIAGNOSIS — I10 HYPERTENSION, ESSENTIAL: ICD-10-CM

## 2023-03-01 DIAGNOSIS — I25.10 CORONARY ARTERY DISEASE INVOLVING NATIVE CORONARY ARTERY OF NATIVE HEART WITHOUT ANGINA PECTORIS: Primary | ICD-10-CM

## 2023-03-01 PROCEDURE — 93000 ELECTROCARDIOGRAM COMPLETE: CPT | Performed by: INTERNAL MEDICINE

## 2023-03-01 PROCEDURE — 99214 OFFICE O/P EST MOD 30 MIN: CPT | Performed by: INTERNAL MEDICINE

## 2023-03-01 RX ORDER — METOPROLOL SUCCINATE 25 MG/1
25 TABLET, EXTENDED RELEASE ORAL DAILY
Qty: 90 TABLET | Refills: 3 | Status: SHIPPED | OUTPATIENT
Start: 2023-03-01

## 2023-03-01 NOTE — PROGRESS NOTES
Date of Office Visit: 2023  Encounter Provider: Kisha Berman MD  Place of Service: [facility] CARDIOLOGY  Patient Name: Chino Huber  :1955    Chief complaint  Coronary artery disease, aortic aneurysm, aortic valve stenosis    History of Present Illness  The patient is a pleasant, 67 year-old gentleman with history of prostate cancer status  post resection, hypertension, and hyperlipidemia who in 2014 was noted to have coronary  artery disease when he presented with chest pain and abnormal stress test. Cardiac catheterization revealed 90% stenosis of the circumflex artery and of the right coronary  artery. The left anterior descending and left main were normal. He underwent drug-coated  stent placement to both the circumflex and the right coronary artery.  He had a CT angio of his chest in May 2015 that revealed stable thoracic aneurysm measuring 4.0 cm.  In 2018 he had an echocardiogram that showed normal systolic function with mild left atrial enlargement, bicuspid aortic valve with mild stenosis with a mean gradient of 10 mmHg and mildly dilated asending aorta measuring 3.6 cm.  CT angiogram of the chest showed stable dilated a sending aorta measuring 4.1 cm at the ascending aortic level.  In 2020 MR angiography of the chest showed asending aortic aneurysm measuring 3.7 cm overall unchanged from CT study where it measured 4.1 cm.  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  He had a stress echocardiogram 2020 that showed no ischemia at a good workload.  LV systolic function was normal with grade 1 diastolic functions that was mild to moderate aortic valve stenosis with a valve area of 1.47 square to mean gradient of 23 mmHg.  RV systolic pressure was normal.  He had a limited echocardiogram a month ago that showed normal systolic and diastolic function with valve area of 1.5 cm.  Mean gradient of 15 mmHg and a  sending aorta measuring 4.1 cm.  Images were adequate.  Follow-up echocardiogram was performed on 8/9/2021 that showed normal systolic function with mild to moderate valve stenosis with mean gradient of 50 mmHg valve area 1.5 cm² ascending aorta measuring 4.1 cm.  Aortic root measuring 4 cm.  He had an MR angiogram of the chest 4/2022 that showed aortic root measuring 3.8 cm in the coronal view of the ascending aorta measuring 4.2 cm.  This is relatively unchanged by comparison to CT scan from 2015.    Since last visit he is not exercising he denies any chest pain, shortness of breath, palpitations, syncope near syncope.  Blood pressure at home has been typically lower.  Heart rate has been in the 50s to 60s range.    Blood work on 10/2022 includes normal CMP, LDL 62, HDL 41, triglycerides 122, CBC unremarkable.    Past Medical History:   Diagnosis Date   • Acute sinusitis    • Allergic 1969   • Aortic root dilatation (HCC)    • Aortic stenosis    • Aortic valve stenosis     mild   • Arteriosclerotic coronary artery disease    • Bicuspid aortic valve    • CAD (coronary artery disease)    • Cancer (HCC) 2009    Prostate   • Chest pain    • Colon polyp    • Cough    • Dilated aortic root (HCC)    • ED (erectile dysfunction)    • Erectile dysfunction 2009    Prostatectomy   • Folliculitis    • GERD (gastroesophageal reflux disease)    • Heart murmur 2013    Aortic bicuspid   • Heartburn    • Hyperlipidemia    • Hypertension    • Nonspecific abnormal findings on radiological and examination of intrathoracic organs    • Precordial pain    • Reflux esophagitis    • Sore throat    • Thoracic aortic aneurysm without rupture    • Throat pain      Past Surgical History:   Procedure Laterality Date   • ABDOMINAL SURGERY  Prostate   • CARDIAC CATHETERIZATION  6/2013    Stent placement   • CLOSED REDUCTION FOREARM FRACTURE Right    • COLONOSCOPY     • COLONOSCOPY     • COLONOSCOPY N/A 2/3/2017    Procedure: COLONOSCOPY TO CECUM  WITH HOT SNARE POLYPECTOMY WITH NORMAL SALINE INJECTION AND  TWO RESOLUTION CLIPS;  Surgeon: Fam LILLY MD;  Location: [facility] ENDOSCOPY;  Service:    • COLONOSCOPY N/A 1/31/2020    Procedure: COLONOSCOPY TO TI  WITH POLYPECTOMY (COLD SNARE);  Surgeon: Fam Rose MD;  Location: [facility] ENDOSCOPY;  Service: Gastroenterology   • CORONARY ANGIOPLASTY WITH STENT PLACEMENT     • CORONARY STENT PLACEMENT  6/2013   • ENDOSCOPY N/A 3/15/2017    Procedure: ESOPHAGOGASTRODUODENOSCOPY WITH BIOPSIES (COLD);  Surgeon: Fam LILLY MD;  Location: [facility] ENDOSCOPY;  Service:    • ENDOSCOPY N/A 1/31/2020    Procedure: ESOPHAGOGASTRODUODENOSCOPY WITH BIOPSIES;  Surgeon: Fam Rose MD;  Location: [facility] ENDOSCOPY;  Service: Gastroenterology   • LASIK Bilateral    • PROSTATE SURGERY     • PROSTATE SURGERY     • RHINOPLASTY     • RHINOPLASTY     • SINUS SURGERY  1987/1988   • TONSILLECTOMY  1962   • UPPER GASTROINTESTINAL ENDOSCOPY  3/2017     Outpatient Medications Prior to Visit   Medication Sig Dispense Refill   • aspirin 81 MG chewable tablet Chew 1 tablet 2 (Two) Times a Day.     • atorvastatin (LIPITOR) 80 MG tablet TAKE 1 TABLET EVERY DAY 90 tablet 3   • esomeprazole (nexIUM) 40 MG capsule TAKE 1 CAPSULE BY MOUTH 2  TIMES A DAY. 180 capsule 3   • losartan-hydrochlorothiazide (HYZAAR) 50-12.5 MG per tablet TAKE 1 TABLET EVERY DAY 90 tablet 2   • metoprolol succinate XL (TOPROL-XL) 25 MG 24 hr tablet TAKE 1/2 TABLET EVERY DAY 45 tablet 2     No facility-administered medications prior to visit.       Allergies as of 03/01/2023 - Reviewed 03/01/2023   Allergen Reaction Noted   • Lisinopril Cough 07/30/2019     Social History     Socioeconomic History   • Marital status:    Tobacco Use   • Smoking status: Never   • Smokeless tobacco: Never   • Tobacco comments:     caffeine use   Substance and Sexual Activity   • Alcohol use: Yes     Alcohol/week: 1.0 standard drink     Types: 1 Cans of  "beer per week   • Drug use: No   • Sexual activity: Not Currently     Partners: Female     Birth control/protection: Surgical     Comment: Vasectomy, Prostatectomy     Family History   Problem Relation Age of Onset   • Heart disease Mother    • Hypertension Mother    • Colon cancer Mother          due to colon cancer   • Cancer Mother         colon   • Hearing loss Mother    • Heart disease Father    • Hypertension Father    • Other Father         CABG, heart valve replacement   • Coronary artery disease Father    • Heart failure Father    • Cancer Father         prostate   • Glaucoma Father    • Cancer Sister         Breast   • Hypertension Sister    • Colon polyps Sister         Recent polyp remival   • Cancer Sister         Breast   • Hypertension Sister    • Malig Hyperthermia Neg Hx      Review of Systems   Constitutional: Negative for chills, fever, weight gain and weight loss.   HENT: Positive for congestion.    Cardiovascular: Negative for leg swelling.   Respiratory: Positive for snoring. Negative for cough and wheezing.    Hematologic/Lymphatic: Negative for bleeding problem. Bruises/bleeds easily.   Skin: Negative for color change.   Musculoskeletal: Negative for falls, joint pain and myalgias.   Gastrointestinal: Negative for melena.   Genitourinary: Negative for hematuria.   Neurological: Negative for excessive daytime sleepiness.   Psychiatric/Behavioral: Negative for depression. The patient is not nervous/anxious.         Objective:     Vitals:    23 1034   BP: 144/84   Pulse: 56   Weight: 77.6 kg (171 lb)   Height: 172.7 cm (68\")     Body mass index is 26 kg/m².    Vitals reviewed.   Constitutional:       Appearance: Well-developed.   Eyes:      General: No scleral icterus.        Right eye: No discharge.      Conjunctiva/sclera: Conjunctivae normal.      Pupils: Pupils are equal, round, and reactive to light.   HENT:      Head: Normocephalic.      Nose: Nose normal.   Neck:      Thyroid: " No thyromegaly.      Vascular: No JVD.   Pulmonary:      Effort: Pulmonary effort is normal. No respiratory distress.      Breath sounds: Normal breath sounds. No wheezing. No rales.   Cardiovascular:      Normal rate. Regular rhythm. Normal S1. Normal S2.      Murmurs: There is no murmur.      No gallop.   Pulses:     Intact distal pulses.   Edema:     Peripheral edema absent.   Abdominal:      General: Bowel sounds are normal. There is no distension.      Palpations: Abdomen is soft.      Tenderness: There is no abdominal tenderness. There is no rebound.   Musculoskeletal: Normal range of motion.         General: No tenderness.      Cervical back: Normal range of motion and neck supple. Skin:     General: Skin is warm and dry.      Findings: No erythema or rash.   Neurological:      Mental Status: Alert and oriented to person, place, and time.   Psychiatric:         Behavior: Behavior normal.         Thought Content: Thought content normal.         Judgment: Judgment normal.       Lab Review:     ECG 12 Lead    Date/Time: 3/1/2023 10:46 AM  Performed by: Kisha Berman MD  Authorized by: Kisha Berman MD   Comparison: compared with previous ECG   Similar to previous ECG  Rhythm: sinus rhythm    Clinical impression: normal ECG          Assessment:       Diagnosis Plan   1. Coronary artery disease involving native coronary artery of native heart without angina pectoris  ECG 12 Lead      2. Hypertension, essential  ECG 12 Lead      3. Nonrheumatic aortic valve stenosis  Adult Transthoracic Echo Complete W/ Cont if Necessary Per Protocol      4. Aneurysm of ascending aorta without rupture  MRI Angiogram Chest With & Without Contrast      5. Mixed hyperlipidemia        6. Aortic root dilatation (HCC)          Plan:       1.  Coronary artery disease with history of prior stenting 2014 and negative stress echo 8/2020.  No anginal symptoms.  Continue risk factor modification.  2.  Mildly dilated ascending aorta, stable on CT  in September 2018 and by MRI in February 2010.  Stable on echo 8/2021 measuring 4.1 cm similar to prior CTs.   findings MR angiogram 4/2022 (4.2 cm of the ascending aorta) also with similar findings.  Overall stable since 2015.  We will check a MR angiogram of the chest.  3.  Mild to moderate aortic valve stenosis.  Stable by echo 8/2021.  Recheck echo  4.  Hyperlipidemia.  Goal LDL less than 70, HDL greater than 40, triglycerides less than 150.  5.  Hypertension.  Elevated today and states he has been using more sinus medications which he thinks is contributing.  He is to see ENT in the near future.  In the meanwhile we will increase Toprol-XL to 25 mg a day.        Time Spent: I spent 35 minutes caring for Chino on this date of service. This time includes time spent by me in the following activities: preparing for the visit, reviewing tests, obtaining and/or reviewing a separately obtained history, performing a medically appropriate examination and/or evaluation, counseling and educating the patient/family/caregiver, ordering medications, tests, or procedures, documenting information in the medical record and independently interpreting results and communicating that information with the patient/family/caregiver.   I spent 1 minutes on the separately reported service of ECG. This time is not included in the time used to support the E/M service also reported today.        Your medication list          Accurate as of March 1, 2023 11:59 PM. If you have any questions, ask your nurse or doctor.            CHANGE how you take these medications      Instructions Last Dose Given Next Dose Due   metoprolol succinate XL 25 MG 24 hr tablet  Commonly known as: TOPROL-XL  What changed: how much to take  Changed by: Kisha Berman MD      Take 1 tablet by mouth Daily.          CONTINUE taking these medications      Instructions Last Dose Given Next Dose Due   aspirin 81 MG chewable tablet      Chew 1 tablet 2 (Two) Times a Day.        atorvastatin 80 MG tablet  Commonly known as: LIPITOR      TAKE 1 TABLET EVERY DAY       esomeprazole 40 MG capsule  Commonly known as: nexIUM      TAKE 1 CAPSULE BY MOUTH 2  TIMES A DAY.       losartan-hydrochlorothiazide 50-12.5 MG per tablet  Commonly known as: HYZAAR      TAKE 1 TABLET EVERY DAY             Where to Get Your Medications      These medications were sent to [facility] - [place], [place] - [address]  - [phone] [facility] [phone] FX  [address] , [facility] [address]    Phone: [phone]   · metoprolol succinate XL 25 MG 24 hr tablet         Patient is no longer taking -.  I corrected the med list to reflect this.  I did not stop these medications.      Dictated utilizing Dragon dictation

## 2023-03-15 ENCOUNTER — OFFICE VISIT (OUTPATIENT)
Dept: INTERNAL MEDICINE | Facility: CLINIC | Age: 68
End: 2023-03-15
Payer: MEDICARE

## 2023-03-15 VITALS
TEMPERATURE: 98.7 F | HEIGHT: 68 IN | HEART RATE: 78 BPM | DIASTOLIC BLOOD PRESSURE: 70 MMHG | BODY MASS INDEX: 25.52 KG/M2 | OXYGEN SATURATION: 98 % | SYSTOLIC BLOOD PRESSURE: 136 MMHG | WEIGHT: 168.4 LBS

## 2023-03-15 DIAGNOSIS — J01.01 ACUTE RECURRENT MAXILLARY SINUSITIS: ICD-10-CM

## 2023-03-15 DIAGNOSIS — T70.20XD: Primary | ICD-10-CM

## 2023-03-15 DIAGNOSIS — Z91.09 ENVIRONMENTAL ALLERGIES: ICD-10-CM

## 2023-03-15 PROCEDURE — 3078F DIAST BP <80 MM HG: CPT | Performed by: NURSE PRACTITIONER

## 2023-03-15 PROCEDURE — 99495 TRANSJ CARE MGMT MOD F2F 14D: CPT | Performed by: NURSE PRACTITIONER

## 2023-03-15 PROCEDURE — 1111F DSCHRG MED/CURRENT MED MERGE: CPT | Performed by: NURSE PRACTITIONER

## 2023-03-15 PROCEDURE — 3075F SYST BP GE 130 - 139MM HG: CPT | Performed by: NURSE PRACTITIONER

## 2023-03-15 RX ORDER — CEFDINIR 300 MG/1
300 CAPSULE ORAL 2 TIMES DAILY
Qty: 20 CAPSULE | Refills: 0 | Status: SHIPPED | OUTPATIENT
Start: 2023-03-15

## 2023-03-15 RX ORDER — FLUTICASONE PROPIONATE 50 MCG
2 SPRAY, SUSPENSION (ML) NASAL DAILY
Start: 2023-03-15

## 2023-03-15 RX ORDER — FEXOFENADINE HCL 180 MG/1
180 TABLET ORAL DAILY
Start: 2023-03-15 | End: 2023-03-24
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
## 2023-03-16 ENCOUNTER — TELEPHONE (OUTPATIENT)
Dept: CARDIOLOGY | Facility: CLINIC | Age: 68
End: 2023-03-16
Payer: MEDICARE

## 2023-03-16 NOTE — TELEPHONE ENCOUNTER
----- Message from Chino Huber sent at 3/15/2023  8:39 PM EDT -----  Regarding: Scheduled MRI and ultrasound   Contact: [phone]  I recently visited [place] and encountered an episode of altitude sickness resulting in visiting the ER at [facility].  They performed an EKG, complete echo, cardiac Doppler and flow. All normal. Additionally a cardiac pet mouse stress test. Again normal.     Please cancel any request for the scheduling as the have already been performed.     The ultimate diagnosis was Altitude Sickness

## 2023-03-24 ENCOUNTER — ANESTHESIA EVENT (OUTPATIENT)
Dept: GASTROENTEROLOGY | Facility: HOSPITAL | Age: 68
End: 2023-03-24
Payer: MEDICARE

## 2023-03-24 ENCOUNTER — HOSPITAL ENCOUNTER (OUTPATIENT)
Facility: HOSPITAL | Age: 68
Setting detail: HOSPITAL OUTPATIENT SURGERY
Discharge: HOME | End: 2023-03-24
Attending: INTERNAL MEDICINE | Admitting: INTERNAL MEDICINE
Payer: MEDICARE

## 2023-03-24 ENCOUNTER — ANESTHESIA (OUTPATIENT)
Dept: GASTROENTEROLOGY | Facility: HOSPITAL | Age: 68
End: 2023-03-24
Payer: MEDICARE

## 2023-03-24 VITALS
HEART RATE: 70 BPM | OXYGEN SATURATION: 96 % | DIASTOLIC BLOOD PRESSURE: 79 MMHG | HEIGHT: 67 IN | BODY MASS INDEX: 26.68 KG/M2 | RESPIRATION RATE: 16 BRPM | WEIGHT: 170 LBS | SYSTOLIC BLOOD PRESSURE: 126 MMHG

## 2023-03-24 DIAGNOSIS — K22.70 BARRETT'S ESOPHAGUS WITHOUT DYSPLASIA: ICD-10-CM

## 2023-03-24 DIAGNOSIS — D12.6 ADENOMATOUS POLYP OF COLON, UNSPECIFIED PART OF COLON: ICD-10-CM

## 2023-03-24 DIAGNOSIS — K21.9 GASTROESOPHAGEAL REFLUX DISEASE, UNSPECIFIED WHETHER ESOPHAGITIS PRESENT: ICD-10-CM

## 2023-03-24 PROCEDURE — 45385 COLONOSCOPY W/LESION REMOVAL: CPT | Performed by: INTERNAL MEDICINE

## 2023-03-24 PROCEDURE — 87081 CULTURE SCREEN ONLY: CPT | Performed by: INTERNAL MEDICINE

## 2023-03-24 PROCEDURE — 43239 EGD BIOPSY SINGLE/MULTIPLE: CPT | Performed by: INTERNAL MEDICINE

## 2023-03-24 PROCEDURE — 45380 COLONOSCOPY AND BIOPSY: CPT | Performed by: INTERNAL MEDICINE

## 2023-03-24 PROCEDURE — 88305 TISSUE EXAM BY PATHOLOGIST: CPT | Performed by: INTERNAL MEDICINE

## 2023-03-24 PROCEDURE — S0260 H&P FOR SURGERY: HCPCS | Performed by: INTERNAL MEDICINE

## 2023-03-24 PROCEDURE — 25010000002 PROPOFOL 10 MG/ML EMULSION: Performed by: NURSE ANESTHETIST, CERTIFIED REGISTERED

## 2023-03-24 DEVICE — DEV CLIP ENDO RESOLUTION360 CONTRL ROT 235CM: Type: IMPLANTABLE DEVICE | Site: COLON | Status: FUNCTIONAL

## 2023-03-24 RX ORDER — PROPOFOL 10 MG/ML
VIAL (ML) INTRAVENOUS AS NEEDED
Status: DISCONTINUED | OUTPATIENT
Start: 2023-03-24 | End: 2023-03-24 | Stop reason: SURG

## 2023-03-24 RX ORDER — PROPOFOL 10 MG/ML
VIAL (ML) INTRAVENOUS CONTINUOUS PRN
Status: DISCONTINUED | OUTPATIENT
Start: 2023-03-24 | End: 2023-03-24 | Stop reason: SURG

## 2023-03-24 RX ORDER — LIDOCAINE HYDROCHLORIDE 20 MG/ML
INJECTION, SOLUTION INFILTRATION; PERINEURAL AS NEEDED
Status: DISCONTINUED | OUTPATIENT
Start: 2023-03-24 | End: 2023-03-24 | Stop reason: SURG

## 2023-03-24 RX ORDER — SODIUM CHLORIDE 9 MG/ML
40 INJECTION, SOLUTION INTRAVENOUS AS NEEDED
Status: DISCONTINUED | OUTPATIENT
Start: 2023-03-24 | End: 2023-03-24 | Stop reason: HOSPADM

## 2023-03-24 RX ORDER — GLYCOPYRROLATE 0.2 MG/ML
INJECTION INTRAMUSCULAR; INTRAVENOUS AS NEEDED
Status: DISCONTINUED | OUTPATIENT
Start: 2023-03-24 | End: 2023-03-24 | Stop reason: SURG

## 2023-03-24 RX ORDER — SODIUM CHLORIDE, SODIUM LACTATE, POTASSIUM CHLORIDE, CALCIUM CHLORIDE 600; 310; 30; 20 MG/100ML; MG/100ML; MG/100ML; MG/100ML
30 INJECTION, SOLUTION INTRAVENOUS CONTINUOUS PRN
Status: DISCONTINUED | OUTPATIENT
Start: 2023-03-24 | End: 2023-03-24 | Stop reason: HOSPADM

## 2023-03-24 RX ORDER — CHLORCYCLIZINE HYDROCHLORIDE AND PSEUDOEPHEDRINE HYDROCHLORIDE 25; 60 MG/1; MG/1
1 TABLET ORAL 2 TIMES DAILY WITH MEALS
Qty: 20 TABLET | Refills: 0 | Status: SHIPPED | OUTPATIENT
Start: 2023-03-24

## 2023-03-24 RX ORDER — SODIUM CHLORIDE 0.9 % (FLUSH) 0.9 %
10 SYRINGE (ML) INJECTION AS NEEDED
Status: DISCONTINUED | OUTPATIENT
Start: 2023-03-24 | End: 2023-03-24 | Stop reason: HOSPADM

## 2023-03-24 RX ORDER — SODIUM CHLORIDE 0.9 % (FLUSH) 0.9 %
10 SYRINGE (ML) INJECTION EVERY 12 HOURS SCHEDULED
Status: DISCONTINUED | OUTPATIENT
Start: 2023-03-24 | End: 2023-03-24 | Stop reason: HOSPADM

## 2023-03-24 RX ADMIN — PROPOFOL 120 MG: 10 INJECTION, EMULSION INTRAVENOUS at 08:57

## 2023-03-24 RX ADMIN — LIDOCAINE HYDROCHLORIDE 100 MG: 20 INJECTION, SOLUTION INFILTRATION; PERINEURAL at 08:57

## 2023-03-24 RX ADMIN — SODIUM CHLORIDE, POTASSIUM CHLORIDE, SODIUM LACTATE AND CALCIUM CHLORIDE 30 ML/HR: 600; 310; 30; 20 INJECTION, SOLUTION INTRAVENOUS at 08:18

## 2023-03-24 RX ADMIN — Medication 220 MCG/KG/MIN: at 08:57

## 2023-03-24 RX ADMIN — GLYCOPYRROLATE 0.2 MG: 0.2 INJECTION INTRAMUSCULAR; INTRAVENOUS at 08:57

## 2023-03-24 NOTE — H&P
[facility], [place], operated by [facility]  Pre Procedure History & Physical    Chief Complaint:   GERD, Chawla's esophagus, history of polyps    Subjective     HPI:   This 67-year-old male presents to endoscopy suite for upper and lower endoscopic evaluations.  He has reflux as well as a history of Chawla's and prior history of colon polyps.  Last colonoscopy performed in 2020.    Past Medical History:   Past Medical History:   Diagnosis Date   • Acute sinusitis    • Allergic 1969   • Aortic root dilatation (HCC)    • Aortic stenosis    • Aortic valve stenosis     mild   • Arteriosclerotic coronary artery disease    • Bicuspid aortic valve    • CAD (coronary artery disease)    • Cancer (HCC) 2009    Prostate   • Chest pain    • Colon polyp    • Cough    • Dilated aortic root (HCC)    • ED (erectile dysfunction)    • Erectile dysfunction 2009    Prostatectomy   • Folliculitis    • GERD (gastroesophageal reflux disease)    • Heart murmur 2013    Aortic bicuspid   • Heartburn    • Hyperlipidemia    • Hypertension    • Nonspecific abnormal findings on radiological and examination of intrathoracic organs    • Pneumonia 1996   • Precordial pain    • Reflux esophagitis    • Sore throat    • Thoracic aortic aneurysm without rupture    • Throat pain    • Urinary tract infection 1992       Past Surgical History:  Past Surgical History:   Procedure Laterality Date   • ABDOMINAL SURGERY  Prostate   • CARDIAC CATHETERIZATION  06/2013    Stent placement   • CLOSED REDUCTION FOREARM FRACTURE Right    • COLONOSCOPY     • COLONOSCOPY     • COLONOSCOPY N/A 02/03/2017    Procedure: COLONOSCOPY TO CECUM WITH HOT SNARE POLYPECTOMY WITH NORMAL SALINE INJECTION AND  TWO RESOLUTION CLIPS;  Surgeon: Fam LILLY MD;  Location: [facility] ENDOSCOPY;  Service:    • COLONOSCOPY N/A 01/31/2020    Procedure: COLONOSCOPY TO TI  WITH POLYPECTOMY (COLD SNARE);  Surgeon: Fam Rose MD;  Location: [facility] ENDOSCOPY;  Service:  Gastroenterology   • CORONARY ANGIOPLASTY WITH STENT PLACEMENT     • CORONARY STENT PLACEMENT  2013   • ENDOSCOPY N/A 03/15/2017    Procedure: ESOPHAGOGASTRODUODENOSCOPY WITH BIOPSIES (COLD);  Surgeon: Fam LILLY MD;  Location: [facility] ENDOSCOPY;  Service:    • ENDOSCOPY N/A 2020    Procedure: ESOPHAGOGASTRODUODENOSCOPY WITH BIOPSIES;  Surgeon: Fam Rose MD;  Location: [facility] ENDOSCOPY;  Service: Gastroenterology   • LASIK Bilateral    • PROSTATE SURGERY     • PROSTATE SURGERY     • RHINOPLASTY     • RHINOPLASTY     • SINUS SURGERY     • TONSILLECTOMY     • UPPER GASTROINTESTINAL ENDOSCOPY  2017   • VASECTOMY         Family History:  Family History   Problem Relation Age of Onset   • Heart disease Mother    • Hypertension Mother    • Colon cancer Mother          due to colon cancer   • Cancer Mother         colon   • Hearing loss Mother    • Heart disease Father    • Hypertension Father    • Other Father         CABG, heart valve replacement   • Coronary artery disease Father    • Heart failure Father    • Cancer Father         prostate   • Glaucoma Father    • Cancer Sister         Breast   • Hypertension Sister    • Colon polyps Sister         Recent polyp remival   • Cancer Sister         Breast   • Hypertension Sister    • Malig Hyperthermia Neg Hx        Social History:   reports that he has never smoked. He has never used smokeless tobacco. He reports current alcohol use of about 1.0 standard drink per week. He reports that he does not use drugs.    Medications:   Medications Prior to Admission   Medication Sig Dispense Refill Last Dose   • aspirin 81 MG chewable tablet Chew 1 tablet 2 (Two) Times a Day.      • atorvastatin (LIPITOR) 80 MG tablet TAKE 1 TABLET EVERY DAY 90 tablet 3    • cefdinir (OMNICEF) 300 MG capsule Take 1 capsule by mouth 2 (Two) Times a Day. 20 capsule 0    • esomeprazole (nexIUM) 40 MG capsule TAKE 1 CAPSULE BY MOUTH 2  TIMES A DAY.  180 capsule 3    • fexofenadine (Allegra Allergy) 180 MG tablet Take 1 tablet by mouth Daily.      • fluticasone (FLONASE) 50 MCG/ACT nasal spray 2 sprays into the nostril(s) as directed by provider Daily.      • losartan-hydrochlorothiazide (HYZAAR) 50-12.5 MG per tablet TAKE 1 TABLET EVERY DAY (Patient taking differently: Taking 25MG) 90 tablet 2    • metoprolol succinate XL (TOPROL-XL) 25 MG 24 hr tablet Take 1 tablet by mouth Daily. 90 tablet 3        Allergies:  Lisinopril    ROS:    Pertinent items are noted in HPI, all other systems reviewed and negative     Objective     There were no vitals taken for this visit.    Physical Exam   Constitutional: Pt is oriented to person, place, and time and well-developed, well-nourished, and in no distress.   Mouth/Throat: Oropharynx is clear and moist.   Neck: Normal range of motion.   Cardiovascular: Normal rate, regular rhythm and normal heart sounds.    Pulmonary/Chest: Effort normal and breath sounds normal.   Abdominal: Soft. Nontender  Skin: Skin is warm and dry.   Psychiatric: Mood, memory, affect and judgment normal.     Assessment & Plan     Diagnosis:  GERD  Chawla's esophagus  Polyps    Anticipated Surgical Procedure:  EGD, colonoscopy    The risks, benefits, and alternatives of this procedure have been discussed with the patient or the responsible party- the patient understands and agrees to proceed.

## 2023-03-24 NOTE — DISCHARGE INSTRUCTIONS
For the next 24 hours patient needs to be with a responsible adult.    For 24 hours DO NOT drive, operate machinery, appliances, drink alcohol, make important decisions or sign legal documents.    Start with a light or bland diet if you are feeling sick to your stomach otherwise advance to regular diet as tolerated.    Follow recommendations on procedure report if provided by your doctor.    Call Dr Rose for problems [phone]    Problems may include but not limited to: large amounts of bleeding, trouble breathing, repeated vomiting, severe unrelieved pain, fever or chills.

## 2023-03-24 NOTE — ANESTHESIA POSTPROCEDURE EVALUATION
"Patient: Chino Huber    Procedure Summary     Date: 03/24/23 Room / Location:  [facility] /  [facility]    Anesthesia Start: 0852 Anesthesia Stop: 0939    Procedures:       ESOPHAGOGASTRODUODENOSCOPY with bx (Esophagus)      COLONOSCOPY to cecum/ terminal ileum with biopsy and hot and cold snare polypectomies and resolution clip placement Diagnosis:       Esophagitis      Gastritis      Hiatal hernia      Colon polyps      Diverticulosis      Hemorrhoids      (Gastroesophageal reflux disease, unspecified whether esophagitis present [K21.9])      (Adenomatous polyp of colon, unspecified part of colon [D12.6])      (Chawla's esophagus without dysplasia [K22.70])    Surgeons: Fam Rose MD Provider: Michael Gtz MD    Anesthesia Type: MAC ASA Status: 3          Anesthesia Type: MAC    Vitals  Vitals Value Taken Time   /79 03/24/23 0957   Temp     Pulse 70 03/24/23 0957   Resp 16 03/24/23 0957   SpO2 96 % 03/24/23 0957           Post Anesthesia Care and Evaluation    Patient location during evaluation: bedside  Patient participation: complete - patient participated  Level of consciousness: awake and alert  Pain management: adequate    Airway patency: patent  Anesthetic complications: No anesthetic complications    Cardiovascular status: acceptable  Respiratory status: acceptable  Hydration status: acceptable    Comments: /79 (BP Location: Left arm, Patient Position: Sitting)   Pulse 70   Resp 16   Ht 170.2 cm (67\")   Wt 77.1 kg (170 lb)   SpO2 96%   BMI 26.63 kg/m²       "

## 2023-03-24 NOTE — ANESTHESIA PREPROCEDURE EVALUATION
Anesthesia Evaluation     Patient summary reviewed and Nursing notes reviewed   history of anesthetic complications: PONV  NPO Solid Status: > 8 hours  NPO Liquid Status: > 2 hours           Airway   Mallampati: II  TM distance: <3 FB  Neck ROM: full  Possible difficult intubation  Dental - normal exam     Pulmonary - negative pulmonary ROS and normal exam    breath sounds clear to auscultation  Cardiovascular - normal exam    ECG reviewed  Rhythm: regular  Rate: normal    (+) hypertension 2 medications or greater, valvular problems/murmurs (bicuspid aortic valve) AS, CAD, cardiac stents hyperlipidemia,     ROS comment: Thoracic aortic aneurysm    Neuro/Psych- negative ROS  GI/Hepatic/Renal/Endo    (+)  GERD,    (-) hepatitis, liver disease    Musculoskeletal     (-) neck pain  Abdominal  - normal exam   Substance History - negative use     OB/GYN negative ob/gyn ROS         Other      history of cancer (prostate) remission                      Anesthesia Plan    ASA 3     MAC     intravenous induction     Anesthetic plan, risks, benefits, and alternatives have been provided, discussed and informed consent has been obtained with: patient.

## 2023-03-26 LAB — UREASE TISS QL: NEGATIVE

## 2023-03-27 LAB
LAB AP CASE REPORT: NORMAL
PATH REPORT.FINAL DX SPEC: NORMAL
PATH REPORT.GROSS SPEC: NORMAL

## 2023-03-27 NOTE — TELEPHONE ENCOUNTER
Spoke with pt.  He is not agreeable to having MRA repeated.     He stated that the ECHO in [place] showed the exact same size as the ECHO we completed 8/9/2021.  He already had an MRA last year (4/19/2022).  Not willing to complete again.      Dr Berman and Noemi: JUST ANNMARIE

## 2023-03-27 NOTE — TELEPHONE ENCOUNTER
OK to cancel Echo, but he still needs the MR Angiogram to assess his aortic size, as he did not have equivalent testing in [place], and the echo showed aortic dilation as well. Better measurements of the Aorta are obtained from Ct/MRI, which is why we prefer them.

## 2023-04-14 ENCOUNTER — TELEPHONE (OUTPATIENT)
Dept: GASTROENTEROLOGY | Facility: CLINIC | Age: 68
End: 2023-04-14
Payer: MEDICARE

## 2023-04-14 NOTE — TELEPHONE ENCOUNTER
----- Message from Fam LILLY MD sent at 3/30/2023  3:50 PM EDT -----  Regarding: Biopsy results  Okay to call results, recommend follow-up EGD and colonoscopy in 3 years.  Continue PPI.  Office follow-up annually or call sooner as needed.  ----- Message -----  From: Lab, Background User  Sent: 3/26/2023   9:43 AM EDT  To: Fam LILLY MD

## 2023-04-14 NOTE — TELEPHONE ENCOUNTER
Fam Rose MD  to Me • [facility] [place] [place] [facility] [place] [facility] 1 Pool        4:11 PM  Biopsy obtained at the GE junction simply reflects inflammation of the esophagus associated with acid reflux.  There was no evidence of Chawla's esophagus    **Call to pt.  Advise per path report that duodenal bx normal.  Stomach body with gastritis.  Advise of above.      Polyps that were removed were not cancerous, but precancerous.     Advise per Dr Rose's notes. Verb understanding.  On esomeprazole 40 mg 2x/day.  Angelica Clemente RN.

## 2023-04-14 NOTE — TELEPHONE ENCOUNTER
Message to Dr Rose.     EGD and c/s for 3/24/26 placed in recall and HM.  O/v for 3/24/24 placed in recall.

## 2023-09-06 ENCOUNTER — TELEPHONE (OUTPATIENT)
Dept: CARDIOLOGY | Facility: CLINIC | Age: 68
End: 2023-09-06

## 2023-09-06 ENCOUNTER — OFFICE VISIT (OUTPATIENT)
Dept: CARDIOLOGY | Facility: CLINIC | Age: 68
End: 2023-09-06
Payer: MEDICARE

## 2023-09-06 VITALS
SYSTOLIC BLOOD PRESSURE: 136 MMHG | HEIGHT: 67 IN | HEART RATE: 47 BPM | WEIGHT: 171 LBS | DIASTOLIC BLOOD PRESSURE: 70 MMHG | BODY MASS INDEX: 26.84 KG/M2

## 2023-09-06 DIAGNOSIS — I71.21 ANEURYSM OF ASCENDING AORTA WITHOUT RUPTURE: Primary | ICD-10-CM

## 2023-09-06 DIAGNOSIS — Q23.1 BICUSPID AORTIC VALVE: ICD-10-CM

## 2023-09-06 DIAGNOSIS — I25.10 CORONARY ARTERY DISEASE INVOLVING NATIVE CORONARY ARTERY OF NATIVE HEART WITHOUT ANGINA PECTORIS: ICD-10-CM

## 2023-09-06 DIAGNOSIS — E78.2 MIXED HYPERLIPIDEMIA: ICD-10-CM

## 2023-09-06 DIAGNOSIS — I10 HYPERTENSION, ESSENTIAL: ICD-10-CM

## 2023-09-06 PROCEDURE — 99213 OFFICE O/P EST LOW 20 MIN: CPT | Performed by: INTERNAL MEDICINE

## 2023-09-06 PROCEDURE — 3078F DIAST BP <80 MM HG: CPT | Performed by: INTERNAL MEDICINE

## 2023-09-06 PROCEDURE — 1160F RVW MEDS BY RX/DR IN RCRD: CPT | Performed by: INTERNAL MEDICINE

## 2023-09-06 PROCEDURE — 3075F SYST BP GE 130 - 139MM HG: CPT | Performed by: INTERNAL MEDICINE

## 2023-09-06 PROCEDURE — 1159F MED LIST DOCD IN RCRD: CPT | Performed by: INTERNAL MEDICINE

## 2023-09-06 PROCEDURE — 93000 ELECTROCARDIOGRAM COMPLETE: CPT | Performed by: INTERNAL MEDICINE

## 2023-09-06 NOTE — PROGRESS NOTES
Date of Office Visit: 2023  Encounter Provider: Kisha Berman MD  Place of Service: [facility] CARDIOLOGY  Patient Name: Chino Huber  :1955    Chief complaint  Coronary artery disease, dilated ascending aorta, aortic valve stenosis    History of Present Illness  The patient is a pleasant, 67 year-old gentleman with history of prostate cancer status  post resection, hypertension, and hyperlipidemia who in 2014 was noted to have coronary  artery disease when he presented with chest pain and abnormal stress test. Cardiac catheterization revealed 90% stenosis of the circumflex artery and of the right coronary  artery. The left anterior descending and left main were normal. He underwent drug-coated  stent placement to both the circumflex and the right coronary artery.  He had a CT angio of his chest in May 2015 that revealed stable thoracic aneurysm measuring 4.0 cm.  In 2018 he had an echocardiogram that showed normal systolic function with mild left atrial enlargement, bicuspid aortic valve with mild stenosis with a mean gradient of 10 mmHg and mildly dilated asending aorta measuring 3.6 cm.  CT angiogram of the chest showed stable dilated a sending aorta measuring 4.1 cm at the ascending aortic level.  In 2020 MR angiography of the chest showed asending aortic aneurysm measuring 3.7 cm overall unchanged from CT study where it measured 4.1 cm.  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  He had a stress echocardiogram 2020 that showed no ischemia at a good workload.  LV systolic function was normal with grade 1 diastolic functions that was mild to moderate aortic valve stenosis with a valve area of 1.47 square to mean gradient of 23 mmHg.  RV systolic pressure was normal.  He had a limited echocardiogram a month ago that showed normal systolic and diastolic function with valve area of 1.5 cm.  Mean gradient of 15 mmHg  and a sending aorta measuring 4.1 cm.  Images were adequate.  Follow-up echocardiogram was performed on 8/9/2021 that showed normal systolic function with mild to moderate valve stenosis with mean gradient of 50 mmHg valve area 1.5 cm² ascending aorta measuring 4.1 cm.  Aortic root measuring 4 cm.  He had an MR angiogram of the chest 4/2022 that showed aortic root measuring 3.8 cm in the coronal view of the ascending aorta measuring 4.2 cm.  This is relatively unchanged by comparison to CT scan from 2015.  Follow-up in 3/2023 an echo and MRI had been ordered however neither.  However he was apparently admitted to [facility] chest pain on 3/1/2023.  Records are not available.  However patient states that he had a stress test and echo which were reportedly normal.    He has had no further chest pain shortness of breath palpitations syncope near syncope.  He is walking a mile a day and otherwise active without troubles.  He states his blood pressure at home occasionally in the 130s but often lower.  His heart rate has not been as low as it is today typically it is in the 50s and he remains asymptomatic.        Past Medical History:   Diagnosis Date    Acute sinusitis     Allergic 1969    Aortic root dilatation     Aortic stenosis     Aortic valve stenosis     mild    Arteriosclerotic coronary artery disease     Bicuspid aortic valve     CAD (coronary artery disease)     Cancer 2009    Prostate    Chest pain     Colon polyp     Cough     Dilated aortic root     ED (erectile dysfunction)     Erectile dysfunction 2009    Prostatectomy    Folliculitis     GERD (gastroesophageal reflux disease)     Heart murmur 2013    Aortic bicuspid    Heartburn     Hyperlipidemia     Hypertension     Nonspecific abnormal findings on radiological and examination of intrathoracic organs     Pneumonia 1996    Precordial pain     Reflux esophagitis     Sore throat     Thoracic aortic aneurysm without rupture     Throat pain      Urinary tract infection 1992     Past Surgical History:   Procedure Laterality Date    ABDOMINAL SURGERY  Prostate    CARDIAC CATHETERIZATION  06/2013    Stent placement    CLOSED REDUCTION FOREARM FRACTURE Right     COLONOSCOPY      COLONOSCOPY      COLONOSCOPY N/A 02/03/2017    Procedure: COLONOSCOPY TO CECUM WITH HOT SNARE POLYPECTOMY WITH NORMAL SALINE INJECTION AND  TWO RESOLUTION CLIPS;  Surgeon: Fam LILLY MD;  Location: [facility] ENDOSCOPY;  Service:     COLONOSCOPY N/A 01/31/2020    Procedure: COLONOSCOPY TO TI  WITH POLYPECTOMY (COLD SNARE);  Surgeon: Fam Rose MD;  Location: [facility] ENDOSCOPY;  Service: Gastroenterology    COLONOSCOPY N/A 3/24/2023    Procedure: COLONOSCOPY to cecum/ terminal ileum with biopsy and hot and cold snare polypectomies and resolution clip placement;  Surgeon: Fam Rose MD;  Location: [facility] ENDOSCOPY;  Service: Gastroenterology;  Laterality: N/A;  pre- hx colon polyps   post- polyps, diverticuolosis, hemorrhoids     CORONARY ANGIOPLASTY WITH STENT PLACEMENT      CORONARY STENT PLACEMENT  06/2013    ENDOSCOPY N/A 03/15/2017    Procedure: ESOPHAGOGASTRODUODENOSCOPY WITH BIOPSIES (COLD);  Surgeon: Fam LILLY MD;  Location: [facility] ENDOSCOPY;  Service:     ENDOSCOPY N/A 01/31/2020    Procedure: ESOPHAGOGASTRODUODENOSCOPY WITH BIOPSIES;  Surgeon: Fam Rose MD;  Location: [facility] ENDOSCOPY;  Service: Gastroenterology    ENDOSCOPY N/A 3/24/2023    Procedure: ESOPHAGOGASTRODUODENOSCOPY with bx;  Surgeon: Fam Rose MD;  Location: [facility] ENDOSCOPY;  Service: Gastroenterology;  Laterality: N/A;  pre- reflux,hx  barretts  post- small hiatal hernia, gastirts, esophagitis     LASIK Bilateral     PROSTATE SURGERY      PROSTATE SURGERY      RHINOPLASTY      RHINOPLASTY      SINUS SURGERY  1987/1988    TONSILLECTOMY  1962    UPPER GASTROINTESTINAL ENDOSCOPY  03/2017    VASECTOMY  1990     Outpatient Medications Prior to Visit    Medication Sig Dispense Refill    aspirin 81 MG chewable tablet Chew 1 tablet 2 (Two) Times a Day.      atorvastatin (LIPITOR) 80 MG tablet TAKE 1 TABLET EVERY DAY 90 tablet 3    esomeprazole (nexIUM) 40 MG capsule TAKE 1 CAPSULE BY MOUTH 2  TIMES A DAY. 180 capsule 3    losartan-hydrochlorothiazide (HYZAAR) 50-12.5 MG per tablet TAKE 1 TABLET EVERY DAY 90 tablet 2    metoprolol succinate XL (TOPROL-XL) 25 MG 24 hr tablet Take 1 tablet by mouth Daily. 90 tablet 3    cefdinir (OMNICEF) 300 MG capsule Take 1 capsule by mouth 2 (Two) Times a Day. 20 capsule 0    Chlorcyclizine-Pseudoephed (Stahist AD) 25-60 MG tablet Take 1 tablet by mouth 2 (Two) Times a Day With Meals. 20 tablet 0    fluticasone (FLONASE) 50 MCG/ACT nasal spray 2 sprays into the nostril(s) as directed by provider Daily.       No facility-administered medications prior to visit.       Allergies as of 2023 - Reviewed 2023   Allergen Reaction Noted    Lisinopril Cough 2019     Social History     Socioeconomic History    Marital status:    Tobacco Use    Smoking status: Never    Smokeless tobacco: Never    Tobacco comments:     caffeine use   Substance and Sexual Activity    Alcohol use: Yes     Alcohol/week: 1.0 standard drink     Types: 1 Cans of beer per week    Drug use: No    Sexual activity: Not Currently     Partners: Female     Birth control/protection: Surgical     Comment: Vasectomy, Prostatectomy     Family History   Problem Relation Age of Onset    Heart disease Mother     Hypertension Mother     Colon cancer Mother          due to colon cancer    Cancer Mother         colon    Hearing loss Mother     Heart disease Father     Hypertension Father     Other Father         CABG, heart valve replacement    Coronary artery disease Father     Heart failure Father     Cancer Father         prostate    Glaucoma Father     Cancer Sister         Breast    Hypertension Sister     Colon polyps Sister         Recent polyp  "remival    Cancer Sister         Breast    Hypertension Sister     Malig Hyperthermia Neg Hx      Review of Systems   Constitutional: Negative for chills, fever, weight gain and weight loss.   Cardiovascular:  Negative for leg swelling.   Respiratory:  Negative for cough, snoring and wheezing.    Hematologic/Lymphatic: Negative for bleeding problem. Does not bruise/bleed easily.   Skin:  Negative for color change.   Musculoskeletal:  Negative for falls, joint pain and myalgias.   Gastrointestinal:  Negative for melena.   Genitourinary:  Negative for hematuria.   Neurological:  Negative for excessive daytime sleepiness.   Psychiatric/Behavioral:  Negative for depression. The patient is not nervous/anxious.       Objective:     Vitals:    09/06/23 1018   BP: 136/70   Pulse: (!) 47   Weight: 77.6 kg (171 lb)   Height: 170.2 cm (67\")     Body mass index is 26.78 kg/m².    Vitals reviewed.   Constitutional:       Appearance: Well-developed.   Eyes:      General: No scleral icterus.        Right eye: No discharge.      Conjunctiva/sclera: Conjunctivae normal.      Pupils: Pupils are equal, round, and reactive to light.   HENT:      Head: Normocephalic.      Nose: Nose normal.   Neck:      Thyroid: No thyromegaly.      Vascular: No JVD.   Pulmonary:      Effort: Pulmonary effort is normal. No respiratory distress.      Breath sounds: Normal breath sounds. No wheezing. No rales.   Cardiovascular:      Bradycardia present. Regular rhythm. Normal S1. Normal S2.       Murmurs: There is a harsh midsystolic murmur at the URSB, radiating to the neck.      No gallop.    Pulses:     Intact distal pulses.      Carotid: 2+ bilaterally.     Radial: 2+ bilaterally.     Femoral: 2+ bilaterally.     Popliteal: 2+ bilaterally.     Dorsalis pedis: 2+ bilaterally.     Posterior tibial: 2+ bilaterally.  Edema:     Peripheral edema absent.   Abdominal:      General: Bowel sounds are normal. There is no distension.      Palpations: Abdomen is " soft.      Tenderness: There is no abdominal tenderness. There is no rebound.   Musculoskeletal: Normal range of motion.         General: No tenderness.      Cervical back: Normal range of motion and neck supple. Skin:     General: Skin is warm and dry.      Findings: No erythema or rash.   Neurological:      Mental Status: Alert and oriented to person, place, and time.   Psychiatric:         Behavior: Behavior normal.         Thought Content: Thought content normal.         Judgment: Judgment normal.     Lab Review:   Lab Results - Last 18 Months   Lab Units 10/06/22  0922   WBC 10*3/mm3 9.36   RBC 10*6/mm3 5.34   HEMOGLOBIN g/dL 15.8   HEMATOCRIT % 45.3   MCV fL 84.8   MCH pg 29.6   MCHC g/dL 34.9   RDW % 13.0   PLATELETS 10*3/mm3 274   NEUTROPHIL % % 61.8   LYMPHOCYTE % % 27.4   MONOCYTES % % 8.1   EOSINOPHIL % % 1.8   BASOPHIL % % 0.6   NEUTROS ABS 10*3/mm3 5.78   LYMPHS ABS 10*3/mm3 2.56   MONOS ABS 10*3/mm3 0.76   EOS ABS 10*3/mm3 0.17   BASOS ABS 10*3/mm3 0.06   IMM GRAN % % 0.3   IMMATURE GRANS (ABS) 10*3/mm3 0.03       Lab Results - Last 18 Months   Lab Units 10/06/22  0922 04/19/22  0647   GLUCOSE mg/dL 94  --    BUN mg/dL 18  --    CREATININE mg/dL 1.12 1.10   SODIUM mmol/L 139  --    POTASSIUM mmol/L 4.5  --    CHLORIDE mmol/L 100  --    CO2 mmol/L 30.0*  --    CALCIUM mg/dL 9.6  --    ALBUMIN g/dL 4.60  --    ALT (SGPT) U/L 27  --    AST (SGOT) U/L 22  --    ALK PHOS U/L 101  --    BILIRUBIN mg/dL 0.5  --    BUN / CREAT RATIO  16.1  --      Lab Results - Last 18 Months   Lab Units 10/06/22  0922   TRIGLYCERIDES mg/dL 122   HDL CHOL mg/dL 41   LDL CHOL mg/dL 62   VLDL CHOLESTEROL MT mg/dL 22         ECG 12 Lead    Date/Time: 9/6/2023 10:28 AM  Performed by: Kisha Berman MD  Authorized by: Kisha Berman MD   Comparison: compared with previous ECG   Comparison to previous ECG: Heart rate is slower  Rhythm: sinus bradycardia    Clinical impression: abnormal EKG          Diagnosis Plan   1. Aneurysm of  ascending aorta without rupture        2. Hypertension, essential  ECG 12 Lead      3. Mixed hyperlipidemia        4. Coronary artery disease involving native coronary artery of native heart without angina pectoris  ECG 12 Lead      5. Bicuspid aortic valve          Plan:       1.  Coronary artery disease with history of prior stenting 2014 and negative stress echo 8/2020.  Admitted to [facility] 3/2023 with chest pain.  Had an echo and pharmacologic stress test but results are unavailable.  He reports these were normal.  We will try to get a copy of these for review.  Clinically he is doing well without any anginal symptoms.  2.  Mildly dilated ascending aorta, stable on CT in September 2018 and by MRI in February 2010.  Stable on echo 8/2021 measuring 4.1 cm similar to prior CTs.   findings MR angiogram 4/2022 (4.2 cm of the ascending aorta) also with similar findings.  Overall stable since 2015.  He is doing well clinically we will plan on MR angiogram in 6 months at follow-up.  3.  Mild to moderate aortic valve stenosis with a bicuspid aortic vavle.  Stable by echo 8/2021.  Await echo results of study performed at [facility] 3/2023.  Plan to repeat one in 6 months  4.  Hyperlipidemia.  No labs this year  5.  Hypertension.  Pressure slightly elevated today  6.  Chest pain attributed to esophagitis.  Noted plans for repeat EGD and colonoscopy in 3 years  7.  Bradycardia.  Intermittent sinus bradycardia and asymptomatic.  He is on metoprolol in the setting of aneurysmal disease.  We will continue with this for now have him call if heart rate remains below 50 bpm consistently      Time Spent: I spent 25 minutes caring for Chino on this date of service. This time includes time spent by me in the following activities: preparing for the visit, reviewing tests, obtaining and/or reviewing a separately obtained history, performing a medically appropriate examination and/or evaluation, counseling and educating  the patient/family/caregiver, documenting information in the medical record, and independently interpreting results and communicating that information with the patient/family/caregiver.   I spent 1 minutes on the separately reported service of ECG. This time is not included in the time used to support the E/M service also reported today.        Your medication list            Accurate as of September 6, 2023 11:59 PM. If you have any questions, ask your nurse or doctor.                CONTINUE taking these medications        Instructions Last Dose Given Next Dose Due   aspirin 81 MG chewable tablet      Chew 1 tablet 2 (Two) Times a Day.       atorvastatin 80 MG tablet  Commonly known as: LIPITOR      TAKE 1 TABLET EVERY DAY       esomeprazole 40 MG capsule  Commonly known as: nexIUM      TAKE 1 CAPSULE BY MOUTH 2  TIMES A DAY.       losartan-hydrochlorothiazide 50-12.5 MG per tablet  Commonly known as: HYZAAR      TAKE 1 TABLET EVERY DAY       metoprolol succinate XL 25 MG 24 hr tablet  Commonly known as: TOPROL-XL      Take 1 tablet by mouth Daily.                Patient is no longer taking -.  I corrected the med list to reflect this.  I did not stop these medications.      Dictated utilizing Dragon dictation

## 2023-09-08 ENCOUNTER — TELEPHONE (OUTPATIENT)
Dept: CARDIOLOGY | Facility: CLINIC | Age: 68
End: 2023-09-08

## 2023-09-09 NOTE — TELEPHONE ENCOUNTER
Please get me the copy of echo and stress test performed in March 2023 at [facility].  I am not able to locate this in epic.

## 2023-09-11 NOTE — TELEPHONE ENCOUNTER
Pt stated that he had this completed at [facility].  I have faxed requested to [phone].    Phone: [phone]

## 2023-09-12 ENCOUNTER — TELEPHONE (OUTPATIENT)
Dept: INTERNAL MEDICINE | Facility: CLINIC | Age: 68
End: 2023-09-12
Payer: MEDICARE

## 2023-09-12 NOTE — TELEPHONE ENCOUNTER
Would like for someone to call him regarding shots and or vaccines for he and his wife, etc. He is planning a trip to [place] in October.

## 2023-09-18 ENCOUNTER — OFFICE VISIT (OUTPATIENT)
Dept: INTERNAL MEDICINE | Facility: CLINIC | Age: 68
End: 2023-09-18
Payer: MEDICARE

## 2023-09-18 VITALS
DIASTOLIC BLOOD PRESSURE: 80 MMHG | TEMPERATURE: 97.7 F | BODY MASS INDEX: 26.78 KG/M2 | WEIGHT: 170.6 LBS | OXYGEN SATURATION: 98 % | HEART RATE: 57 BPM | SYSTOLIC BLOOD PRESSURE: 130 MMHG | HEIGHT: 67 IN

## 2023-09-18 DIAGNOSIS — Z71.84 TRAVEL ADVICE ENCOUNTER: Primary | ICD-10-CM

## 2023-09-18 DIAGNOSIS — Z71.85 VACCINE COUNSELING: ICD-10-CM

## 2023-09-18 PROBLEM — J01.01 ACUTE RECURRENT MAXILLARY SINUSITIS: Status: RESOLVED | Noted: 2023-03-15 | Resolved: 2023-09-18

## 2023-09-18 PROCEDURE — 99212 OFFICE O/P EST SF 10 MIN: CPT | Performed by: NURSE PRACTITIONER

## 2023-09-18 PROCEDURE — 3079F DIAST BP 80-89 MM HG: CPT | Performed by: NURSE PRACTITIONER

## 2023-09-18 PROCEDURE — 3075F SYST BP GE 130 - 139MM HG: CPT | Performed by: NURSE PRACTITIONER

## 2023-09-18 NOTE — PROGRESS NOTES
Chief Complaint   Patient presents with    Travel Consult     Traveling to [place], meningitis vaccine        Subjective     Chino Huber is a 67 y.o. male being seen for an acute appointment today regarding travel vaccines. He is going to [place] with [employer]. He does not need Yellow fever at this time. He is requesting a meningitis and typhoid today. He took the Flu vaccine Friday    Answers submitted by the patient for this visit:  Primary Reason for Visit (Submitted on 9/13/2023)  What is the primary reason for your visit?: Other  Other (Submitted on 9/13/2023)  Please describe your symptoms.: Immunization for meningitis die to travel requirements  Have you had these symptoms before?: No  How long have you been having these symptoms?: 1-4 days  Please list any medications you are currently taking for this condition.: Na  Please describe any probable cause for these symptoms. : Na      History of Present Illness     Allergies   Allergen Reactions    Lisinopril Cough         Current Outpatient Medications:     aspirin 81 MG chewable tablet, Chew 1 tablet 2 (Two) Times a Day., Disp: , Rfl:     atorvastatin (LIPITOR) 80 MG tablet, TAKE 1 TABLET EVERY DAY, Disp: 90 tablet, Rfl: 3    esomeprazole (nexIUM) 40 MG capsule, TAKE 1 CAPSULE BY MOUTH 2  TIMES A DAY., Disp: 180 capsule, Rfl: 3    losartan-hydrochlorothiazide (HYZAAR) 50-12.5 MG per tablet, TAKE 1 TABLET EVERY DAY, Disp: 90 tablet, Rfl: 2    metoprolol succinate XL (TOPROL-XL) 25 MG 24 hr tablet, Take 1 tablet by mouth Daily., Disp: 90 tablet, Rfl: 3    The following portions of the patient's history were reviewed and updated as appropriate: allergies, current medications, past family history, past medical history, past social history, past surgical history, and problem list.    Review of Systems   Constitutional: Negative.    HENT: Negative.     Eyes:  Negative for visual disturbance.   Cardiovascular: Negative.  Negative for palpitations.   Gastrointestinal:  Negative.    Genitourinary: Negative.    Musculoskeletal: Negative.    Skin: Negative.  Negative for color change.   All other systems reviewed and are negative.    Assessment     Physical Exam  Vitals reviewed.   Constitutional:       Appearance: Normal appearance.   Cardiovascular:      Rate and Rhythm: Normal rate and regular rhythm.      Heart sounds: Murmur heard.   Pulmonary:      Effort: Pulmonary effort is normal.      Breath sounds: Normal breath sounds.   Skin:     General: Skin is warm and dry.   Neurological:      Mental Status: He is alert and oriented to person, place, and time.   Psychiatric:         Mood and Affect: Mood normal.         Behavior: Behavior normal.         Thought Content: Thought content normal.       Plan         Diagnoses and all orders for this visit:    1. Travel advice encounter (Primary)    2. Vaccine counseling    He received the Flu vaccine Friday at a pharmacy, discuss  vaccine by 4 weeks.     [facility] at [phone] called and I spoke with Lissa to discuss typhoid and meningitis vaccines. Number given to patient to call for appt

## 2023-09-19 NOTE — TELEPHONE ENCOUNTER
CARDIOLOGY VISIT - SCAN - [facility] ECHO LAB, [facility], TRANSTHORACIC ECHOCARDIOGRAM RPT (03/11/2023)     Scanned in.  Just click link above.

## 2023-10-16 NOTE — TELEPHONE ENCOUNTER
Please let patient know that the aortic valve stenosis looked about the same on the echo in March at [facility].  Aorta appeared to be similar in size as before, slightly enlarged.  Continue with plans for MRI in the next several months

## 2023-10-17 NOTE — TELEPHONE ENCOUNTER
Spoke with pt.  Verbalized understanding and plan.  He will call if any issues in the meantime.  No other questions.  (Done)

## 2023-11-06 ENCOUNTER — OFFICE VISIT (OUTPATIENT)
Dept: INTERNAL MEDICINE | Facility: CLINIC | Age: 68
End: 2023-11-06
Payer: MEDICARE

## 2023-11-06 VITALS
DIASTOLIC BLOOD PRESSURE: 84 MMHG | HEIGHT: 67 IN | BODY MASS INDEX: 26.71 KG/M2 | WEIGHT: 170.2 LBS | HEART RATE: 77 BPM | OXYGEN SATURATION: 96 % | TEMPERATURE: 97.5 F | SYSTOLIC BLOOD PRESSURE: 120 MMHG

## 2023-11-06 DIAGNOSIS — I10 HYPERTENSION, ESSENTIAL: ICD-10-CM

## 2023-11-06 DIAGNOSIS — U07.1 COVID-19: ICD-10-CM

## 2023-11-06 DIAGNOSIS — E78.2 MIXED HYPERLIPIDEMIA: ICD-10-CM

## 2023-11-06 DIAGNOSIS — Z91.09 ENVIRONMENTAL ALLERGIES: ICD-10-CM

## 2023-11-06 DIAGNOSIS — Z00.00 ENCOUNTER FOR SUBSEQUENT ANNUAL WELLNESS VISIT (AWV) IN MEDICARE PATIENT: Primary | ICD-10-CM

## 2023-11-06 DIAGNOSIS — Q23.1 BICUSPID AORTIC VALVE: ICD-10-CM

## 2023-11-06 DIAGNOSIS — J06.9 URI WITH COUGH AND CONGESTION: ICD-10-CM

## 2023-11-06 DIAGNOSIS — Z12.5 SCREENING FOR PROSTATE CANCER: ICD-10-CM

## 2023-11-06 DIAGNOSIS — I25.10 CORONARY ARTERY DISEASE INVOLVING NATIVE CORONARY ARTERY OF NATIVE HEART WITHOUT ANGINA PECTORIS: ICD-10-CM

## 2023-11-06 PROBLEM — Z71.85 VACCINE COUNSELING: Status: RESOLVED | Noted: 2023-09-18 | Resolved: 2023-11-06

## 2023-11-06 PROBLEM — Z23 NEED FOR VACCINATION: Status: RESOLVED | Noted: 2019-04-30 | Resolved: 2023-11-06

## 2023-11-06 LAB
EXPIRATION DATE: ABNORMAL
INTERNAL CONTROL: ABNORMAL
Lab: ABNORMAL
SARS-COV-2 AG UPPER RESP QL IA.RAPID: DETECTED

## 2023-11-06 PROCEDURE — 3074F SYST BP LT 130 MM HG: CPT | Performed by: NURSE PRACTITIONER

## 2023-11-06 PROCEDURE — 87426 SARSCOV CORONAVIRUS AG IA: CPT | Performed by: NURSE PRACTITIONER

## 2023-11-06 PROCEDURE — 99214 OFFICE O/P EST MOD 30 MIN: CPT | Performed by: NURSE PRACTITIONER

## 2023-11-06 PROCEDURE — G0439 PPPS, SUBSEQ VISIT: HCPCS | Performed by: NURSE PRACTITIONER

## 2023-11-06 PROCEDURE — 3079F DIAST BP 80-89 MM HG: CPT | Performed by: NURSE PRACTITIONER

## 2023-11-06 PROCEDURE — 1170F FXNL STATUS ASSESSED: CPT | Performed by: NURSE PRACTITIONER

## 2023-11-06 RX ORDER — CHLORCYCLIZINE HYDROCHLORIDE AND PSEUDOEPHEDRINE HYDROCHLORIDE 25; 60 MG/1; MG/1
1 TABLET ORAL EVERY 12 HOURS PRN
Start: 2023-11-06

## 2023-11-06 NOTE — PROGRESS NOTES
The ABCs of the Annual Wellness Visit  Subsequent Medicare Wellness Visit    Subjective    Chino Huber is a 68 y.o. male who presents for a Subsequent Medicare Wellness Visit.    The following portions of the patient's history were reviewed and   updated as appropriate: allergies, current medications, past family history, past medical history, past social history, past surgical history, and problem list.    Compared to one year ago, the patient feels his physical   health is the same.    Compared to one year ago, the patient feels his mental   health is the same.    Recent Hospitalizations:  He was not admitted to the hospital during the last year.       Current Medical Providers:  Patient Care Team:  Laura Barron APRN as PCP - General (Family Medicine)  Kisha Berman MD as Consulting Physician (Cardiology)  Michael Martinez Jr., MD as Consulting Physician (Urology)  Fam Rose MD as Consulting Physician (Gastroenterology)    Outpatient Medications Prior to Visit   Medication Sig Dispense Refill    aspirin 81 MG chewable tablet Chew 1 tablet 2 (Two) Times a Day.      atorvastatin (LIPITOR) 80 MG tablet TAKE 1 TABLET EVERY DAY 90 tablet 3    azelastine (ASTELIN) 0.1 % nasal spray       esomeprazole (nexIUM) 40 MG capsule TAKE 1 CAPSULE BY MOUTH 2  TIMES A DAY. 180 capsule 3    losartan-hydrochlorothiazide (HYZAAR) 50-12.5 MG per tablet TAKE 1 TABLET EVERY DAY 90 tablet 2    metoprolol succinate XL (TOPROL-XL) 25 MG 24 hr tablet Take 1 tablet by mouth Daily. 90 tablet 3    Pseudoeph-Doxylamine-DM-APAP (NYQUIL PO) Take  by mouth.      Pseudoephedrine-APAP-DM (DAYQUIL PO) Take  by mouth.      predniSONE (DELTASONE) 50 MG tablet Take 1 tablet by mouth Daily With Breakfast. 5 tablet 0     No facility-administered medications prior to visit.       No opioid medication identified on active medication list. I have reviewed chart for other potential  high risk medication/s and harmful drug interactions in the  "elderly.        Aspirin is on active medication list. Aspirin use is indicated based on review of current medical condition/s. Pros and cons of this therapy have been discussed today. Benefits of this medication outweigh potential harm.  Patient has been encouraged to continue taking this medication.  .      Patient Active Problem List   Diagnosis    Coronary artery disease involving native coronary artery of native heart without angina pectoris    GERD without esophagitis    Hyperlipidemia    Hypertension, essential    Aortic root dilatation    Nonrheumatic aortic valve stenosis    Bicuspid aortic valve    Thoracic aortic aneurysm without rupture    Encounter for subsequent annual wellness visit (AWV) in Medicare patient    Need for vaccination    URI with cough and congestion    Environmental allergies    Cough due to ACE inhibitor    Globus sensation    Dysphagia    Adenomatous polyp of colon    FH: colon cancer in first degree relative <60 years old    History of adenocarcinoma of prostate    Chawla's esophagus without dysplasia    Altitude sickness, subsequent encounter    Vaccine counseling     Advance Care Planning   Advance Care Planning     Advance Directive is not on file.  ACP discussion was held with the patient during this visit. Patient has an advance directive (not in EMR), copy requested.     Objective    Vitals:    11/06/23 0758   BP: 120/84   BP Location: Left arm   Patient Position: Sitting   Cuff Size: Adult   Pulse: 77   Temp: 97.5 °F (36.4 °C)   TempSrc: Infrared   SpO2: 96%   Weight: 77.2 kg (170 lb 3.2 oz)   Height: 170.2 cm (67.01\")     Estimated body mass index is 26.65 kg/m² as calculated from the following:    Height as of this encounter: 170.2 cm (67.01\").    Weight as of this encounter: 77.2 kg (170 lb 3.2 oz).    BMI is >= 25 and <30. (Overweight) The following options were offered after discussion;: exercise counseling/recommendations and nutrition " counseling/recommendations      Does the patient have evidence of cognitive impairment? No    Lab Results   Component Value Date    CHLPL 121 10/30/2023    TRIG 98 10/30/2023    HDL 49 10/30/2023    LDL 54 10/30/2023    VLDL 18 10/30/2023        HEALTH RISK ASSESSMENT    Smoking Status:  Social History     Tobacco Use   Smoking Status Never   Smokeless Tobacco Never   Tobacco Comments    caffeine use     Alcohol Consumption:  Social History     Substance and Sexual Activity   Alcohol Use Yes    Alcohol/week: 1.0 standard drink of alcohol    Types: 1 Cans of beer per week     Fall Risk Screen:    FAUSTO Fall Risk Assessment was completed, and patient is at LOW risk for falls.Assessment completed on:2023    Depression Screenin/6/2023     8:05 AM   PHQ-2/PHQ-9 Depression Screening   Little Interest or Pleasure in Doing Things 0-->not at all   Feeling Down, Depressed or Hopeless 0-->not at all   PHQ-9: Brief Depression Severity Measure Score 0       Health Habits and Functional and Cognitive Screenin/6/2023     8:04 AM   Functional & Cognitive Status   Do you have difficulty preparing food and eating? No   Do you have difficulty bathing yourself, getting dressed or grooming yourself? No   Do you have difficulty using the toilet? No   Do you have difficulty moving around from place to place? No   Do you have trouble with steps or getting out of a bed or a chair? No   Current Diet Well Balanced Diet   Dental Exam Up to date   Eye Exam Up to date   Exercise (times per week) 7 times per week   Current Exercises Include Walking   Do you need help using the phone?  No   Are you deaf or do you have serious difficulty hearing?  No   Do you need help to go to places out of walking distance? No   Do you need help shopping? No   Do you need help preparing meals?  No   Do you need help with housework?  No   Do you need help with laundry? No   Do you need help taking your medications? No   Do you need help  managing money? No   Do you ever drive or ride in a car without wearing a seat belt? No   Have you felt unusual stress, anger or loneliness in the last month? No   Who do you live with? Spouse   If you need help, do you have trouble finding someone available to you? No   Do you have difficulty concentrating, remembering or making decisions? No       Age-appropriate Screening Schedule:  Refer to the list below for future screening recommendations based on patient's age, sex and/or medical conditions. Orders for these recommended tests are listed in the plan section. The patient has been provided with a written plan.    Health Maintenance   Topic Date Due    BMI FOLLOWUP  Never done    COVID-19 Vaccine (4 - 2023-24 season) 09/01/2023    ANNUAL WELLNESS VISIT  10/24/2023    Pneumococcal Vaccine 65+ (3 - PPSV23 or PCV20) 04/30/2024    LIPID PANEL  10/30/2024    GASTROSCOPY (EGD)  03/24/2026    COLORECTAL CANCER SCREENING  03/24/2026    TDAP/TD VACCINES (3 - Td or Tdap) 08/01/2028    HEPATITIS C SCREENING  Completed    INFLUENZA VACCINE  Completed    ZOSTER VACCINE  Completed                  CMS Preventative Services Quick Reference  Risk Factors Identified During Encounter  Immunizations Discussed/Encouraged: COVID19  The above risks/problems have been discussed with the patient.  Pertinent information has been shared with the patient in the After Visit Summary.  An After Visit Summary and PPPS were made available to the patient.    Follow Up:   Next Medicare Wellness visit to be scheduled in 1 year.       Additional E&M Note during same encounter follows:  Patient has multiple medical problems which are significant and separately identifiable that require additional work above and beyond the Medicare Wellness Visit.      Chief Complaint  Fever, Cough, Sore Throat, and Nasal Congestion (Symptoms started last wednesday)    Subjective        HPI  Chino Huber is also being seen today for HTN, CAD, GERD, hs prostate  "cancer     He is on Losartan 50/12.5mg daily and Toprol XL 25mg  for HTN. He checks his BP at home, running 120s-130s/80s. He denies CP, SOA, edema.     He takes Lipitor 80mg for cholesterol control and Nexium 40mg daily for GERD.     He is followed by Dr. Berman for Aortic valve stenosis and CAD with stent placement in 2014. Next ECHO scheduled March 2024. Progress Notes by Kisha Berman MD (09/06/2023 10:20) He had tried a higher dose of Toprol XL but it lowered his HR too much. Losartan 100/25mg caused him to feel lightheaded. He walks a mile per day, and works on planes and cars.      He is has been acutely ill for the past 2 weeks. He reports a fever Friday night. No, with sore throat, congestion, and sinus pressure. He is on dayquil and Nyquil.      He is followed by Dr. Martinez for hx of prostate cancer in 2009.     Review of Systems   Constitutional:  Positive for fatigue and fever.   HENT:  Positive for congestion.    Eyes: Negative.    Respiratory: Negative.  Negative for shortness of breath and stridor.    Cardiovascular:  Negative for chest pain, palpitations and leg swelling.   Gastrointestinal: Negative.    Endocrine: Negative.    Genitourinary: Negative.    Musculoskeletal:  Positive for arthralgias.   Allergic/Immunologic: Positive for environmental allergies.   Neurological: Negative.    Hematological: Negative.    Psychiatric/Behavioral: Negative.     All other systems reviewed and are negative.      Objective   Vital Signs:  /84 (BP Location: Left arm, Patient Position: Sitting, Cuff Size: Adult)   Pulse 77   Temp 97.5 °F (36.4 °C) (Infrared)   Ht 170.2 cm (67.01\")   Wt 77.2 kg (170 lb 3.2 oz)   SpO2 96%   BMI 26.65 kg/m²     Physical Exam  Vitals reviewed.   Constitutional:       Appearance: Normal appearance. He is not ill-appearing.   HENT:      Right Ear: Tympanic membrane normal.      Left Ear: Tympanic membrane normal.      Nose: Congestion and rhinorrhea present.   Cardiovascular:    "   Rate and Rhythm: Normal rate and regular rhythm.      Pulses: Normal pulses.      Heart sounds: Murmur (grade 2 systolic murmur heard over aoric area) heard.   Pulmonary:      Effort: Pulmonary effort is normal. No respiratory distress.      Breath sounds: Normal breath sounds. No stridor.   Musculoskeletal:      Cervical back: Neck supple.      Right lower leg: No edema.      Left lower leg: No edema.   Skin:     General: Skin is warm and dry.   Neurological:      Mental Status: He is alert and oriented to person, place, and time.   Psychiatric:         Mood and Affect: Mood normal.         Behavior: Behavior normal.         Thought Content: Thought content normal.          The following data was reviewed by: GARY Graves on 11/06/2023:  CBC w/diff          10/30/2023    08:03   CBC w/Diff   WBC 13.64    RBC 5.64    Hemoglobin 16.1    Hematocrit 46.7    MCV 82.8    MCH 28.5    MCHC 34.5    RDW 13.5    Platelets 287    Neutrophil Rel % 61.9    Lymphocyte Rel % 27.7    Monocyte Rel % 8.9    Eosinophil Rel % 0.6    Basophil Rel % 0.4      Lipid Panel          10/30/2023    08:03   Lipid Panel   Total Cholesterol 121    Triglycerides 98    HDL Cholesterol 49    VLDL Cholesterol 18    LDL Cholesterol  54          Data reviewed : Cardiology studies ECG and Consultant notes Cardio, urology           Assessment and Plan      Diagnosis Plan   1. Encounter for subsequent annual wellness visit (AWV) in Medicare patient        2. URI with cough and congestion  POCT VERITOR SARS-CoV-2 Antigen      3. Hypertension, essential  CBC & Differential    Comprehensive Metabolic Panel    Lipid Panel With / Chol / HDL Ratio    BP at goal on Toprol XL 25mg daily and Losartan 50/12.5mg daily      4. Mixed hyperlipidemia  CBC & Differential    Comprehensive Metabolic Panel    Lipid Panel With / Chol / HDL Ratio    LDL at goal < 70. On statin therapy lipitor 80mg nightly      5. Coronary artery disease involving native coronary artery  of native heart without angina pectoris  CBC & Differential    Comprehensive Metabolic Panel    Lipid Panel With / Chol / HDL Ratio      6. Screening for prostate cancer  PSA Screen      7. Bicuspid aortic valve        8. COVID-19  Nirmatrelvir&Ritonavir 300/100 (PAXLOVID) 20 x 150 MG & 10 x 100MG tablet therapy pack tablet    Hold Lipitor for 5 days, while on Paxlovid      9. Environmental allergies  Chlorcyclizine-Pseudoephed (Stahist AD) 25-60 MG tablet             BMI is >= 25 and <30. (Overweight) The following options were offered after discussion;: exercise counseling/recommendations and nutrition counseling/recommendations        I spent 45 minutes caring for Chino on this date of service. This time includes time spent by me in the following activities:preparing for the visit, reviewing tests, obtaining and/or reviewing a separately obtained history, performing a medically appropriate examination and/or evaluation , counseling and educating the patient/family/caregiver, ordering medications, tests, or procedures, referring and communicating with other health care professionals , and documenting information in the medical record  Follow Up     1 year AWV    Patient was given instructions and counseling regarding his condition or for health maintenance advice. Please see specific information pulled into the AVS if appropriate.

## 2023-11-16 RX ORDER — LOSARTAN POTASSIUM AND HYDROCHLOROTHIAZIDE 12.5; 5 MG/1; MG/1
TABLET ORAL
Qty: 90 TABLET | Refills: 1 | Status: SHIPPED | OUTPATIENT
Start: 2023-11-16

## 2023-11-16 NOTE — TELEPHONE ENCOUNTER
Spoke with pt.  He said PCP was not planning any labs until 10/2024.  He wants to know if we can refill until he comes in for his appointment in Feb where he will get a BMP if we place an order.

## 2023-12-21 RX ORDER — ATORVASTATIN CALCIUM 80 MG/1
TABLET, FILM COATED ORAL
Qty: 90 TABLET | Refills: 3 | Status: SHIPPED | OUTPATIENT
Start: 2023-12-21

## 2024-02-12 ENCOUNTER — HOSPITAL ENCOUNTER (OUTPATIENT)
Dept: MRI IMAGING | Facility: HOSPITAL | Age: 69
Discharge: HOME | End: 2024-02-12
Payer: MEDICARE

## 2024-02-12 ENCOUNTER — HOSPITAL ENCOUNTER (OUTPATIENT)
Dept: CARDIOLOGY | Facility: HOSPITAL | Age: 69
Discharge: HOME | End: 2024-02-12
Payer: MEDICARE

## 2024-02-12 VITALS
DIASTOLIC BLOOD PRESSURE: 66 MMHG | OXYGEN SATURATION: 96 % | WEIGHT: 170 LBS | SYSTOLIC BLOOD PRESSURE: 128 MMHG | HEART RATE: 64 BPM | HEIGHT: 67 IN | BODY MASS INDEX: 26.68 KG/M2

## 2024-02-12 DIAGNOSIS — I71.21 ANEURYSM OF ASCENDING AORTA WITHOUT RUPTURE: ICD-10-CM

## 2024-02-12 DIAGNOSIS — I35.0 NONRHEUMATIC AORTIC VALVE STENOSIS: ICD-10-CM

## 2024-02-12 LAB
AORTIC DIMENSIONLESS INDEX: 0.3 (DI)
ASCENDING AORTA: 3.8 CM
BH CV ECHO MEAS - ACS: 1.3 CM
BH CV ECHO MEAS - AO MAX PG: 26 MMHG
BH CV ECHO MEAS - AO MEAN PG: 13.6 MMHG
BH CV ECHO MEAS - AO V2 MAX: 255.2 CM/SEC
BH CV ECHO MEAS - AO V2 VTI: 67.8 CM
BH CV ECHO MEAS - AVA(I,D): 1.04 CM2
BH CV ECHO MEAS - EDV(CUBED): 103.8 ML
BH CV ECHO MEAS - EDV(MOD-SP2): 61 ML
BH CV ECHO MEAS - EDV(MOD-SP4): 72 ML
BH CV ECHO MEAS - EF(MOD-BP): 62.4 %
BH CV ECHO MEAS - EF(MOD-SP2): 59 %
BH CV ECHO MEAS - EF(MOD-SP4): 66.7 %
BH CV ECHO MEAS - ESV(CUBED): 22.3 ML
BH CV ECHO MEAS - ESV(MOD-SP2): 25 ML
BH CV ECHO MEAS - ESV(MOD-SP4): 24 ML
BH CV ECHO MEAS - FS: 40.1 %
BH CV ECHO MEAS - IVS/LVPW: 1.1 CM
BH CV ECHO MEAS - IVSD: 1.1 CM
BH CV ECHO MEAS - LAT PEAK E' VEL: 9.9 CM/SEC
BH CV ECHO MEAS - LV DIASTOLIC VOL/BSA (35-75): 38.2 CM2
BH CV ECHO MEAS - LV MASS(C)D: 175.8 GRAMS
BH CV ECHO MEAS - LV MAX PG: 3.3 MMHG
BH CV ECHO MEAS - LV MEAN PG: 1.86 MMHG
BH CV ECHO MEAS - LV SYSTOLIC VOL/BSA (12-30): 12.7 CM2
BH CV ECHO MEAS - LV V1 MAX: 90.8 CM/SEC
BH CV ECHO MEAS - LV V1 VTI: 23 CM
BH CV ECHO MEAS - LVIDD: 4.7 CM
BH CV ECHO MEAS - LVIDS: 2.8 CM
BH CV ECHO MEAS - LVOT AREA: 3.1 CM2
BH CV ECHO MEAS - LVOT DIAM: 1.98 CM
BH CV ECHO MEAS - LVPWD: 1 CM
BH CV ECHO MEAS - MED PEAK E' VEL: 7.6 CM/SEC
BH CV ECHO MEAS - MR MAX PG: 57.1 MMHG
BH CV ECHO MEAS - MR MAX VEL: 377.9 CM/SEC
BH CV ECHO MEAS - MV A DUR: 0.13 SEC
BH CV ECHO MEAS - MV A MAX VEL: 72.4 CM/SEC
BH CV ECHO MEAS - MV DEC SLOPE: 483.9 CM/SEC2
BH CV ECHO MEAS - MV DEC TIME: 0.17 SEC
BH CV ECHO MEAS - MV E MAX VEL: 84.4 CM/SEC
BH CV ECHO MEAS - MV E/A: 1.17
BH CV ECHO MEAS - MV MAX PG: 3.3 MMHG
BH CV ECHO MEAS - MV MEAN PG: 0.8 MMHG
BH CV ECHO MEAS - MV P1/2T: 55.6 MSEC
BH CV ECHO MEAS - MV V2 VTI: 32.3 CM
BH CV ECHO MEAS - MVA(P1/2T): 4 CM2
BH CV ECHO MEAS - MVA(VTI): 2.18 CM2
BH CV ECHO MEAS - PA ACC TIME: 0.12 SEC
BH CV ECHO MEAS - PA V2 MAX: 93.7 CM/SEC
BH CV ECHO MEAS - PULM A REVS DUR: 0.1 SEC
BH CV ECHO MEAS - PULM A REVS VEL: 24.4 CM/SEC
BH CV ECHO MEAS - PULM DIAS VEL: 42.8 CM/SEC
BH CV ECHO MEAS - PULM S/D: 1.1
BH CV ECHO MEAS - PULM SYS VEL: 47.1 CM/SEC
BH CV ECHO MEAS - QP/QS: 0.64
BH CV ECHO MEAS - RV MAX PG: 1.38 MMHG
BH CV ECHO MEAS - RV V1 MAX: 58.7 CM/SEC
BH CV ECHO MEAS - RV V1 VTI: 14.6 CM
BH CV ECHO MEAS - RVOT DIAM: 1.99 CM
BH CV ECHO MEAS - SI(MOD-SP2): 19.1 ML/M2
BH CV ECHO MEAS - SI(MOD-SP4): 25.4 ML/M2
BH CV ECHO MEAS - SV(LVOT): 70.6 ML
BH CV ECHO MEAS - SV(MOD-SP2): 36 ML
BH CV ECHO MEAS - SV(MOD-SP4): 48 ML
BH CV ECHO MEAS - SV(RVOT): 45.3 ML
BH CV ECHO MEAS - TAPSE (>1.6): 2.26 CM
BH CV ECHO MEAS - TR MAX PG: 12.8 MMHG
BH CV ECHO MEAS - TR MAX VEL: 179.2 CM/SEC
BH CV ECHO MEASUREMENTS AVERAGE E/E' RATIO: 9.65
BH CV XLRA - RV BASE: 3.6 CM
BH CV XLRA - RV LENGTH: 7.2 CM
BH CV XLRA - RV MID: 2.6 CM
BH CV XLRA - TDI S': 12.4 CM/SEC
CREAT BLDA-MCNC: 1.3 MG/DL (ref 0.6–1.3)
LEFT ATRIUM VOLUME INDEX: 24.1 ML/M2
SINUS: 3.7 CM
STJ: 3.1 CM

## 2024-02-12 PROCEDURE — C8911 MRA W/O FOL W/CONT, CHEST: HCPCS

## 2024-02-12 PROCEDURE — A9577 INJ MULTIHANCE: HCPCS | Performed by: INTERNAL MEDICINE

## 2024-02-12 PROCEDURE — 93306 TTE W/DOPPLER COMPLETE: CPT

## 2024-02-12 PROCEDURE — 0 GADOBENATE DIMEGLUMINE 529 MG/ML SOLUTION: Performed by: INTERNAL MEDICINE

## 2024-02-12 PROCEDURE — 93306 TTE W/DOPPLER COMPLETE: CPT | Performed by: INTERNAL MEDICINE

## 2024-02-12 PROCEDURE — 82565 ASSAY OF CREATININE: CPT

## 2024-02-12 RX ADMIN — GADOBENATE DIMEGLUMINE 16 ML: 529 INJECTION, SOLUTION INTRAVENOUS at 08:13

## 2024-02-16 ENCOUNTER — TELEPHONE (OUTPATIENT)
Dept: CARDIOLOGY | Facility: CLINIC | Age: 69
End: 2024-02-16
Payer: MEDICARE

## 2024-02-22 RX ORDER — ESOMEPRAZOLE MAGNESIUM 40 MG/1
40 CAPSULE, DELAYED RELEASE ORAL 2 TIMES DAILY
Qty: 180 CAPSULE | Refills: 3 | Status: SHIPPED | OUTPATIENT
Start: 2024-02-22

## 2024-02-22 RX ORDER — METOPROLOL SUCCINATE 25 MG/1
25 TABLET, EXTENDED RELEASE ORAL DAILY
Qty: 90 TABLET | Refills: 3 | Status: SHIPPED | OUTPATIENT
Start: 2024-02-22

## 2024-02-22 NOTE — TELEPHONE ENCOUNTER
Rx Refill Note  Requested Prescriptions     Pending Prescriptions Disp Refills    esomeprazole (nexIUM) 40 MG capsule [Pharmacy Med Name: ESOMEPRAZOLE MAGNESIUM 40 MG Capsule Delayed Release] 180 capsule 3     Sig: TAKE 1 CAPSULE TWICE DAILY      Last office visit with prescribing clinician: 11/6/2023   Last telemedicine visit with prescribing clinician: Visit date not found   Next office visit with prescribing clinician: 11/8/2024                         Would you like a call back once the refill request has been completed: [] Yes [] No    If the office needs to give you a call back, can they leave a voicemail: [] Yes [] No    Noemy Marcano MA  02/22/24, 09:26 EST

## 2024-02-22 NOTE — TELEPHONE ENCOUNTER
Rx Refill Note  Requested Prescriptions     Pending Prescriptions Disp Refills    metoprolol succinate XL (TOPROL-XL) 25 MG 24 hr tablet [Pharmacy Med Name: METOPROLOL SUCCINATE ER 25 MG Tablet Extended Release 24 Hour] 90 tablet 3     Sig: TAKE 1 TABLET EVERY DAY      Last office visit with prescribing clinician: 9/6/2023   Last telemedicine visit with prescribing clinician: Visit date not found   Next office visit with prescribing clinician: 3/6/2024                         Would you like a call back once the refill request has been completed: [] Yes [] No    If the office needs to give you a call back, can they leave a voicemail: [] Yes [] No    Juaquin Morgan, [facility]  02/22/24, 10:33 EST    Return in about 6 months (around 3/6/2024) for followup with me.

## 2024-03-06 ENCOUNTER — OFFICE VISIT (OUTPATIENT)
Dept: CARDIOLOGY | Facility: CLINIC | Age: 69
End: 2024-03-06
Payer: MEDICARE

## 2024-03-06 VITALS
OXYGEN SATURATION: 99 % | DIASTOLIC BLOOD PRESSURE: 64 MMHG | SYSTOLIC BLOOD PRESSURE: 112 MMHG | HEIGHT: 67 IN | RESPIRATION RATE: 16 BRPM | BODY MASS INDEX: 26.68 KG/M2 | HEART RATE: 50 BPM | WEIGHT: 170 LBS

## 2024-03-06 DIAGNOSIS — I10 HYPERTENSION, ESSENTIAL: ICD-10-CM

## 2024-03-06 DIAGNOSIS — E78.2 MIXED HYPERLIPIDEMIA: ICD-10-CM

## 2024-03-06 DIAGNOSIS — I71.21 ANEURYSM OF ASCENDING AORTA WITHOUT RUPTURE: ICD-10-CM

## 2024-03-06 DIAGNOSIS — I35.0 NONRHEUMATIC AORTIC VALVE STENOSIS: ICD-10-CM

## 2024-03-06 DIAGNOSIS — I25.10 CORONARY ARTERY DISEASE INVOLVING NATIVE CORONARY ARTERY OF NATIVE HEART WITHOUT ANGINA PECTORIS: ICD-10-CM

## 2024-03-06 DIAGNOSIS — I10 PRIMARY HYPERTENSION: Primary | ICD-10-CM

## 2024-03-06 PROCEDURE — 1159F MED LIST DOCD IN RCRD: CPT | Performed by: INTERNAL MEDICINE

## 2024-03-06 PROCEDURE — 93000 ELECTROCARDIOGRAM COMPLETE: CPT | Performed by: INTERNAL MEDICINE

## 2024-03-06 PROCEDURE — 99214 OFFICE O/P EST MOD 30 MIN: CPT | Performed by: INTERNAL MEDICINE

## 2024-03-06 PROCEDURE — 3074F SYST BP LT 130 MM HG: CPT | Performed by: INTERNAL MEDICINE

## 2024-03-06 PROCEDURE — 1160F RVW MEDS BY RX/DR IN RCRD: CPT | Performed by: INTERNAL MEDICINE

## 2024-03-06 PROCEDURE — 3078F DIAST BP <80 MM HG: CPT | Performed by: INTERNAL MEDICINE

## 2024-03-06 NOTE — PROGRESS NOTES
Date of Office Visit: 2024  Encounter Provider: Kisha Berman MD  Place of Service: [facility] CARDIOLOGY  Patient Name: Chino Huber  :1955    Chief complaint  Coronary artery disease, dilated ascending aorta, aortic valve stenosis    History of Present Illness  The patient is a pleasant, 68 year-old gentleman with history of prostate cancer status  post resection, hypertension, and hyperlipidemia who in 2014 was noted to have coronary  artery disease when he presented with chest pain and abnormal stress test. Cardiac catheterization revealed 90% stenosis of the circumflex artery and of the right coronary  artery. The left anterior descending and left main were normal. He underwent drug-coated  stent placement to both the circumflex and the right coronary artery.  He had a CT angio of his chest in May 2015 that revealed stable thoracic aneurysm measuring 4.0 cm.  In 2018 he had an echocardiogram that showed normal systolic function with mild left atrial enlargement, bicuspid aortic valve with mild stenosis with a mean gradient of 10 mmHg and mildly dilated asending aorta measuring 3.6 cm.  CT angiogram of the chest showed stable dilated a sending aorta measuring 4.1 cm at the ascending aortic level.  In 2020 MR angiography of the chest showed asending aortic aneurysm measuring 3.7 cm overall unchanged from CT study where it measured 4.1 cm.  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  He had a stress echocardiogram 2020 that showed no ischemia at a good workload.  LV systolic function was normal with grade 1 diastolic functions that was mild to moderate aortic valve stenosis with a valve area of 1.47 square to mean gradient of 23 mmHg.  RV systolic pressure was normal.  He had a limited echocardiogram a month ago that showed normal systolic and diastolic function with valve area of 1.5 cm.  Mean gradient of 15 mmHg  and a sending aorta measuring 4.1 cm.  Images were adequate.  Follow-up echocardiogram was performed on 8/9/2021 that showed normal systolic function with mild to moderate valve stenosis with mean gradient of 50 mmHg valve area 1.5 cm² ascending aorta measuring 4.1 cm.  Aortic root measuring 4 cm.  He had an MR angiogram of the chest 4/2022 that showed aortic root measuring 3.8 cm in the coronal view of the ascending aorta measuring 4.2 cm.  This is relatively unchanged by comparison to CT scan from 2015.  Follow-up in 3/2023 an echo and MRI had been ordered however neither.  However he was apparently admitted to [facility] chest pain on 3/1/2023.  Records are not available.  However patient states that he had a stress test and echo which were reportedly normal.  Ejection fraction was 62% mild to moderate left ventricular hypertrophy is present, otherwise normal diastolic function.  Bicuspid aortic valve is present with mild stenosis.  There is trivial valve regurgitation and borderline dilation that is aortic.  Echo on 2/2024 showed ejection fraction 62%, mild to moderate left atrial hypertrophy, normal diastolic function.  Bicuspid aortic valve is present with mild aortic stenosis with valve area 1.04 cm² with mean gradient of 14 mmHg there is trivial valve regurgitation.  The RVSP could not be calculated.  Ascending aorta is slightly dilated at 3.8 cm.  MR angiogram of the chest on 2/2024 shows aortic root measured 3.4 cm acing aorta measuring 3.9 seconds.  Overall unchanged from prior study dated 4/2022.    Since last visit patient's had no chest pain shortness of breath palpitations syncope near syncope.  He is not exercising.  He is walking 10-12,000 steps a day.  Blood pressure at home is slightly higher than it is today.      Past Medical History:   Diagnosis Date    Acute sinusitis     Allergic 1969    Aortic root dilatation     Aortic stenosis     Aortic valve stenosis     mild    Arteriosclerotic  coronary artery disease     Bicuspid aortic valve     CAD (coronary artery disease)     Cancer 2009    Prostate    Chest pain     Colon polyp     Cough     Dilated aortic root     ED (erectile dysfunction)     Erectile dysfunction 2009    Prostatectomy    Folliculitis     GERD (gastroesophageal reflux disease)     Heart murmur 2013    Aortic bicuspid    Heartburn     Hyperlipidemia     Hypertension     Nonspecific abnormal findings on radiological and examination of intrathoracic organs     Pneumonia 1996    Precordial pain     Reflux esophagitis     Sore throat     Thoracic aortic aneurysm without rupture     Throat pain     Urinary tract infection 1992     Past Surgical History:   Procedure Laterality Date    ABDOMINAL SURGERY  Prostate    CARDIAC CATHETERIZATION  06/2013    Stent placement    CLOSED REDUCTION FOREARM FRACTURE Right     COLONOSCOPY      COLONOSCOPY      COLONOSCOPY N/A 02/03/2017    Procedure: COLONOSCOPY TO CECUM WITH HOT SNARE POLYPECTOMY WITH NORMAL SALINE INJECTION AND  TWO RESOLUTION CLIPS;  Surgeon: Fam LILLY MD;  Location: [facility] ENDOSCOPY;  Service:     COLONOSCOPY N/A 01/31/2020    Procedure: COLONOSCOPY TO TI  WITH POLYPECTOMY (COLD SNARE);  Surgeon: Fam Rose MD;  Location: [facility] ENDOSCOPY;  Service: Gastroenterology    COLONOSCOPY N/A 3/24/2023    Procedure: COLONOSCOPY to cecum/ terminal ileum with biopsy and hot and cold snare polypectomies and resolution clip placement;  Surgeon: Fam Rose MD;  Location: [facility] ENDOSCOPY;  Service: Gastroenterology;  Laterality: N/A;  pre- hx colon polyps   post- polyps, diverticuolosis, hemorrhoids     CORONARY ANGIOPLASTY WITH STENT PLACEMENT      CORONARY STENT PLACEMENT  06/2013    ENDOSCOPY N/A 03/15/2017    Procedure: ESOPHAGOGASTRODUODENOSCOPY WITH BIOPSIES (COLD);  Surgeon: Fam LILLY MD;  Location: [facility] ENDOSCOPY;  Service:     ENDOSCOPY N/A 01/31/2020    Procedure: ESOPHAGOGASTRODUODENOSCOPY  WITH BIOPSIES;  Surgeon: Fam Rose MD;  Location:  [facility] ENDOSCOPY;  Service: Gastroenterology    ENDOSCOPY N/A 3/24/2023    Procedure: ESOPHAGOGASTRODUODENOSCOPY with bx;  Surgeon: Fam Rose MD;  Location: [facility] ENDOSCOPY;  Service: Gastroenterology;  Laterality: N/A;  pre- reflux,hx  barretts  post- small hiatal hernia, gastirts, esophagitis     LASIK Bilateral     PROSTATE SURGERY      PROSTATE SURGERY      RHINOPLASTY      RHINOPLASTY      SINUS SURGERY  1987/1988    TONSILLECTOMY  1962    UPPER GASTROINTESTINAL ENDOSCOPY  03/2017    VASECTOMY  1990     Outpatient Medications Prior to Visit   Medication Sig Dispense Refill    aspirin 81 MG chewable tablet Chew 1 tablet 2 (Two) Times a Day.      atorvastatin (LIPITOR) 80 MG tablet TAKE 1 TABLET EVERY DAY 90 tablet 3    azelastine (ASTELIN) 0.1 % nasal spray       esomeprazole (nexIUM) 40 MG capsule TAKE 1 CAPSULE TWICE DAILY 180 capsule 3    losartan-hydrochlorothiazide (HYZAAR) 50-12.5 MG per tablet TAKE 1 TABLET EVERY DAY 90 tablet 1    metoprolol succinate XL (TOPROL-XL) 25 MG 24 hr tablet TAKE 1 TABLET EVERY DAY 90 tablet 3    Chlorcyclizine-Pseudoephed (Stahist AD) 25-60 MG tablet Take 1 tablet by mouth Every 12 (Twelve) Hours As Needed (allergies).       No facility-administered medications prior to visit.       Allergies as of 03/06/2024 - Reviewed 03/06/2024   Allergen Reaction Noted    Lisinopril Cough 07/30/2019     Social History     Socioeconomic History    Marital status:    Tobacco Use    Smoking status: Never     Passive exposure: Never    Smokeless tobacco: Never    Tobacco comments:     caffeine use   Vaping Use    Vaping status: Never Used   Substance and Sexual Activity    Alcohol use: Yes     Alcohol/week: 1.0 standard drink of alcohol     Types: 1 Cans of beer per week    Drug use: No    Sexual activity: Not Currently     Partners: Female     Birth control/protection: Surgical     Comment: Vasectomy,  "Prostatectomy     Family History   Problem Relation Age of Onset    Heart disease Mother     Hypertension Mother     Colon cancer Mother          due to colon cancer    Cancer Mother         colon    Hearing loss Mother     Heart disease Father     Hypertension Father     Other Father         CABG, heart valve replacement    Coronary artery disease Father     Heart failure Father     Cancer Father         prostate    Glaucoma Father     Cancer Sister         Breast    Hypertension Sister     Colon polyps Sister         Recent polyp remival    Cancer Sister         Breast    Hypertension Sister     Malig Hyperthermia Neg Hx      Review of Systems   Constitutional: Negative for chills, fever, weight gain and weight loss.   Cardiovascular:  Negative for leg swelling.   Respiratory:  Negative for cough, snoring and wheezing.    Hematologic/Lymphatic: Negative for bleeding problem. Does not bruise/bleed easily.   Skin:  Negative for color change.   Musculoskeletal:  Negative for falls, joint pain and myalgias.   Gastrointestinal:  Positive for diarrhea. Negative for melena.   Genitourinary:  Negative for hematuria.   Neurological:  Negative for excessive daytime sleepiness.   Psychiatric/Behavioral:  Negative for depression. The patient is not nervous/anxious.         Objective:     Vitals:    24 1022   BP: 112/64   BP Location: Right arm   Patient Position: Sitting   Cuff Size: Adult   Pulse: 50   Resp: 16   SpO2: 99%   Weight: 77.1 kg (170 lb)   Height: 170.2 cm (67\")     Body mass index is 26.63 kg/m².    Vitals reviewed.   Constitutional:       Appearance: Well-developed.   Eyes:      General: No scleral icterus.        Right eye: No discharge.      Conjunctiva/sclera: Conjunctivae normal.      Pupils: Pupils are equal, round, and reactive to light.   HENT:      Head: Normocephalic.      Nose: Nose normal.   Neck:      Thyroid: No thyromegaly.      Vascular: No JVD.   Pulmonary:      Effort: Pulmonary " effort is normal. No respiratory distress.      Breath sounds: Normal breath sounds. No wheezing. No rales.   Cardiovascular:      Normal rate. Regular rhythm. Normal S1. Normal S2.       Murmurs: There is no murmur.      No gallop.    Pulses:     Intact distal pulses.      Carotid: 2+ bilaterally.     Radial: 2+ bilaterally.     Femoral: 2+ bilaterally.     Popliteal: 2+ bilaterally.     Dorsalis pedis: 2+ bilaterally.     Posterior tibial: 2+ bilaterally.  Edema:     Peripheral edema absent.   Abdominal:      General: Bowel sounds are normal. There is no distension.      Palpations: Abdomen is soft.      Tenderness: There is no abdominal tenderness. There is no rebound.   Musculoskeletal: Normal range of motion.         General: No tenderness.      Cervical back: Normal range of motion and neck supple. Skin:     General: Skin is warm and dry.      Findings: No erythema or rash.   Neurological:      Mental Status: Alert and oriented to person, place, and time.   Psychiatric:         Behavior: Behavior normal.         Thought Content: Thought content normal.         Judgment: Judgment normal.       Lab Review:   Lab Results - Last 18 Months   Lab Units 10/30/23  0803 10/06/22  0922   WBC 10*3/mm3 13.64* 9.36   RBC 10*6/mm3 5.64 5.34   HEMOGLOBIN g/dL 16.1 15.8   HEMATOCRIT % 46.7 45.3   MCV fL 82.8 84.8   MCH pg 28.5 29.6   MCHC g/dL 34.5 34.9   RDW % 13.5 13.0   PLATELETS 10*3/mm3 287 274   NEUTROPHIL % % 61.9 61.8   LYMPHOCYTE % % 27.7 27.4   MONOCYTES % % 8.9 8.1   EOSINOPHIL % % 0.6 1.8   BASOPHIL % % 0.4 0.6   NEUTROS ABS 10*3/mm3 8.43* 5.78   LYMPHS ABS 10*3/mm3 3.78* 2.56   MONOS ABS 10*3/mm3 1.22* 0.76   EOS ABS 10*3/mm3 0.08 0.17   BASOS ABS 10*3/mm3 0.06 0.06   IMM GRAN % % 0.5 0.3   IMMATURE GRANS (ABS) 10*3/mm3 0.07* 0.03       Lab Results - Last 18 Months   Lab Units 03/13/24  1305 02/12/24  0739 10/06/22  0922   GLUCOSE mg/dL 91  --  94   BUN mg/dL 16  --  18   CREATININE mg/dL 1.11 1.30 1.12   SODIUM  mmol/L 138  --  139   POTASSIUM mmol/L 4.2  --  4.5   CHLORIDE mmol/L 103  --  100   CO2 mmol/L 22.0  --  30.0*   CALCIUM mg/dL 9.5  --  9.6   ALBUMIN g/dL  --   --  4.60   ALT (SGPT) U/L  --   --  27   AST (SGOT) U/L  --   --  22   ALK PHOS U/L  --   --  101   BILIRUBIN mg/dL  --   --  0.5   BUN / CREAT RATIO  14.4  --  16.1   ANION GAP mmol/L 13.0  --   --    EGFR mL/min/1.73 72.3  --   --      Lab Results - Last 18 Months   Lab Units 10/30/23  0803 10/06/22  0922   TRIGLYCERIDES mg/dL 98 122   HDL CHOL mg/dL 49 41   LDL CHOL mg/dL 54 62   VLDL CHOLESTEROL MT mg/dL 18 22         ECG 12 Lead    Date/Time: 3/6/2024 11:10 PM  Performed by: Kisha Berman MD    Authorized by: Kisha Berman MD  Comparison: compared with previous ECG   Similar to previous ECG  Rhythm: sinus bradycardia  QRS axis: right  Other findings: non-specific ST-T wave changes    Clinical impression: abnormal EKG        Assessment:       Diagnosis Plan   1. Primary hypertension  Basic Metabolic Panel    ECG 12 Lead      2. Aneurysm of ascending aorta without rupture  ECG 12 Lead      3. Nonrheumatic aortic valve stenosis  ECG 12 Lead      4. Hypertension, essential        5. Mixed hyperlipidemia        6. Coronary artery disease involving native coronary artery of native heart without angina pectoris          Plan:       1.  Coronary artery disease with history of prior stenting 2014 and negative stress echo 8/2020.  Admitted to [facility] 3/2023 with chest pain.  Had an echo and pharmacologic stress test but results are unavailable.  He reports these were normal.  No anginal symptoms.  2.  Mildly dilated ascending aorta, stable on CT in September 2018 and by MRI in February 2010.  Stable MR angiogram demonstrating ascending aorta measuring 3.9 cm on 2/2024  3.  Mild to moderate aortic valve stenosis with a bicuspid aortic vavle.  Stable by echo 2/2024  4.  Hyperlipidemia.  No labs this year  5.  Hypertension.  Controlled will watch for  hypotension in the upcoming months  6.  Chest pain attributed to esophagitis.  Noted plans for repeat EGD and colonoscopy in 3 years  7.  Bradycardia, chronic and asymptomatic.    Time Spent: I spent 35 minutes caring for Chino on this date of service. This time includes time spent by me in the following activities: preparing for the visit, reviewing tests, obtaining and/or reviewing a separately obtained history, performing a medically appropriate examination and/or evaluation, counseling and educating the patient/family/caregiver, ordering medications, tests, or procedures, documenting information in the medical record, and independently interpreting results and communicating that information with the patient/family/caregiver.   I spent 1 minutes on the separately reported service of ECG. This time is not included in the time used to support the E/M service also reported today.        Your medication list            Accurate as of March 6, 2024 11:59 PM. If you have any questions, ask your nurse or doctor.                CONTINUE taking these medications        Instructions Last Dose Given Next Dose Due   aspirin 81 MG chewable tablet      Chew 1 tablet 2 (Two) Times a Day.       atorvastatin 80 MG tablet  Commonly known as: LIPITOR      TAKE 1 TABLET EVERY DAY       azelastine 0.1 % nasal spray  Commonly known as: ASTELIN           esomeprazole 40 MG capsule  Commonly known as: nexIUM      TAKE 1 CAPSULE TWICE DAILY       losartan-hydrochlorothiazide 50-12.5 MG per tablet  Commonly known as: HYZAAR      TAKE 1 TABLET EVERY DAY       metoprolol succinate XL 25 MG 24 hr tablet  Commonly known as: TOPROL-XL      TAKE 1 TABLET EVERY DAY                Patient is no longer taking -.  I corrected the med list to reflect this.  I did not stop these medications.      Dictated utilizing Dragon dictation

## 2024-03-13 ENCOUNTER — LAB (OUTPATIENT)
Dept: LAB | Facility: HOSPITAL | Age: 69
End: 2024-03-13
Payer: MEDICARE

## 2024-03-13 PROCEDURE — 80048 BASIC METABOLIC PNL TOTAL CA: CPT | Performed by: INTERNAL MEDICINE

## 2024-03-14 ENCOUNTER — TELEPHONE (OUTPATIENT)
Dept: CARDIOLOGY | Facility: CLINIC | Age: 69
End: 2024-03-14
Payer: MEDICARE

## 2024-03-14 NOTE — TELEPHONE ENCOUNTER
Very similar to previous, but abnormal due to the slower heart rate, which Dr. Berman discussed with him at his appointment.

## 2024-03-14 NOTE — TELEPHONE ENCOUNTER
Please let him know that kidney function and electrolytes were normal on most recent labs.  Continue current medications and keep currently scheduled follow-up appointment

## 2024-03-14 NOTE — TELEPHONE ENCOUNTER
"Pt called back in.  Results and recommendations reviewed with pt.  Instructed to call with any further questions or concerns.  Verbalized understanding.    Noemi- pt saw EKG on [facility] and asked about the \"abnormal\" read.  I explained to him that if you were concerned about it, you would have talked to him about it at his appointment, but that I would certainly ask the question for him    Halina Harding, RN  [place] Nurse[place] [facility] [place]  03/14/24 08:59 EDT    "

## 2024-03-14 NOTE — TELEPHONE ENCOUNTER
Called and left VM, will continue to try to reach pt.    HUB- please put patient straight through to triage    Halina Harding, RN  Triage RN  03/14/24 08:49 EDT

## 2024-03-14 NOTE — TELEPHONE ENCOUNTER
Called and left VM, will continue to try to reach pt.    HUB- please put patient straight through to triage    Halina Harding, RN  Triage RN  03/14/24 09:46 EDT

## 2024-03-15 NOTE — TELEPHONE ENCOUNTER
Notified pt of information from Noemi. Pt verbalized understanding.    Thank you,    Akilah Hernandez, RN  Triage [facility] [place]  03/15/24 11:36 EDT

## 2024-04-17 RX ORDER — LOSARTAN POTASSIUM AND HYDROCHLOROTHIAZIDE 12.5; 5 MG/1; MG/1
TABLET ORAL
Qty: 90 TABLET | Refills: 1 | Status: SHIPPED | OUTPATIENT
Start: 2024-04-17

## 2024-08-15 ENCOUNTER — OFFICE VISIT (OUTPATIENT)
Dept: INTERNAL MEDICINE | Facility: CLINIC | Age: 69
End: 2024-08-15
Payer: MEDICARE

## 2024-08-15 VITALS
BODY MASS INDEX: 26.81 KG/M2 | DIASTOLIC BLOOD PRESSURE: 78 MMHG | OXYGEN SATURATION: 97 % | HEART RATE: 79 BPM | HEIGHT: 67 IN | SYSTOLIC BLOOD PRESSURE: 130 MMHG | TEMPERATURE: 98 F | WEIGHT: 170.8 LBS

## 2024-08-15 DIAGNOSIS — S39.012A ACUTE MYOFASCIAL STRAIN OF LUMBAR REGION, INITIAL ENCOUNTER: Primary | ICD-10-CM

## 2024-08-15 RX ORDER — MELOXICAM 7.5 MG/1
7.5 TABLET ORAL DAILY PRN
Qty: 30 TABLET | Refills: 0 | Status: SHIPPED | OUTPATIENT
Start: 2024-08-15

## 2024-08-15 RX ORDER — PREDNISONE 10 MG/1
20 TABLET ORAL DAILY
Qty: 10 TABLET | Refills: 0 | Status: SHIPPED | OUTPATIENT
Start: 2024-08-15 | End: 2024-08-20

## 2024-08-15 RX ORDER — CYCLOBENZAPRINE HCL 5 MG
5 TABLET ORAL 3 TIMES DAILY PRN
Qty: 30 TABLET | Refills: 0 | Status: SHIPPED | OUTPATIENT
Start: 2024-08-15

## 2024-08-15 NOTE — PROGRESS NOTES
Subjective   Chino Huber is a 68 y.o. male presenting today for follow up of   Chief Complaint   Patient presents with    Back Pain     Mid lower back pain since Monday. Bent and twisted and back has occurred since. Some relief with tylenol and heat.        History of Present Illness     This is a 67 yo patient who sees Laura.  3 days he was cleaning under his airplane and was stooping.  He felt a pull and immediate pain in his low back.  He has some pain into the right buttock.  Denies numbness and tingling in the legs or genitalia.  Denies leg weakness and bowel/bladder changes.  He has tried Tylenol and heat.  He has had similar injury in the past, less severe.      Patient Active Problem List   Diagnosis    Coronary artery disease involving native coronary artery of native heart without angina pectoris    GERD without esophagitis    Hyperlipidemia    Hypertension, essential    Aortic root dilatation    Nonrheumatic aortic valve stenosis    Bicuspid aortic valve    Thoracic aortic aneurysm without rupture    Screening for prostate cancer    URI with cough and congestion    Environmental allergies    Cough due to ACE inhibitor    Globus sensation    Dysphagia    Adenomatous polyp of colon    FH: colon cancer in first degree relative <60 years old    History of adenocarcinoma of prostate    Chawla's esophagus without dysplasia    Altitude sickness, subsequent encounter    COVID-19       Current Outpatient Medications on File Prior to Visit   Medication Sig    aspirin 81 MG chewable tablet Chew 1 tablet 2 (Two) Times a Day.    atorvastatin (LIPITOR) 80 MG tablet TAKE 1 TABLET EVERY DAY    azelastine (ASTELIN) 0.1 % nasal spray     esomeprazole (nexIUM) 40 MG capsule TAKE 1 CAPSULE TWICE DAILY    losartan-hydrochlorothiazide (HYZAAR) 50-12.5 MG per tablet TAKE 1 TABLET EVERY DAY    metoprolol succinate XL (TOPROL-XL) 25 MG 24 hr tablet TAKE 1 TABLET EVERY DAY     No current facility-administered medications on file  "prior to visit.          The following portions of the patient's history were reviewed and updated as appropriate: allergies, current medications, past family history, past medical history, past social history, past surgical history and problem list.    Review of Systems   Constitutional: Negative.    Gastrointestinal:  Negative for constipation.   Genitourinary:  Negative for difficulty urinating and urinary incontinence.   Musculoskeletal:  Positive for back pain.   Neurological:  Negative for weakness and numbness.   Psychiatric/Behavioral:  Positive for dysphoric mood.        Objective   Vitals:    08/15/24 1531   BP: 130/78   BP Location: Left arm   Patient Position: Standing   Cuff Size: Adult   Pulse: 79   Temp: 98 °F (36.7 °C)   TempSrc: Infrared   SpO2: 97%   Weight: 77.5 kg (170 lb 12.8 oz)   Height: 170.2 cm (67\")       BP Readings from Last 3 Encounters:   08/15/24 130/78   03/06/24 112/64   02/12/24 128/66        Wt Readings from Last 3 Encounters:   08/15/24 77.5 kg (170 lb 12.8 oz)   03/06/24 77.1 kg (170 lb)   02/12/24 77.1 kg (170 lb)        Body mass index is 26.75 kg/m².  Nursing notes and vitals reviewed.    Physical Exam  Vitals and nursing note reviewed.   Constitutional:       Appearance: Normal appearance.   HENT:      Head: Normocephalic and atraumatic.      Mouth/Throat:      Mouth: Mucous membranes are moist.   Eyes:      Extraocular Movements: Extraocular movements intact.      Conjunctiva/sclera: Conjunctivae normal.   Pulmonary:      Effort: Pulmonary effort is normal. No respiratory distress.   Musculoskeletal:      Cervical back: Neck supple. No rigidity.      Lumbar back: Spasms and tenderness present. No swelling, deformity or bony tenderness. Decreased range of motion (flexion). Negative right straight leg raise test and negative left straight leg raise test.        Back:    Skin:     General: Skin is warm and dry.   Neurological:      General: No focal deficit present.      Mental " "Status: He is alert and oriented to person, place, and time.      Sensory: Sensation is intact. No sensory deficit.      Motor: Motor function is intact. No weakness.      Coordination: Coordination normal.      Deep Tendon Reflexes: Reflexes normal.      Reflex Scores:       Patellar reflexes are 1+ on the right side and 1+ on the left side.       Achilles reflexes are 1+ on the right side and 1+ on the left side.  Psychiatric:         Mood and Affect: Mood normal.         Behavior: Behavior normal.         No results found for this or any previous visit (from the past 672 hour(s)).      Assessment & Plan   Diagnoses and all orders for this visit:    1. Acute myofascial strain of lumbar region, initial encounter (Primary)  -     predniSONE (DELTASONE) 10 MG tablet; Take 2 tablets by mouth Daily for 5 days.  Dispense: 10 tablet; Refill: 0  -     cyclobenzaprine (FLEXERIL) 5 MG tablet; Take 1 tablet by mouth 3 (Three) Times a Day As Needed for Muscle Spasms.  Dispense: 30 tablet; Refill: 0  -     meloxicam (Mobic) 7.5 MG tablet; Take 1 tablet by mouth Daily As Needed for Moderate Pain or Mild Pain.  Dispense: 30 tablet; Refill: 0        No \"red flag\" s/sxs.  Treat with 5 day course of prednisone, then start prn meloxicam.  Cyclobenzaprine prn.  Declines physical therapy at this point.  Warning s/sxs discussed.      Medications, including side effects, were discussed with the patient. Patient verbalized understanding.  The plan of care was discussed. All questions were answered. Patient verbalized understanding.      Return if symptoms worsen or fail to improve, for Next scheduled follow up.              "

## 2024-09-10 ENCOUNTER — OFFICE VISIT (OUTPATIENT)
Dept: CARDIOLOGY | Facility: CLINIC | Age: 69
End: 2024-09-10
Payer: MEDICARE

## 2024-09-10 VITALS
SYSTOLIC BLOOD PRESSURE: 140 MMHG | BODY MASS INDEX: 26.93 KG/M2 | HEIGHT: 67 IN | DIASTOLIC BLOOD PRESSURE: 80 MMHG | HEART RATE: 55 BPM | WEIGHT: 171.6 LBS

## 2024-09-10 DIAGNOSIS — I10 PRIMARY HYPERTENSION: ICD-10-CM

## 2024-09-10 DIAGNOSIS — E78.2 MIXED HYPERLIPIDEMIA: ICD-10-CM

## 2024-09-10 DIAGNOSIS — I10 HYPERTENSION, ESSENTIAL: ICD-10-CM

## 2024-09-10 DIAGNOSIS — I71.21 ANEURYSM OF ASCENDING AORTA WITHOUT RUPTURE: Primary | ICD-10-CM

## 2024-09-10 DIAGNOSIS — I35.0 NONRHEUMATIC AORTIC VALVE STENOSIS: ICD-10-CM

## 2024-09-10 DIAGNOSIS — I25.10 CORONARY ARTERY DISEASE INVOLVING NATIVE CORONARY ARTERY OF NATIVE HEART WITHOUT ANGINA PECTORIS: ICD-10-CM

## 2024-09-10 PROCEDURE — 3079F DIAST BP 80-89 MM HG: CPT | Performed by: NURSE PRACTITIONER

## 2024-09-10 PROCEDURE — 99214 OFFICE O/P EST MOD 30 MIN: CPT | Performed by: NURSE PRACTITIONER

## 2024-09-10 PROCEDURE — 3077F SYST BP >= 140 MM HG: CPT | Performed by: NURSE PRACTITIONER

## 2024-09-10 PROCEDURE — 93000 ELECTROCARDIOGRAM COMPLETE: CPT | Performed by: NURSE PRACTITIONER

## 2024-09-10 NOTE — PROGRESS NOTES
Date of Office Visit: 09/10/2024  Encounter Provider: GARY Trujillo  Place of Service: [facility] CARDIOLOGY  Patient Name: Chino Huber  :1955    Chief complaint  Coronary artery disease, dilated ascending aorta, aortic valve stenosis     History of Present Illness  Patient is a 68 y.o. year old male  patient of Dr. Berman. Past medical history includes prostate cancer status  post resection, hypertension, and hyperlipidemia who in 2014 was noted to have coronary  artery disease when he presented with chest pain and abnormal stress test. Cardiac catheterization revealed 90% stenosis of the circumflex artery and of the right coronary  artery. The left anterior descending and left main were normal. He underwent drug-coated  stent placement to both the circumflex and the right coronary artery.  He had a CT angio of his chest in May 2015 that revealed stable thoracic aneurysm measuring 4.0 cm.  In 2018 he had an echocardiogram that showed normal systolic function with mild left atrial enlargement, bicuspid aortic valve with mild stenosis with a mean gradient of 10 mmHg and mildly dilated asending aorta measuring 3.6 cm.  CT angiogram of the chest showed stable dilated a sending aorta measuring 4.1 cm at the ascending aortic level.  In 2020 MR angiography of the chest showed asending aortic aneurysm measuring 3.7 cm overall unchanged from CT study where it measured 4.1 cm.  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  He had a stress echocardiogram 2020 that showed no ischemia at a good workload.  LV systolic function was normal with grade 1 diastolic functions that was mild to moderate aortic valve stenosis with a valve area of 1.47 square to mean gradient of 23 mmHg.  RV systolic pressure was normal.  He had a limited echocardiogram a month ago that showed normal systolic and diastolic function with valve area of 1.5  cm.  Mean gradient of 15 mmHg and a sending aorta measuring 4.1 cm.  Images were adequate.  Follow-up echocardiogram was performed on 8/9/2021 that showed normal systolic function with mild to moderate valve stenosis with mean gradient of 50 mmHg valve area 1.5 cm² ascending aorta measuring 4.1 cm.  Aortic root measuring 4 cm.  He had an MR angiogram of the chest 4/2022 that showed aortic root measuring 3.8 cm in the coronal view of the ascending aorta measuring 4.2 cm.  This is relatively unchanged by comparison to CT scan from 2015.  Follow-up in 3/2023 an echo and MRI had been ordered however neither.  However he was apparently admitted to [facility] chest pain on 3/1/2023.  Records are not available.  However patient states that he had a stress test and echo which were reportedly normal.  Ejection fraction was 62% mild to moderate left ventricular hypertrophy is present, otherwise normal diastolic function.  Bicuspid aortic valve is present with mild stenosis.  There is trivial valve regurgitation and borderline dilation that is aortic.  Echo on 2/2024 showed ejection fraction 62%, mild to moderate left atrial hypertrophy, normal diastolic function.  Bicuspid aortic valve is present with mild aortic stenosis with valve area 1.04 cm² with mean gradient of 14 mmHg there is trivial valve regurgitation.  The RVSP could not be calculated.  Ascending aorta is slightly dilated at 3.8 cm.  MR angiogram of the chest on 2/2024 shows aortic root measured 3.4 cm acing aorta measuring 3.9 seconds.  Overall unchanged from prior study dated 4/2022.     Interval history  Patient presents today for routine follow-up.  I will visit with him for the first time today and have reviewed his medical record.  Since last visit he has been doing well.  He denies palpitations, shortness of breath, edema, dizziness, chest pain or chest pressure, fatigue, syncope or presyncope.  Blood pressure in office today is elevated though he  reports blood pressure at home is usually around 122-128/70.  He is active and denies exertional symptoms though he does not routinely exercise.    Past Medical History:   Diagnosis Date    Acute sinusitis     Allergic 1969    Aortic root dilatation     Aortic stenosis     Aortic valve stenosis     mild    Arteriosclerotic coronary artery disease     Bicuspid aortic valve     CAD (coronary artery disease)     Cancer 2009    Prostate    Chest pain     Colon polyp     Cough     Dilated aortic root     ED (erectile dysfunction)     Erectile dysfunction 2009    Prostatectomy    Folliculitis     GERD (gastroesophageal reflux disease)     Heart murmur 2013    Aortic bicuspid    Heartburn     Hyperlipidemia     Hypertension     Nonspecific abnormal findings on radiological and examination of intrathoracic organs     Pneumonia 1996    Precordial pain     Reflux esophagitis     Sore throat     Thoracic aortic aneurysm without rupture     Throat pain     Urinary tract infection 1992     Past Surgical History:   Procedure Laterality Date    ABDOMINAL SURGERY  Prostate    CARDIAC CATHETERIZATION  06/2013    Stent placement    CLOSED REDUCTION FOREARM FRACTURE Right     COLONOSCOPY      COLONOSCOPY      COLONOSCOPY N/A 02/03/2017    Procedure: COLONOSCOPY TO CECUM WITH HOT SNARE POLYPECTOMY WITH NORMAL SALINE INJECTION AND  TWO RESOLUTION CLIPS;  Surgeon: Fam LILLY MD;  Location: [facility] ENDOSCOPY;  Service:     COLONOSCOPY N/A 01/31/2020    Procedure: COLONOSCOPY TO TI  WITH POLYPECTOMY (COLD SNARE);  Surgeon: Fam Rose MD;  Location: [facility] ENDOSCOPY;  Service: Gastroenterology    COLONOSCOPY N/A 3/24/2023    Procedure: COLONOSCOPY to cecum/ terminal ileum with biopsy and hot and cold snare polypectomies and resolution clip placement;  Surgeon: Fam Rose MD;  Location: [facility] ENDOSCOPY;  Service: Gastroenterology;  Laterality: N/A;  pre- hx colon polyps   post- polyps, diverticuolosis,  hemorrhoids     CORONARY ANGIOPLASTY WITH STENT PLACEMENT      CORONARY STENT PLACEMENT  06/2013    ENDOSCOPY N/A 03/15/2017    Procedure: ESOPHAGOGASTRODUODENOSCOPY WITH BIOPSIES (COLD);  Surgeon: Fam LILLY MD;  Location: [facility] ENDOSCOPY;  Service:     ENDOSCOPY N/A 01/31/2020    Procedure: ESOPHAGOGASTRODUODENOSCOPY WITH BIOPSIES;  Surgeon: Fam Rose MD;  Location: [facility] ENDOSCOPY;  Service: Gastroenterology    ENDOSCOPY N/A 3/24/2023    Procedure: ESOPHAGOGASTRODUODENOSCOPY with bx;  Surgeon: Fam Rose MD;  Location: [facility] ENDOSCOPY;  Service: Gastroenterology;  Laterality: N/A;  pre- reflux,hx  barretts  post- small hiatal hernia, gastirts, esophagitis     LASIK Bilateral     PROSTATE SURGERY      PROSTATE SURGERY      RHINOPLASTY      RHINOPLASTY      SINUS SURGERY  1987/1988    TONSILLECTOMY  1962    UPPER GASTROINTESTINAL ENDOSCOPY  03/2017    VASECTOMY  1990     Outpatient Medications Prior to Visit   Medication Sig Dispense Refill    aspirin 81 MG chewable tablet Chew 1 tablet 2 (Two) Times a Day.      atorvastatin (LIPITOR) 80 MG tablet TAKE 1 TABLET EVERY DAY 90 tablet 3    azelastine (ASTELIN) 0.1 % nasal spray       esomeprazole (nexIUM) 40 MG capsule TAKE 1 CAPSULE TWICE DAILY 180 capsule 3    losartan-hydrochlorothiazide (HYZAAR) 50-12.5 MG per tablet TAKE 1 TABLET EVERY DAY 90 tablet 1    metoprolol succinate XL (TOPROL-XL) 25 MG 24 hr tablet TAKE 1 TABLET EVERY DAY 90 tablet 3    cyclobenzaprine (FLEXERIL) 5 MG tablet Take 1 tablet by mouth 3 (Three) Times a Day As Needed for Muscle Spasms. 30 tablet 0    meloxicam (Mobic) 7.5 MG tablet Take 1 tablet by mouth Daily As Needed for Moderate Pain or Mild Pain. 30 tablet 0     No facility-administered medications prior to visit.       Allergies as of 09/10/2024 - Reviewed 09/10/2024   Allergen Reaction Noted    Lisinopril Cough 07/30/2019     Social History     Socioeconomic History    Marital status:   "  Tobacco Use    Smoking status: Never     Passive exposure: Never    Smokeless tobacco: Never    Tobacco comments:     caffeine use   Vaping Use    Vaping status: Never Used   Substance and Sexual Activity    Alcohol use: Not Currently     Alcohol/week: 1.0 standard drink of alcohol    Drug use: No    Sexual activity: Not Currently     Partners: Female     Birth control/protection: Surgical     Comment: Vasectomy, Prostatectomy     Family History   Problem Relation Age of Onset    Heart disease Mother     Hypertension Mother     Colon cancer Mother          due to colon cancer    Cancer Mother         colon    Hearing loss Mother     Heart disease Father     Hypertension Father     Coronary artery disease Father     Heart failure Father     Cancer Father         prostate    Glaucoma Father     Cancer Sister         Breast    Hypertension Sister     Colon polyps Sister         Recent polyp remival    Cancer Sister         Breast    Hypertension Sister     Malig Hyperthermia Neg Hx      Review of Systems   Constitutional: Negative for malaise/fatigue.   Cardiovascular:  Negative for chest pain, claudication, dyspnea on exertion, leg swelling, near-syncope, orthopnea, palpitations, paroxysmal nocturnal dyspnea and syncope.   Respiratory:  Negative for shortness of breath.    Neurological:  Negative for brief paralysis, dizziness, headaches and light-headedness.   All other systems reviewed and are negative.       Objective:     Vitals:    09/10/24 0925   BP: 140/80   BP Location: Left arm   Patient Position: Sitting   Cuff Size: Adult   Pulse: 55   Weight: 77.8 kg (171 lb 9.6 oz)   Height: 170.2 cm (67\")     Body mass index is 26.88 kg/m².    Vitals reviewed.   Constitutional:       General: Not in acute distress.     Appearance: Well-developed and not in distress. Not diaphoretic.   HENT:      Head: Normocephalic.   Pulmonary:      Effort: Pulmonary effort is normal. No respiratory distress.      Breath sounds: " Normal breath sounds. No wheezing. No rhonchi. No rales.   Cardiovascular:      Normal rate. Regular rhythm.      Murmurs: There is a grade 1/6 systolic murmur.   Pulses:     Radial: 2+ bilaterally.  Edema:     Peripheral edema absent.   Skin:     General: Skin is warm and dry. There is no cyanosis.      Findings: No rash.   Neurological:      Mental Status: Alert and oriented to person, place, and time.   Psychiatric:         Behavior: Behavior normal. Behavior is cooperative.         Thought Content: Thought content normal.         Judgment: Judgment normal.       Lab Review:     Lab Results   Component Value Date     03/13/2024     10/06/2022    K 4.2 03/13/2024    K 4.5 10/06/2022     03/13/2024     10/06/2022    CO2 22.0 03/13/2024    CO2 30.0 (H) 10/06/2022    BUN 16 03/13/2024    BUN 18 10/06/2022    CREATININE 1.11 03/13/2024    CREATININE 1.30 02/12/2024    EGFRIFNONA 66 10/14/2021    EGFRIFNONA 63 05/13/2020    EGFRIFAFRI 80 10/14/2021    EGFRIFAFRI 77 05/13/2020    GLUCOSE 91 03/13/2024    GLUCOSE 94 10/06/2022    CALCIUM 9.5 03/13/2024    CALCIUM 9.6 10/06/2022    PROTENTOTREF 5.9 (L) 10/06/2022    PROTENTOTREF 6.1 10/14/2021    ALBUMIN 4.60 10/06/2022    ALBUMIN 4.40 10/14/2021    BILITOT 0.5 10/06/2022    BILITOT 0.5 10/14/2021    AST 22 10/06/2022    AST 19 10/14/2021    ALT 27 10/06/2022    ALT 31 10/14/2021     Lab Results   Component Value Date    WBC 13.64 (H) 10/30/2023    WBC 9.36 10/06/2022    HGB 16.1 10/30/2023    HGB 15.8 10/06/2022    HCT 46.7 10/30/2023    HCT 45.3 10/06/2022    MCV 82.8 10/30/2023    MCV 84.8 10/06/2022     10/30/2023     10/06/2022     Lab Results   Component Value Date    PROBNP 31.1 08/31/2018     Lab Results   Component Value Date    TROPONINT <0.010 09/01/2018     Lab Results   Component Value Date    TSH 2.290 10/14/2021    TSH 3.940 05/13/2020      Lipid Panel          10/30/2023    08:03   Lipid Panel   Total Cholesterol 121     Triglycerides 98    HDL Cholesterol 49    VLDL Cholesterol 18    LDL Cholesterol  54           ECG 12 Lead    Date/Time: 9/10/2024 1:14 PM  Performed by: Noemi Erickson APRN    Authorized by: Noemi Erickson APRN  Comparison: compared with previous ECG   Similar to previous ECG  Rhythm: sinus rhythm  Rate: normal  BPM: 55  QRS axis: normal  Comments: Similar to prior        Assessment:       Diagnosis Plan   1. Aneurysm of ascending aorta without rupture  MRI Angiogram Chest With & Without Contrast      2. Primary hypertension  MRI Angiogram Chest With & Without Contrast      3. Nonrheumatic aortic valve stenosis  MRI Angiogram Chest With & Without Contrast      4. Hypertension, essential  MRI Angiogram Chest With & Without Contrast      5. Mixed hyperlipidemia  MRI Angiogram Chest With & Without Contrast      6. Coronary artery disease involving native coronary artery of native heart without angina pectoris  MRI Angiogram Chest With & Without Contrast        Plan:       1.  Coronary artery disease with history of prior stenting 2014 and negative stress echo 8/2020.  Admitted to [facility] 3/2023 with chest pain after a trip to [place].  Had an echo and pharmacologic stress test but results are unavailable.  He reports these were normal. He currently denies anginal symptoms. Continue risk factor modification.  2.  Mildly dilated ascending aorta, stable on CT in September 2018 and by MRI in February 2010.  Stable MR angiogram demonstrating ascending aorta measuring 3.9 cm on 2/2024. Plan on repeat MR angiogram in early 2025.  3.  Mild to moderate aortic valve stenosis with a bicuspid aortic vavle.  Stable by echo 2/2024  4.  Hyperlipidemia.  Lipids overall at goal with HDL slightly low in 10/2023. He plans on repeat labs with PCP in the fall.  5.  Hypertension.  Controlled at home. He will continue to monitor and call for BP consistently >130/80 or <110/50.  6.  Chest pain attributed to esophagitis.   Noted plans for repeat EGD and colonoscopy in 3 years  7.  Bradycardia, chronic and asymptomatic.      Time Spent: I spent 30 minutes caring for Chino on this date of service. This time includes time spent by me in the following activities: preparing for the visit, reviewing tests, performing a medically appropriate examination and/or evaluations, counseling and educating the patient/family/caregiver, ordering medications, tests, or procedures, documenting information in the medical record, and independently interpreting results and communicating that information with the patient/family/caregiver.   I spent 1 minutes on the separately reported service of ECG. This time is not included in the time used to support the E/M service also reported today.        Your medication list            Accurate as of September 10, 2024  1:15 PM. If you have any questions, ask your nurse or doctor.                CONTINUE taking these medications        Instructions Last Dose Given Next Dose Due   aspirin 81 MG chewable tablet      Chew 1 tablet 2 (Two) Times a Day.       atorvastatin 80 MG tablet  Commonly known as: LIPITOR      TAKE 1 TABLET EVERY DAY       azelastine 0.1 % nasal spray  Commonly known as: ASTELIN           esomeprazole 40 MG capsule  Commonly known as: nexIUM      TAKE 1 CAPSULE TWICE DAILY       losartan-hydrochlorothiazide 50-12.5 MG per tablet  Commonly known as: HYZAAR      TAKE 1 TABLET EVERY DAY       metoprolol succinate XL 25 MG 24 hr tablet  Commonly known as: TOPROL-XL      TAKE 1 TABLET EVERY DAY                Patient is no longer taking -.  I corrected the med list to reflect this.  I did not stop these medications.    Return in about 6 months (around 3/10/2025) for with Dr. Berman.      Dictated utilizing Dragon dictation

## 2024-10-28 RX ORDER — LOSARTAN POTASSIUM AND HYDROCHLOROTHIAZIDE 12.5; 5 MG/1; MG/1
TABLET ORAL
Qty: 90 TABLET | Refills: 3 | Status: SHIPPED | OUTPATIENT
Start: 2024-10-28

## 2024-11-13 ENCOUNTER — OFFICE VISIT (OUTPATIENT)
Dept: INTERNAL MEDICINE | Facility: CLINIC | Age: 69
End: 2024-11-13
Payer: MEDICARE

## 2024-11-13 VITALS
TEMPERATURE: 97.3 F | OXYGEN SATURATION: 98 % | HEART RATE: 75 BPM | BODY MASS INDEX: 26.63 KG/M2 | SYSTOLIC BLOOD PRESSURE: 140 MMHG | WEIGHT: 170 LBS | DIASTOLIC BLOOD PRESSURE: 74 MMHG

## 2024-11-13 DIAGNOSIS — Z23 NEED FOR VACCINATION FOR PNEUMOCOCCUS: ICD-10-CM

## 2024-11-13 DIAGNOSIS — J30.1 NON-SEASONAL ALLERGIC RHINITIS DUE TO POLLEN: ICD-10-CM

## 2024-11-13 DIAGNOSIS — E78.2 MIXED HYPERLIPIDEMIA: ICD-10-CM

## 2024-11-13 DIAGNOSIS — K22.70 BARRETT'S ESOPHAGUS WITHOUT DYSPLASIA: ICD-10-CM

## 2024-11-13 DIAGNOSIS — Z12.5 SCREENING FOR MALIGNANT NEOPLASM OF PROSTATE: ICD-10-CM

## 2024-11-13 DIAGNOSIS — I25.10 CORONARY ARTERY DISEASE INVOLVING NATIVE CORONARY ARTERY OF NATIVE HEART WITHOUT ANGINA PECTORIS: ICD-10-CM

## 2024-11-13 DIAGNOSIS — Z00.00 ENCOUNTER FOR ANNUAL WELLNESS VISIT (AWV) IN MEDICARE PATIENT: Primary | ICD-10-CM

## 2024-11-13 DIAGNOSIS — I10 HYPERTENSION, ESSENTIAL: ICD-10-CM

## 2024-11-13 PROBLEM — U07.1 COVID-19: Status: RESOLVED | Noted: 2023-11-06 | Resolved: 2024-11-13

## 2024-11-13 PROBLEM — J06.9 URI WITH COUGH AND CONGESTION: Status: RESOLVED | Noted: 2019-07-15 | Resolved: 2024-11-13

## 2024-11-13 PROCEDURE — 3077F SYST BP >= 140 MM HG: CPT | Performed by: NURSE PRACTITIONER

## 2024-11-13 PROCEDURE — G0439 PPPS, SUBSEQ VISIT: HCPCS | Performed by: NURSE PRACTITIONER

## 2024-11-13 PROCEDURE — 3078F DIAST BP <80 MM HG: CPT | Performed by: NURSE PRACTITIONER

## 2024-11-13 PROCEDURE — 99214 OFFICE O/P EST MOD 30 MIN: CPT | Performed by: NURSE PRACTITIONER

## 2024-11-13 PROCEDURE — 90677 PCV20 VACCINE IM: CPT | Performed by: NURSE PRACTITIONER

## 2024-11-13 PROCEDURE — 1126F AMNT PAIN NOTED NONE PRSNT: CPT | Performed by: NURSE PRACTITIONER

## 2024-11-13 PROCEDURE — G0009 ADMIN PNEUMOCOCCAL VACCINE: HCPCS | Performed by: NURSE PRACTITIONER

## 2024-11-13 RX ORDER — AZELASTINE 1 MG/ML
2 SPRAY, METERED NASAL 2 TIMES DAILY
Qty: 90 ML | Refills: 3 | Status: SHIPPED | OUTPATIENT
Start: 2024-11-13

## 2024-11-13 NOTE — ASSESSMENT & PLAN NOTE
Orders:    Comprehensive Metabolic Panel; Future    Lipid Panel With / Chol / HDL Ratio; Future

## 2024-11-13 NOTE — PROGRESS NOTES
Subjective   The ABCs of the Annual Wellness Visit  Medicare Wellness Visit      Chino Huber is a 69 y.o. patient who presents for a Medicare Wellness Visit.    The following portions of the patient's history were reviewed and   updated as appropriate: allergies, current medications, past family history, past medical history, past social history, past surgical history, and problem list.    Compared to one year ago, the patient's physical   health is the same.  Compared to one year ago, the patient's mental   health is the same.    Recent Hospitalizations:  He was not admitted to the hospital during the last year.     Current Medical Providers:  Patient Care Team:  Laura Barron APRN as PCP - General (Family Medicine)  Kisha Berman MD as Consulting Physician (Cardiology)  Michael Martinez Jr., MD as Consulting Physician (Urology)  Fam Rose MD as Consulting Physician (Gastroenterology)    Outpatient Medications Prior to Visit   Medication Sig Dispense Refill    aspirin 81 MG chewable tablet Chew 1 tablet 2 (Two) Times a Day.      atorvastatin (LIPITOR) 80 MG tablet TAKE 1 TABLET EVERY DAY 90 tablet 3    azelastine (ASTELIN) 0.1 % nasal spray       esomeprazole (nexIUM) 40 MG capsule TAKE 1 CAPSULE TWICE DAILY 180 capsule 3    losartan-hydrochlorothiazide (HYZAAR) 50-12.5 MG per tablet TAKE 1 TABLET EVERY DAY 90 tablet 3    metoprolol succinate XL (TOPROL-XL) 25 MG 24 hr tablet TAKE 1 TABLET EVERY DAY 90 tablet 3     No facility-administered medications prior to visit.     No opioid medication identified on active medication list. I have reviewed chart for other potential  high risk medication/s and harmful drug interactions in the elderly.      Aspirin is on active medication list. Aspirin use is indicated based on review of current medical condition/s. Pros and cons of this therapy have been discussed today. Benefits of this medication outweigh potential harm.  Patient has been encouraged to continue  "taking this medication.  .      Patient Active Problem List   Diagnosis    Coronary artery disease involving native coronary artery of native heart without angina pectoris    GERD without esophagitis    Hyperlipidemia    Hypertension, essential    Aortic root dilatation    Nonrheumatic aortic valve stenosis    Bicuspid aortic valve    Thoracic aortic aneurysm without rupture    Screening for prostate cancer    URI with cough and congestion    Environmental allergies    Cough due to ACE inhibitor    Globus sensation    Dysphagia    Adenomatous polyp of colon    FH: colon cancer in first degree relative <60 years old    History of adenocarcinoma of prostate    Chawla's esophagus without dysplasia    Altitude sickness, subsequent encounter    COVID-19     Advance Care Planning Advance Directive is not on file.  ACP discussion was held with the patient during this visit. Patient has an advance directive (not in EMR), copy requested.            Objective   Vitals:    11/13/24 0753   BP: 140/74   BP Location: Left arm   Patient Position: Sitting   Cuff Size: Adult   Pulse: 75   Temp: 97.3 °F (36.3 °C)   TempSrc: Infrared   SpO2: 98%   Weight: 77.1 kg (170 lb)   PainSc: 0-No pain       Estimated body mass index is 26.63 kg/m² as calculated from the following:    Height as of 9/10/24: 170.2 cm (67\").    Weight as of this encounter: 77.1 kg (170 lb).    BMI is >= 25 and <30. (Overweight) The following options were offered after discussion;: exercise counseling/recommendations and nutrition counseling/recommendations       Does the patient have evidence of cognitive impairment? No  Lab Results   Component Value Date    CHLPL 144 11/01/2024    TRIG 162 (H) 11/01/2024    HDL 42 11/01/2024    LDL 74 11/01/2024    VLDL 28 11/01/2024                                                                                                Health  Risk Assessment    Smoking Status:  Social History     Tobacco Use   Smoking Status Never    " Passive exposure: Never   Smokeless Tobacco Never   Tobacco Comments    caffeine use     Alcohol Consumption:  Social History     Substance and Sexual Activity   Alcohol Use Not Currently    Alcohol/week: 1.0 standard drink of alcohol       Fall Risk Screen  GIOVANYADI Fall Risk Assessment was completed, and patient is at LOW risk for falls.Assessment completed on:2024    Depression Screening   Little interest or pleasure in doing things? Not at all   Feeling down, depressed, or hopeless? Not at all   PHQ-2 Total Score 0      Health Habits and Functional and Cognitive Screenin/6/2024     7:41 AM   Functional & Cognitive Status   Do you have difficulty preparing food and eating? No    Do you have difficulty bathing yourself, getting dressed or grooming yourself? No    Do you have difficulty using the toilet? No    Do you have difficulty moving around from place to place? No    Do you have trouble with steps or getting out of a bed or a chair? No    Current Diet Well Balanced Diet    Dental Exam Up to date    Eye Exam Up to date    Exercise (times per week) 3 times per week    Current Exercises Include Other;Treadmill;Yard Work    Do you need help using the phone?  No    Are you deaf or do you have serious difficulty hearing?  No    Do you need help to go to places out of walking distance? No    Do you need help shopping? No    Do you need help preparing meals?  No    Do you need help with housework?  No    Do you need help with laundry? No    Do you need help taking your medications? No    Do you need help managing money? No    Do you ever drive or ride in a car without wearing a seat belt? No    Have you felt unusual stress, anger or loneliness in the last month? No    Who do you live with? Spouse    If you need help, do you have trouble finding someone available to you? Yes    Have you been bothered in the last four weeks by sexual problems? No    Do you have difficulty concentrating, remembering or  making decisions? No        Patient-reported           Age-appropriate Screening Schedule:  Refer to the list below for future screening recommendations based on patient's age, sex and/or medical conditions. Orders for these recommended tests are listed in the plan section. The patient has been provided with a written plan.    Health Maintenance List  Health Maintenance   Topic Date Due    Pneumococcal Vaccine 65+ (3 of 3 - PPSV23 or PCV20) 04/30/2024    INFLUENZA VACCINE  08/01/2024    COVID-19 Vaccine (4 - 2024-25 season) 09/01/2024    ANNUAL WELLNESS VISIT  11/06/2024    BMI FOLLOWUP  11/06/2024    LIPID PANEL  11/01/2025    GASTROSCOPY (EGD)  03/24/2026    COLORECTAL CANCER SCREENING  03/24/2026    TDAP/TD VACCINES (3 - Td or Tdap) 08/01/2028    HEPATITIS C SCREENING  Completed    ZOSTER VACCINE  Completed                                                                                                                                                CMS Preventative Services Quick Reference  Risk Factors Identified During Encounter  Immunizations Discussed/Encouraged: Prevnar 20 (Pneumococcal 20-valent conjugate)  Inactivity/Sedentary: Patient was advised to exercise at least 150 minutes a week per CDC recommendations.    The above risks/problems have been discussed with the patient.  Pertinent information has been shared with the patient in the After Visit Summary.  An After Visit Summary and PPPS were made available to the patient.    Follow Up:   Next Medicare Wellness visit to be scheduled in 1 year.         Additional E&M Note during same encounter follows:  Patient has additional, significant, and separately identifiable condition(s)/problem(s) that require work above and beyond the Medicare Wellness Visit     Chief Complaint  Medicare Wellness-subsequent    Subjective   HPI    He will need a separte f/u on HTN, CAD, GERD, hs prostate cancer     He is on Losartan 50/12.5mg daily and Toprol XL 25mg  for HTN.  He checks his BP at home, running 120s-130s/80s. He denies CP, SOA, edema. He is followed by Dr. Berman for Aortic valve stenosis and CAD with stent placement in 2014. Echo on 2/2024 showed ejection fraction 62%, mild to moderate left atrial hypertrophy, normal diastolic function.  Bicuspid aortic valve is present with mild aortic stenosis with valve area 1.04 cm² with mean gradient of 14 mmHg there is trivial valve regurgitation. Ascending aorta is slightly dilated at 3.8 cm.  MR angiogram of the chest on 2/2024 shows aortic root measured 3.4 cm acing aorta measuring 3.9 seconds. He had tried a higher dose of Toprol XL but it lowered his HR too much. Losartan 100/25mg caused him to feel lightheaded. He walks a mile per day, and works on planes and cars.      Progress Notes by Noemi Erickson APRN (09/10/2024 09:30)      He takes Lipitor 80mg for cholesterol control and Nexium 40mg daily for GERD.      He is followed by Dr. Martinez for hx of prostate cancer in 2009.    Chino is also being seen today for additional medical problem/s.    Review of Systems   Constitutional: Negative.    HENT: Negative.     Eyes: Negative.    Respiratory: Negative.     Cardiovascular: Negative.  Negative for chest pain, palpitations and leg swelling.   Gastrointestinal: Negative.    Endocrine: Negative.    Genitourinary: Negative.    Musculoskeletal: Negative.    Allergic/Immunologic: Negative.    Neurological: Negative.    Hematological: Negative.    Psychiatric/Behavioral: Negative.     All other systems reviewed and are negative.             Objective   Vital Signs:  /74 (BP Location: Left arm, Patient Position: Sitting, Cuff Size: Adult)   Pulse 75   Temp 97.3 °F (36.3 °C) (Infrared)   Wt 77.1 kg (170 lb)   SpO2 98%   BMI 26.63 kg/m²   Physical Exam  Vitals reviewed.   Constitutional:       Appearance: Normal appearance. He is not ill-appearing.   HENT:      Head: Normocephalic.      Right Ear: Tympanic membrane normal.       Left Ear: Tympanic membrane normal. There is no impacted cerumen.   Cardiovascular:      Rate and Rhythm: Normal rate and regular rhythm.      Pulses: Normal pulses.      Heart sounds: Normal heart sounds. No murmur heard.  Pulmonary:      Effort: Pulmonary effort is normal. No respiratory distress.      Breath sounds: Normal breath sounds. No stridor.   Abdominal:      General: Abdomen is flat. There is no distension.      Palpations: Abdomen is soft. There is no mass.   Musculoskeletal:         General: Normal range of motion.      Cervical back: Neck supple.      Right lower leg: No edema.      Left lower leg: No edema.   Skin:     General: Skin is warm and dry.   Neurological:      General: No focal deficit present.      Mental Status: He is alert and oriented to person, place, and time.   Psychiatric:         Mood and Affect: Mood normal.         Behavior: Behavior normal.         Thought Content: Thought content normal.       The following data was reviewed by: GARY Graves on 11/13/2024:  Data reviewed : Cardiology studies ECHO and Consultant notes Cardio  CMP          2/12/2024    07:39 3/13/2024    13:05 11/1/2024    08:02   CMP   Glucose  91  87    BUN  16  21    Creatinine 1.30  1.11  1.15    EGFR  72.3     Sodium  138  136    Potassium  4.2  4.9    Chloride  103  100    Calcium  9.5  10.0    Total Protein   6.6    Albumin   4.2    Globulin   2.4    Total Bilirubin   0.5    Alkaline Phosphatase   108    AST (SGOT)   16    ALT (SGPT)   30    BUN/Creatinine Ratio  14.4  18.3    Anion Gap  13.0       CBC          11/1/2024    08:02   CBC   WBC 8.00    RBC 5.80    Hemoglobin 16.2    Hematocrit 48.8    MCV 84.1    MCH 27.9    MCHC 33.2    RDW 13.3    Platelets 314      CBC w/diff          11/1/2024    08:02   CBC w/Diff   WBC 8.00    RBC 5.80    Hemoglobin 16.2    Hematocrit 48.8    MCV 84.1    MCH 27.9    MCHC 33.2    RDW 13.3    Platelets 314    Neutrophil Rel % 51.4    Lymphocyte Rel % 35.1     Monocyte Rel % 9.9    Eosinophil Rel % 2.4    Basophil Rel % 0.9      Lipid Panel          11/1/2024    08:02   Lipid Panel   Total Cholesterol 144    Triglycerides 162    HDL Cholesterol 42    VLDL Cholesterol 28    LDL Cholesterol  74              Assessment and Plan            Encounter for annual wellness visit (AWV) in Medicare patient         Coronary artery disease involving native coronary artery of native heart without angina pectoris      Orders:    Comprehensive Metabolic Panel; Future    CBC & Differential; Future    Lipid Panel With / Chol / HDL Ratio; Future    Hypertension, essential      Orders:    Comprehensive Metabolic Panel; Future    CBC & Differential; Future    Lipid Panel With / Chol / HDL Ratio; Future    Mixed hyperlipidemia       Orders:    Comprehensive Metabolic Panel; Future    Lipid Panel With / Chol / HDL Ratio; Future    Chawla's esophagus without dysplasia    Orders:    Comprehensive Metabolic Panel; Future    Lipid Panel With / Chol / HDL Ratio; Future    Non-seasonal allergic rhinitis due to pollen         Screening for malignant neoplasm of prostate    Orders:    PSA Screen; Future    Need for vaccination for pneumococcus    Orders:    Pneumococcal Conjugate Vaccine 20-Valent All     Diagnosis Plan   1. Encounter for annual wellness visit (AWV) in Medicare patient        2. Coronary artery disease involving native coronary artery of native heart without angina pectoris  Comprehensive Metabolic Panel    CBC & Differential    Lipid Panel With / Chol / HDL Ratio    Chronic, reviewed caridiac notes and ECHO from 2/2024      3. Hypertension, essential  Comprehensive Metabolic Panel    CBC & Differential    Lipid Panel With / Chol / HDL Ratio    Chronnic, BP at goal on Losartan 50/12.5mg and Toprol XL 25mg      4. Mixed hyperlipidemia  Comprehensive Metabolic Panel    Lipid Panel With / Chol / HDL Ratio    Chronic, LDL goal < 100 on liptior 80mg nightly      5. Chawla's esophagus  without dysplasia  Comprehensive Metabolic Panel    Lipid Panel With / Chol / HDL Ratio    Chronic, EGD up to date, Nexium 40mg BID      6. Non-seasonal allergic rhinitis due to pollen      Astelin nasal spray      7. Screening for malignant neoplasm of prostate  PSA Screen      8. Need for vaccination for pneumococcus  Pneumococcal Conjugate Vaccine 20-Valent All               I spent 45 minutes caring for Chino on this date of service. This time includes time spent by me in the following activities:preparing for the visit, reviewing tests, obtaining and/or reviewing a separately obtained history, performing a medically appropriate examination and/or evaluation , counseling and educating the patient/family/caregiver, ordering medications, tests, or procedures, documenting information in the medical record, and independently interpreting results and communicating that information with the patient/family/caregiver  Follow Up   1 yr AWV and labs  Patient was given instructions and counseling regarding his condition or for health maintenance advice. Please see specific information pulled into the AVS if appropriate.

## 2024-11-13 NOTE — ASSESSMENT & PLAN NOTE
Orders:    Comprehensive Metabolic Panel; Future    CBC & Differential; Future    Lipid Panel With / Chol / HDL Ratio; Future

## 2024-12-15 RX ORDER — ATORVASTATIN CALCIUM 80 MG/1
TABLET, FILM COATED ORAL
Qty: 90 TABLET | Refills: 3 | Status: SHIPPED | OUTPATIENT
Start: 2024-12-15

## 2025-01-10 ENCOUNTER — HOSPITAL ENCOUNTER (OUTPATIENT)
Dept: MRI IMAGING | Facility: HOSPITAL | Age: 70
Discharge: HOME | End: 2025-01-10
Payer: MEDICARE

## 2025-01-10 ENCOUNTER — TELEPHONE (OUTPATIENT)
Dept: CARDIOLOGY | Facility: CLINIC | Age: 70
End: 2025-01-10
Payer: MEDICARE

## 2025-01-10 DIAGNOSIS — E78.2 MIXED HYPERLIPIDEMIA: ICD-10-CM

## 2025-01-10 DIAGNOSIS — I10 PRIMARY HYPERTENSION: ICD-10-CM

## 2025-01-10 DIAGNOSIS — I10 HYPERTENSION, ESSENTIAL: ICD-10-CM

## 2025-01-10 DIAGNOSIS — I71.21 ANEURYSM OF ASCENDING AORTA WITHOUT RUPTURE: ICD-10-CM

## 2025-01-10 DIAGNOSIS — I25.10 CORONARY ARTERY DISEASE INVOLVING NATIVE CORONARY ARTERY OF NATIVE HEART WITHOUT ANGINA PECTORIS: ICD-10-CM

## 2025-01-10 DIAGNOSIS — I35.0 NONRHEUMATIC AORTIC VALVE STENOSIS: ICD-10-CM

## 2025-01-10 LAB — CREAT BLDA-MCNC: 1.3 MG/DL (ref 0.6–1.3)

## 2025-01-10 PROCEDURE — 82565 ASSAY OF CREATININE: CPT

## 2025-01-10 PROCEDURE — C8911 MRA W/O FOL W/CONT, CHEST: HCPCS

## 2025-01-10 PROCEDURE — 25510000002 GADOBENATE DIMEGLUMINE 529 MG/ML SOLUTION: Performed by: NURSE PRACTITIONER

## 2025-01-10 PROCEDURE — A9577 INJ MULTIHANCE: HCPCS | Performed by: NURSE PRACTITIONER

## 2025-01-10 RX ADMIN — GADOBENATE DIMEGLUMINE 15 ML: 529 INJECTION, SOLUTION INTRAVENOUS at 09:20

## 2025-01-10 NOTE — TELEPHONE ENCOUNTER
Please let him know that MRI shows aorta is stable in size.  Maximum diameter 3.9 cm.  Would continue current medications and keep currently scheduled follow-up appointment.

## 2025-01-10 NOTE — TELEPHONE ENCOUNTER
Notified patient of results/recommendations. Patient verbalized understanding.    Corrina Beltre RN  [place] [facility] [place]

## 2025-03-07 RX ORDER — ESOMEPRAZOLE MAGNESIUM 40 MG/1
40 CAPSULE, DELAYED RELEASE ORAL 2 TIMES DAILY
Qty: 180 CAPSULE | Refills: 3 | Status: SHIPPED | OUTPATIENT
Start: 2025-03-07

## 2025-03-07 RX ORDER — METOPROLOL SUCCINATE 25 MG/1
25 TABLET, EXTENDED RELEASE ORAL DAILY
Qty: 90 TABLET | Refills: 1 | Status: SHIPPED | OUTPATIENT
Start: 2025-03-07

## 2025-03-07 NOTE — TELEPHONE ENCOUNTER
Rx Refill Note  Requested Prescriptions     Pending Prescriptions Disp Refills    esomeprazole (nexIUM) 40 MG capsule [Pharmacy Med Name: Esomeprazole Magnesium Oral Capsule Delayed Release 40 MG] 180 capsule 3     Sig: TAKE 1 CAPSULE TWICE DAILY      Last office visit with prescribing clinician: 11/13/2024   Last telemedicine visit with prescribing clinician: Visit date not found   Next office visit with prescribing clinician: 11/17/2025                         Would you like a call back once the refill request has been completed: [] Yes [] No    If the office needs to give you a call back, can they leave a voicemail: [] Yes [] No    More Henderson MA  03/07/25, 10:42 EST

## 2025-03-19 ENCOUNTER — OFFICE VISIT (OUTPATIENT)
Dept: CARDIOLOGY | Facility: CLINIC | Age: 70
End: 2025-03-19
Payer: MEDICARE

## 2025-03-19 VITALS
DIASTOLIC BLOOD PRESSURE: 82 MMHG | OXYGEN SATURATION: 96 % | SYSTOLIC BLOOD PRESSURE: 120 MMHG | HEIGHT: 67 IN | BODY MASS INDEX: 26.68 KG/M2 | HEART RATE: 61 BPM | WEIGHT: 170 LBS | RESPIRATION RATE: 16 BRPM

## 2025-03-19 DIAGNOSIS — E78.2 MIXED HYPERLIPIDEMIA: ICD-10-CM

## 2025-03-19 DIAGNOSIS — I77.810 AORTIC ROOT DILATATION: ICD-10-CM

## 2025-03-19 DIAGNOSIS — I35.0 NONRHEUMATIC AORTIC VALVE STENOSIS: Primary | ICD-10-CM

## 2025-03-19 DIAGNOSIS — I25.10 CORONARY ARTERY DISEASE INVOLVING NATIVE CORONARY ARTERY OF NATIVE HEART WITHOUT ANGINA PECTORIS: ICD-10-CM

## 2025-03-19 DIAGNOSIS — Q23.81 BICUSPID AORTIC VALVE: ICD-10-CM

## 2025-03-19 PROCEDURE — 3074F SYST BP LT 130 MM HG: CPT | Performed by: INTERNAL MEDICINE

## 2025-03-19 PROCEDURE — 99214 OFFICE O/P EST MOD 30 MIN: CPT | Performed by: INTERNAL MEDICINE

## 2025-03-19 PROCEDURE — 1159F MED LIST DOCD IN RCRD: CPT | Performed by: INTERNAL MEDICINE

## 2025-03-19 PROCEDURE — 3079F DIAST BP 80-89 MM HG: CPT | Performed by: INTERNAL MEDICINE

## 2025-03-19 PROCEDURE — 1160F RVW MEDS BY RX/DR IN RCRD: CPT | Performed by: INTERNAL MEDICINE

## 2025-03-19 PROCEDURE — 93000 ELECTROCARDIOGRAM COMPLETE: CPT | Performed by: INTERNAL MEDICINE

## 2025-03-25 ENCOUNTER — RESULTS FOLLOW-UP (OUTPATIENT)
Dept: CARDIOLOGY | Facility: CLINIC | Age: 70
End: 2025-03-25
Payer: MEDICARE

## 2025-03-25 ENCOUNTER — LAB (OUTPATIENT)
Dept: LAB | Facility: HOSPITAL | Age: 70
End: 2025-03-25
Payer: MEDICARE

## 2025-03-25 PROCEDURE — 84479 ASSAY OF THYROID (T3 OR T4): CPT | Performed by: INTERNAL MEDICINE

## 2025-03-25 PROCEDURE — 80061 LIPID PANEL: CPT | Performed by: INTERNAL MEDICINE

## 2025-03-25 PROCEDURE — 84443 ASSAY THYROID STIM HORMONE: CPT | Performed by: INTERNAL MEDICINE

## 2025-03-25 PROCEDURE — 84436 ASSAY OF TOTAL THYROXINE: CPT | Performed by: INTERNAL MEDICINE

## 2025-03-25 NOTE — TELEPHONE ENCOUNTER
Please let him know that LDL is at goal, but HDL is slightly low and triglycerides are elevated.  Would have him decrease sugars and carbohydrates in his diet, and also increase activity.  Continue current medications.

## 2025-03-31 ENCOUNTER — HOSPITAL ENCOUNTER (OUTPATIENT)
Dept: CARDIOLOGY | Facility: HOSPITAL | Age: 70
Discharge: HOME | End: 2025-03-31
Admitting: INTERNAL MEDICINE
Payer: MEDICARE

## 2025-03-31 VITALS
HEART RATE: 62 BPM | DIASTOLIC BLOOD PRESSURE: 78 MMHG | BODY MASS INDEX: 26.68 KG/M2 | HEIGHT: 67 IN | OXYGEN SATURATION: 95 % | SYSTOLIC BLOOD PRESSURE: 140 MMHG | WEIGHT: 170 LBS

## 2025-03-31 DIAGNOSIS — I35.0 NONRHEUMATIC AORTIC VALVE STENOSIS: ICD-10-CM

## 2025-03-31 LAB
AORTIC ARCH: 3 CM
AORTIC DIMENSIONLESS INDEX: 0.33 (DI)
ASCENDING AORTA: 3.7 CM
AV MEAN PRESS GRAD SYS DOP V1V2: 19.3 MMHG
AV VMAX SYS DOP: 286.4 CM/SEC
BH CV ECHO MEAS - ACS: 0.87 CM
BH CV ECHO MEAS - AO MAX PG: 32.8 MMHG
BH CV ECHO MEAS - AO ROOT AREA (BSA CORRECTED): 1.9 CM2
BH CV ECHO MEAS - AO ROOT DIAM: 3.5 CM
BH CV ECHO MEAS - AO V2 VTI: 69.3 CM
BH CV ECHO MEAS - AVA(I,D): 0.93 CM2
BH CV ECHO MEAS - EDV(CUBED): 91.1 ML
BH CV ECHO MEAS - EDV(MOD-SP2): 84 ML
BH CV ECHO MEAS - EDV(MOD-SP4): 96 ML
BH CV ECHO MEAS - EF(MOD-SP2): 69 %
BH CV ECHO MEAS - EF(MOD-SP4): 65.6 %
BH CV ECHO MEAS - ESV(CUBED): 26.8 ML
BH CV ECHO MEAS - ESV(MOD-SP2): 26 ML
BH CV ECHO MEAS - ESV(MOD-SP4): 33 ML
BH CV ECHO MEAS - FS: 33.5 %
BH CV ECHO MEAS - IVS/LVPW: 0.9 CM
BH CV ECHO MEAS - IVSD: 0.9 CM
BH CV ECHO MEAS - LA 3D VOL INDEX: 35
BH CV ECHO MEAS - LAT PEAK E' VEL: 6.6 CM/SEC
BH CV ECHO MEAS - LV DIASTOLIC VOL/BSA (35-75): 50.9 CM2
BH CV ECHO MEAS - LV MASS(C)D: 142.9 GRAMS
BH CV ECHO MEAS - LV MAX PG: 3.2 MMHG
BH CV ECHO MEAS - LV MEAN PG: 2.21 MMHG
BH CV ECHO MEAS - LV SYSTOLIC VOL/BSA (12-30): 17.5 CM2
BH CV ECHO MEAS - LV V1 MAX: 88.8 CM/SEC
BH CV ECHO MEAS - LV V1 VTI: 22.5 CM
BH CV ECHO MEAS - LVIDD: 4.5 CM
BH CV ECHO MEAS - LVIDS: 3 CM
BH CV ECHO MEAS - LVOT AREA: 2.8 CM2
BH CV ECHO MEAS - LVOT DIAM: 1.9 CM
BH CV ECHO MEAS - LVPWD: 1 CM
BH CV ECHO MEAS - MED PEAK E' VEL: 5.4 CM/SEC
BH CV ECHO MEAS - MV A DUR: 0.11 SEC
BH CV ECHO MEAS - MV A MAX VEL: 77.9 CM/SEC
BH CV ECHO MEAS - MV DEC SLOPE: 419 CM/SEC2
BH CV ECHO MEAS - MV DEC TIME: 0.21 SEC
BH CV ECHO MEAS - MV E MAX VEL: 58 CM/SEC
BH CV ECHO MEAS - MV E/A: 0.74
BH CV ECHO MEAS - MV MAX PG: 2.9 MMHG
BH CV ECHO MEAS - MV MEAN PG: 0.97 MMHG
BH CV ECHO MEAS - MV P1/2T: 53.7 MSEC
BH CV ECHO MEAS - MV V2 VTI: 34.2 CM
BH CV ECHO MEAS - MVA(P1/2T): 4.1 CM2
BH CV ECHO MEAS - MVA(VTI): 1.88 CM2
BH CV ECHO MEAS - PA ACC TIME: 0.08 SEC
BH CV ECHO MEAS - PA V2 MAX: 95 CM/SEC
BH CV ECHO MEAS - PULM A REVS DUR: 0.12 SEC
BH CV ECHO MEAS - PULM A REVS VEL: 39.4 CM/SEC
BH CV ECHO MEAS - PULM DIAS VEL: 39 CM/SEC
BH CV ECHO MEAS - PULM S/D: 1.22
BH CV ECHO MEAS - PULM SYS VEL: 47.6 CM/SEC
BH CV ECHO MEAS - QP/QS: 0.94
BH CV ECHO MEAS - RAP SYSTOLE: 3 MMHG
BH CV ECHO MEAS - RV MAX PG: 1.67 MMHG
BH CV ECHO MEAS - RV V1 MAX: 64.7 CM/SEC
BH CV ECHO MEAS - RV V1 VTI: 15.1 CM
BH CV ECHO MEAS - RVOT DIAM: 2.25 CM
BH CV ECHO MEAS - RVSP: 17.4 MMHG
BH CV ECHO MEAS - SV(LVOT): 64.2 ML
BH CV ECHO MEAS - SV(MOD-SP2): 58 ML
BH CV ECHO MEAS - SV(MOD-SP4): 63 ML
BH CV ECHO MEAS - SV(RVOT): 60.3 ML
BH CV ECHO MEAS - SVI(LVOT): 34 ML/M2
BH CV ECHO MEAS - SVI(MOD-SP2): 30.7 ML/M2
BH CV ECHO MEAS - SVI(MOD-SP4): 33.4 ML/M2
BH CV ECHO MEAS - TAPSE (>1.6): 1.74 CM
BH CV ECHO MEAS - TR MAX PG: 14.4 MMHG
BH CV ECHO MEAS - TR MAX VEL: 190 CM/SEC
BH CV ECHO MEASUREMENTS AVERAGE E/E' RATIO: 9.67
BH CV XLRA - RV BASE: 3 CM
BH CV XLRA - RV LENGTH: 7.7 CM
BH CV XLRA - RV MID: 2.7 CM
BH CV XLRA - TDI S': 8.7 CM/SEC
LEFT ATRIUM VOLUME INDEX: 22 ML/M2
LV EF 3D SEGMENTATION: 55 %
LV EF BIPLANE MOD: 69.5 %
SINUS: 3.6 CM
STJ: 2.7 CM

## 2025-03-31 PROCEDURE — 93306 TTE W/DOPPLER COMPLETE: CPT

## 2025-03-31 PROCEDURE — 93306 TTE W/DOPPLER COMPLETE: CPT | Performed by: INTERNAL MEDICINE

## 2025-04-07 ENCOUNTER — TELEMEDICINE (OUTPATIENT)
Dept: FAMILY MEDICINE CLINIC | Facility: TELEHEALTH | Age: 70
End: 2025-04-07
Payer: MEDICARE

## 2025-04-07 DIAGNOSIS — U07.1 COVID-19: Primary | ICD-10-CM

## 2025-04-07 PROCEDURE — 99213 OFFICE O/P EST LOW 20 MIN: CPT | Performed by: NURSE PRACTITIONER

## 2025-04-07 PROCEDURE — 1159F MED LIST DOCD IN RCRD: CPT | Performed by: NURSE PRACTITIONER

## 2025-04-07 PROCEDURE — 1160F RVW MEDS BY RX/DR IN RCRD: CPT | Performed by: NURSE PRACTITIONER

## 2025-04-07 RX ORDER — LOSARTAN POTASSIUM 25 MG/1
25 TABLET ORAL
COMMUNITY

## 2025-04-07 RX ORDER — ATORVASTATIN CALCIUM 80 MG/1
80 TABLET, FILM COATED ORAL
COMMUNITY

## 2025-04-07 RX ORDER — METOPROLOL SUCCINATE 25 MG/1
CAPSULE, EXTENDED RELEASE ORAL EVERY 24 HOURS
COMMUNITY

## 2025-04-07 NOTE — PROGRESS NOTES
Subjective   Chief Complaint   Patient presents with    URI       Chino Huber is a 69 y.o. male.     History of Present Illness  Patient reports fever, congestion, cough, chest congestion that started about 3 days ago.  He and his wife tested positive for COVID.  He has a history of COVID 1 time, he took Paxlovid successfully.  He has been vaccinated against COVID + 2 boosters. GFR 68.9  URI   This is a new problem. Episode onset: 3 days. The problem has been unchanged. Associated symptoms include congestion, coughing and headaches. Pertinent negatives include no chest pain, diarrhea, nausea, rhinorrhea, sore throat, vomiting or wheezing.        Allergies   Allergen Reactions    Lisinopril Cough       Past Medical History:   Diagnosis Date    Acute sinusitis     Allergic 1969    Aortic root dilatation     Aortic stenosis     Aortic valve stenosis     mild    Arteriosclerotic coronary artery disease     Bicuspid aortic valve     CAD (coronary artery disease)     Cancer 2009    Prostate    Chest pain     Colon polyp     Cough     Dilated aortic root     ED (erectile dysfunction)     Erectile dysfunction 2009    Prostatectomy    Folliculitis     GERD (gastroesophageal reflux disease)     Heart murmur 2013    Aortic bicuspid    Heartburn     Hyperlipidemia     Hypertension     Nonspecific abnormal findings on radiological and examination of intrathoracic organs     Pneumonia 1996    Precordial pain     Reflux esophagitis     Sore throat     Thoracic aortic aneurysm without rupture     Throat pain     Urinary tract infection 1992       Past Surgical History:   Procedure Laterality Date    ABDOMINAL SURGERY  Prostate    CARDIAC CATHETERIZATION  06/2013    Stent placement    CLOSED REDUCTION FOREARM FRACTURE Right     COLONOSCOPY      COLONOSCOPY      COLONOSCOPY N/A 02/03/2017    Procedure: COLONOSCOPY TO CECUM WITH HOT SNARE POLYPECTOMY WITH NORMAL SALINE INJECTION AND  TWO RESOLUTION CLIPS;  Surgeon: Fam Rose  MD MAXX;  Location: [facility] ENDOSCOPY;  Service:     COLONOSCOPY N/A 01/31/2020    Procedure: COLONOSCOPY TO TI  WITH POLYPECTOMY (COLD SNARE);  Surgeon: Fam Rose MD;  Location: [facility] ENDOSCOPY;  Service: Gastroenterology    COLONOSCOPY N/A 3/24/2023    Procedure: COLONOSCOPY to cecum/ terminal ileum with biopsy and hot and cold snare polypectomies and resolution clip placement;  Surgeon: Fam Rose MD;  Location: [facility] ENDOSCOPY;  Service: Gastroenterology;  Laterality: N/A;  pre- hx colon polyps   post- polyps, diverticuolosis, hemorrhoids     CORONARY ANGIOPLASTY WITH STENT PLACEMENT      CORONARY STENT PLACEMENT  06/2013    ENDOSCOPY N/A 03/15/2017    Procedure: ESOPHAGOGASTRODUODENOSCOPY WITH BIOPSIES (COLD);  Surgeon: Fam LILLY MD;  Location: [facility] ENDOSCOPY;  Service:     ENDOSCOPY N/A 01/31/2020    Procedure: ESOPHAGOGASTRODUODENOSCOPY WITH BIOPSIES;  Surgeon: Fam Rose MD;  Location: [facility] ENDOSCOPY;  Service: Gastroenterology    ENDOSCOPY N/A 3/24/2023    Procedure: ESOPHAGOGASTRODUODENOSCOPY with bx;  Surgeon: Fma Rose MD;  Location: [facility] ENDOSCOPY;  Service: Gastroenterology;  Laterality: N/A;  pre- reflux,hx  barretts  post- small hiatal hernia, gastirts, esophagitis     LASIK Bilateral     PROSTATE SURGERY      PROSTATE SURGERY      RHINOPLASTY      RHINOPLASTY      SINUS SURGERY  1987/1988    TONSILLECTOMY  1962    UPPER GASTROINTESTINAL ENDOSCOPY  03/2017    VASECTOMY  1990       Social History     Socioeconomic History    Marital status:    Tobacco Use    Smoking status: Never     Passive exposure: Never    Smokeless tobacco: Never    Tobacco comments:     caffeine use   Vaping Use    Vaping status: Never Used   Substance and Sexual Activity    Alcohol use: Not Currently     Alcohol/week: 1.0 standard drink of alcohol    Drug use: Never    Sexual activity: Not Currently     Partners: Female     Birth control/protection: Surgical      Comment: Vasectomy, Prostatectomy       Family History   Problem Relation Age of Onset    Heart disease Mother     Hypertension Mother     Colon cancer Mother          due to colon cancer    Cancer Mother         colon    Hearing loss Mother     Heart disease Father     Hypertension Father     Coronary artery disease Father     Heart failure Father     Cancer Father         prostate    Glaucoma Father     Cancer Sister         Breast    Hypertension Sister     Colon polyps Sister         Recent polyp remival    Cancer Sister         Breast    Hypertension Sister     Malig Hyperthermia Neg Hx          Current Outpatient Medications:     aspirin 81 MG chewable tablet, Chew 1 tablet 2 (Two) Times a Day., Disp: , Rfl:     atorvastatin (LIPITOR) 80 MG tablet, TAKE 1 TABLET EVERY DAY, Disp: 90 tablet, Rfl: 3    atorvastatin (LIPITOR) 80 MG tablet, Take 1 tablet by mouth., Disp: , Rfl:     azelastine (ASTELIN) 0.1 % nasal spray, Administer 2 sprays into the nostril(s) as directed by provider 2 (Two) Times a Day. Use in each nostril as directed, Disp: 90 mL, Rfl: 3    esomeprazole (nexIUM) 40 MG capsule, TAKE 1 CAPSULE TWICE DAILY, Disp: 180 capsule, Rfl: 3    losartan (COZAAR) 25 MG tablet, Take 1 tablet by mouth., Disp: , Rfl:     losartan-hydrochlorothiazide (HYZAAR) 50-12.5 MG per tablet, TAKE 1 TABLET EVERY DAY, Disp: 90 tablet, Rfl: 3    Metoprolol Succinate (Kapspargo Sprinkle) 25 MG capsule extended-release 24 hour sprinkle, Take  by mouth Daily., Disp: , Rfl:     metoprolol succinate XL (TOPROL-XL) 25 MG 24 hr tablet, TAKE 1 TABLET EVERY DAY, Disp: 90 tablet, Rfl: 1    Nirmatrelvir & Ritonavir, 300mg/100mg, (PAXLOVID), Take 3 tablets by mouth 2 (Two) Times a Day for 5 days., Disp: 30 tablet, Rfl: 0      Review of Systems   Constitutional:  Positive for activity change, chills, diaphoresis, fatigue and fever.   HENT:  Positive for congestion. Negative for rhinorrhea and sore throat.    Respiratory:  Positive  for cough. Negative for chest tightness, shortness of breath and wheezing.    Cardiovascular:  Negative for chest pain.   Gastrointestinal:  Negative for diarrhea, nausea and vomiting.   Musculoskeletal:  Negative for myalgias.   Neurological:  Negative for headache.        There were no vitals filed for this visit.    Objective   Physical Exam  Constitutional:       General: He is not in acute distress.     Appearance: Normal appearance. He is not ill-appearing, toxic-appearing or diaphoretic.   HENT:      Head: Normocephalic.      Nose: Congestion present.      Mouth/Throat:      Lips: Pink.      Mouth: Mucous membranes are moist.   Pulmonary:      Effort: Pulmonary effort is normal.   Neurological:      Mental Status: He is alert and oriented to person, place, and time.          Procedures     Assessment & Plan   Diagnoses and all orders for this visit:    1. COVID-19 (Primary)  -     Nirmatrelvir & Ritonavir, 300mg/100mg, (PAXLOVID); Take 3 tablets by mouth 2 (Two) Times a Day for 5 days.  Dispense: 30 tablet; Refill: 0      Hold atorvastatin/Lipitor while you are on Paxlovid.  You may resume 1 to 2 days after treatment is finished.  Continue treating symptoms with over-the-counter medicine of choice.  Tylenol for pain and/or fever, stay hydrated and rest.     If symptoms worsen or do not improve follow up with your PCP or visit your nearest Urgent Care Center or ER.        PLAN: Discussed dosing, side effects, recommended other symptomatic care.  Patient should follow up with primary care provider, Urgent Care or ER if symptoms worsen, fail to resolve or other symptoms need attention. Patient/family agree to the above.         GARY Fournier         This visit was performed via Telehealth.  This patient has been instructed to follow-up with their primary care provider if their symptoms worsen or the treatment provided does not resolve their illness.

## 2025-04-07 NOTE — PATIENT INSTRUCTIONS
Hold atorvastatin/Lipitor while you are on Paxlovid.  You may resume 1 to 2 days after treatment is finished.  Continue treating symptoms with over-the-counter medicine of choice.  Tylenol for pain and/or fever, stay hydrated and rest.     If symptoms worsen or do not improve follow up with your PCP or visit your nearest Urgent Care Center or ER.

## 2025-05-16 ENCOUNTER — HOSPITAL ENCOUNTER (OUTPATIENT)
Dept: GENERAL RADIOLOGY | Facility: HOSPITAL | Age: 70
Discharge: HOME | End: 2025-05-16
Payer: MEDICARE

## 2025-05-16 DIAGNOSIS — J40 BRONCHITIS: ICD-10-CM

## 2025-05-16 PROCEDURE — 71046 X-RAY EXAM CHEST 2 VIEWS: CPT

## 2025-05-19 ENCOUNTER — PATIENT ROUNDING (BHMG ONLY) (OUTPATIENT)
Dept: URGENT CARE | Facility: CLINIC | Age: 70
End: 2025-05-19
Payer: MEDICARE

## 2025-05-19 NOTE — ED NOTES
Thank you for letting us care for you in your recent visit to our urgent care center. We would love to hear about your experience with us. Was this the first time you have visited our location?    We’re always looking for ways to make our patients’ experiences even better. Do you have any recommendations on ways we may improve?     I appreciate you taking the time to respond. Please be on the lookout for a survey about your recent visit from [employer] via text or email. We would greatly appreciate if you could fill that out and turn it back in. We want your voice to be heard and we value your feedback.   Thank you for choosing [facility] for your healthcare needs.

## 2025-05-20 ENCOUNTER — OFFICE VISIT (OUTPATIENT)
Dept: INTERNAL MEDICINE | Facility: CLINIC | Age: 70
End: 2025-05-20
Payer: MEDICARE

## 2025-05-20 VITALS
BODY MASS INDEX: 26.81 KG/M2 | HEART RATE: 87 BPM | TEMPERATURE: 98.2 F | HEIGHT: 67 IN | OXYGEN SATURATION: 97 % | WEIGHT: 170.8 LBS | SYSTOLIC BLOOD PRESSURE: 126 MMHG | DIASTOLIC BLOOD PRESSURE: 80 MMHG

## 2025-05-20 DIAGNOSIS — J30.1 NON-SEASONAL ALLERGIC RHINITIS DUE TO POLLEN: ICD-10-CM

## 2025-05-20 DIAGNOSIS — Z91.09 ENVIRONMENTAL ALLERGIES: ICD-10-CM

## 2025-05-20 DIAGNOSIS — J45.21 MILD INTERMITTENT REACTIVE AIRWAY DISEASE WITH WHEEZING WITH ACUTE EXACERBATION: Primary | ICD-10-CM

## 2025-05-20 PROBLEM — J45.909 REACTIVE AIRWAY DISEASE WITH WHEEZING: Status: ACTIVE | Noted: 2025-05-20

## 2025-05-20 RX ORDER — LEVOCETIRIZINE DIHYDROCHLORIDE 5 MG/1
5 TABLET, FILM COATED ORAL EVERY EVENING
Qty: 30 TABLET | Refills: 0 | Status: SHIPPED | OUTPATIENT
Start: 2025-05-20

## 2025-05-20 RX ORDER — MONTELUKAST SODIUM 10 MG/1
10 TABLET ORAL NIGHTLY
Qty: 30 TABLET | Refills: 0 | Status: SHIPPED | OUTPATIENT
Start: 2025-05-20

## 2025-05-20 RX ORDER — FLUTICASONE FUROATE, UMECLIDINIUM BROMIDE AND VILANTEROL TRIFENATATE 100; 62.5; 25 UG/1; UG/1; UG/1
1 POWDER RESPIRATORY (INHALATION)
Qty: 14 EACH | Refills: 0 | COMMUNITY
Start: 2025-05-20

## 2025-05-20 NOTE — PROGRESS NOTES
Chief Complaint   Patient presents with    Cough     Pos Covid in the first week of April. Did not take paxlovid. About 2 weeks later he had cold symptoms again without fever. UC 5/11 and 5/16. No swabs but chest xray neg for pneumonia. Finished antibiotic and steroid 2 days ago.        Michael Huber is a 69 y.o. male being seen for a follow up appointment today regarding persistent cough for 6 weeks. He tested positive covid on 4-7-2025 but Paxlovid was $700. He felt like he recovered from this, and then redeveloped symptoms the beginning of May.He was seen in urgent care 5- and 5- for similar symptoms. He was treated with prednisone, doxy and tessalon. CXR was completed 5- showing no active disease. He is describing bronchial congestion, non-producitve cough, SOA, chest tightness and wheezing. He tried vapor rub, mucinex. He is highly atopic and was seen by Dr Serrato, but he did not want to take allergy shots ALLERGY/IMMUNOLOGY - SCAN - F/U ALLERGIC RHINITIS & GERD, FAMILY ALLERGY, 08/04/2023 (08/04/2023)       History of Present Illness     Allergies   Allergen Reactions    Lisinopril Cough         Current Outpatient Medications:     aspirin 81 MG chewable tablet, Chew 1 tablet 2 (Two) Times a Day., Disp: , Rfl:     atorvastatin (LIPITOR) 80 MG tablet, TAKE 1 TABLET EVERY DAY, Disp: 90 tablet, Rfl: 3    azelastine (ASTELIN) 0.1 % nasal spray, Administer 2 sprays into the nostril(s) as directed by provider 2 (Two) Times a Day. Use in each nostril as directed, Disp: 90 mL, Rfl: 3    esomeprazole (nexIUM) 40 MG capsule, TAKE 1 CAPSULE TWICE DAILY, Disp: 180 capsule, Rfl: 3    losartan-hydrochlorothiazide (HYZAAR) 50-12.5 MG per tablet, TAKE 1 TABLET EVERY DAY, Disp: 90 tablet, Rfl: 3    metoprolol succinate XL (TOPROL-XL) 25 MG 24 hr tablet, TAKE 1 TABLET EVERY DAY, Disp: 90 tablet, Rfl: 1    promethazine-dextromethorphan (PROMETHAZINE-DM) 6.25-15 MG/5ML syrup, Take 5 mL by mouth 4  (Four) Times a Day As Needed for Cough for up to 5 days., Disp: 118 mL, Rfl: 0    atorvastatin (LIPITOR) 80 MG tablet, Take 1 tablet by mouth., Disp: , Rfl:     losartan (COZAAR) 25 MG tablet, Take 1 tablet by mouth., Disp: , Rfl:     Metoprolol Succinate (Kapspargo Sprinkle) 25 MG capsule extended-release 24 hour sprinkle, Take  by mouth Daily., Disp: , Rfl:     The following portions of the patient's history were reviewed and updated as appropriate: allergies, current medications, past family history, past medical history, past social history, past surgical history, and problem list.    Review of Systems   Constitutional: Negative.    HENT:  Positive for congestion, rhinorrhea, sinus pressure and sinus pain.    Eyes: Negative.    Respiratory:  Positive for cough, shortness of breath and wheezing.    Cardiovascular: Negative.  Negative for chest pain, palpitations and leg swelling.   Gastrointestinal: Negative.    Endocrine: Negative.    Musculoskeletal: Negative.    Allergic/Immunologic: Positive for environmental allergies.   Neurological: Negative.    All other systems reviewed and are negative.      Assessment     Physical Exam  Vitals reviewed.   Constitutional:       Appearance: Normal appearance. He is not ill-appearing.   HENT:      Head: Normocephalic.      Right Ear: Tympanic membrane normal.      Left Ear: Tympanic membrane normal.   Cardiovascular:      Rate and Rhythm: Normal rate and regular rhythm.      Pulses: Normal pulses.      Heart sounds: Normal heart sounds. No murmur heard.  Pulmonary:      Effort: Pulmonary effort is normal. No respiratory distress.      Breath sounds: No stridor. Wheezing present. No rhonchi or rales.   Skin:     General: Skin is warm and dry.   Neurological:      General: No focal deficit present.      Mental Status: He is alert and oriented to person, place, and time.      Gait: Gait normal.   Psychiatric:         Mood and Affect: Mood normal.         Behavior: Behavior  normal.         Thought Content: Thought content normal.         Plan         Diagnoses and all orders for this visit:    1. Mild intermittent reactive airway disease with wheezing with acute exacerbation (Primary)  -     montelukast (Singulair) 10 MG tablet; Take 1 tablet by mouth Every Night.  Dispense: 30 tablet; Refill: 0  -     levocetirizine (XYZAL) 5 MG tablet; Take 1 tablet by mouth Every Evening.  Dispense: 30 tablet; Refill: 0  -     Fluticasone-Umeclidin-Vilant (Trelegy Ellipta) 100-62.5-25 MCG/ACT inhaler; Inhale 1 puff Daily.  Dispense: 14 each; Refill: 0    2. Environmental allergies  -     montelukast (Singulair) 10 MG tablet; Take 1 tablet by mouth Every Night.  Dispense: 30 tablet; Refill: 0  -     levocetirizine (XYZAL) 5 MG tablet; Take 1 tablet by mouth Every Evening.  Dispense: 30 tablet; Refill: 0    3. Non-seasonal allergic rhinitis due to pollen  -     montelukast (Singulair) 10 MG tablet; Take 1 tablet by mouth Every Night.  Dispense: 30 tablet; Refill: 0  -     levocetirizine (XYZAL) 5 MG tablet; Take 1 tablet by mouth Every Evening.  Dispense: 30 tablet; Refill: 0        MDI demo on trelegy and completed in office. Discussed post viral RAD and treatment.    Follow up in 2 weeks as needed

## 2025-06-15 DIAGNOSIS — J30.1 NON-SEASONAL ALLERGIC RHINITIS DUE TO POLLEN: ICD-10-CM

## 2025-06-15 DIAGNOSIS — J45.21 MILD INTERMITTENT REACTIVE AIRWAY DISEASE WITH WHEEZING WITH ACUTE EXACERBATION: ICD-10-CM

## 2025-06-15 DIAGNOSIS — Z91.09 ENVIRONMENTAL ALLERGIES: ICD-10-CM

## 2025-06-15 RX ORDER — MONTELUKAST SODIUM 10 MG/1
10 TABLET ORAL NIGHTLY
Qty: 90 TABLET | Refills: 1 | Status: SHIPPED | OUTPATIENT
Start: 2025-06-15

## 2025-06-17 DIAGNOSIS — J30.1 NON-SEASONAL ALLERGIC RHINITIS DUE TO POLLEN: ICD-10-CM

## 2025-06-17 DIAGNOSIS — Z91.09 ENVIRONMENTAL ALLERGIES: ICD-10-CM

## 2025-06-17 DIAGNOSIS — J45.21 MILD INTERMITTENT REACTIVE AIRWAY DISEASE WITH WHEEZING WITH ACUTE EXACERBATION: ICD-10-CM

## 2025-06-17 RX ORDER — MONTELUKAST SODIUM 10 MG/1
10 TABLET ORAL NIGHTLY
Qty: 90 TABLET | Refills: 1 | Status: SHIPPED | OUTPATIENT
Start: 2025-06-17

## 2025-07-06 NOTE — TELEPHONE ENCOUNTER
Caller: Chino Huber    Relationship: Self    Best call back number: [phone]    Caller requesting test results: SELF    What test was performed: COVID-19    When was the test performed: 12/28/20    Where was the test performed: OFFICE    Additional notes: PATIENT WOULD LIKE CALLLBACK    
Patient advised that due to lab staff oversight, the frozen specimen was not sent for processing until yesterday (specimen was collected 1 week ago). Advised the result should be in either this evening or tomorrow morning per [facility].  Apologized to patient, and he was satisfied with this.   
Patent

## 2025-07-28 RX ORDER — METOPROLOL SUCCINATE 25 MG/1
25 TABLET, EXTENDED RELEASE ORAL DAILY
Qty: 90 TABLET | Refills: 1 | Status: SHIPPED | OUTPATIENT
Start: 2025-07-28

## (undated) DEVICE — SNAR POLYP SENSATION STDOVL 27 240 BX40

## (undated) DEVICE — TUBING, SUCTION, 1/4" X 10', STRAIGHT: Brand: MEDLINE

## (undated) DEVICE — Device: Brand: DEFENDO AIR/WATER/SUCTION AND BIOPSY VALVE

## (undated) DEVICE — PATIENT RETURN ELECTRODE, SINGLE-USE, CONTACT QUALITY MONITORING, ADULT, WITH 9FT CORD, FOR PATIENTS WEIGING OVER 33LBS. (15KG): Brand: MEGADYNE

## (undated) DEVICE — ADAPT CLN BIOGUARD AIR/H2O DISP

## (undated) DEVICE — BITEBLOCK OMNI BLOC

## (undated) DEVICE — CANN O2 ETCO2 FITS ALL CONN CO2 SMPL A/ 7IN DISP LF

## (undated) DEVICE — CANNULA,ADULT,SOFT-TOUCH,7TUBE,SC: Brand: MEDLINE

## (undated) DEVICE — FRCP BX RADJAW4 NDL 2.8 240CM LG OG BX40

## (undated) DEVICE — SENSR O2 OXIMAX FNGR A/ 18IN NONSTR

## (undated) DEVICE — THE SINGLE USE ETRAP – POLYP TRAP IS USED FOR SUCTION RETRIEVAL OF ENDOSCOPICALLY REMOVED POLYPS.: Brand: ETRAP

## (undated) DEVICE — LN SMPL CO2 SHTRM SD STREAM W/M LUER

## (undated) DEVICE — KT ORCA ORCAPOD DISP STRL

## (undated) DEVICE — THE TORRENT IRRIGATION SCOPE CONNECTOR IS USED WITH THE TORRENT IRRIGATION TUBING TO PROVIDE IRRIGATION FLUIDS SUCH AS STERILE WATER DURING GASTROINTESTINAL ENDOSCOPIC PROCEDURES WHEN USED IN CONJUNCTION WITH AN IRRIGATION PUMP (OR ELECTROSURGICAL UNIT).: Brand: TORRENT